# Patient Record
Sex: MALE | Race: WHITE
[De-identification: names, ages, dates, MRNs, and addresses within clinical notes are randomized per-mention and may not be internally consistent; named-entity substitution may affect disease eponyms.]

---

## 2017-09-29 ENCOUNTER — HOSPITAL ENCOUNTER (INPATIENT)
Dept: HOSPITAL 17 - NEPC | Age: 67
LOS: 3 days | Discharge: HOME | DRG: 83 | End: 2017-10-02
Attending: SURGERY | Admitting: SURGERY
Payer: COMMERCIAL

## 2017-09-29 VITALS
DIASTOLIC BLOOD PRESSURE: 91 MMHG | HEART RATE: 71 BPM | OXYGEN SATURATION: 97 % | RESPIRATION RATE: 18 BRPM | SYSTOLIC BLOOD PRESSURE: 135 MMHG

## 2017-09-29 VITALS
TEMPERATURE: 98.1 F | DIASTOLIC BLOOD PRESSURE: 96 MMHG | RESPIRATION RATE: 18 BRPM | HEART RATE: 72 BPM | OXYGEN SATURATION: 98 % | SYSTOLIC BLOOD PRESSURE: 144 MMHG

## 2017-09-29 VITALS
HEART RATE: 87 BPM | DIASTOLIC BLOOD PRESSURE: 69 MMHG | RESPIRATION RATE: 18 BRPM | SYSTOLIC BLOOD PRESSURE: 98 MMHG | OXYGEN SATURATION: 97 %

## 2017-09-29 VITALS — HEIGHT: 71 IN | WEIGHT: 205.03 LBS | BODY MASS INDEX: 28.7 KG/M2

## 2017-09-29 VITALS — OXYGEN SATURATION: 97 %

## 2017-09-29 VITALS — OXYGEN SATURATION: 99 %

## 2017-09-29 DIAGNOSIS — I95.9: ICD-10-CM

## 2017-09-29 DIAGNOSIS — S01.01XA: ICD-10-CM

## 2017-09-29 DIAGNOSIS — S32.029A: ICD-10-CM

## 2017-09-29 DIAGNOSIS — S06.2X9A: ICD-10-CM

## 2017-09-29 DIAGNOSIS — I10: ICD-10-CM
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
## 2017-09-29 DIAGNOSIS — E78.5: ICD-10-CM

## 2017-09-29 DIAGNOSIS — K21.9: ICD-10-CM

## 2017-09-29 DIAGNOSIS — R40.2413: ICD-10-CM

## 2017-09-29 DIAGNOSIS — S06.369A: Primary | ICD-10-CM

## 2017-09-29 DIAGNOSIS — S32.039A: ICD-10-CM

## 2017-09-29 LAB
ANION GAP SERPL CALC-SCNC: 6 MEQ/L (ref 5–15)
APTT BLD: 24.7 SEC (ref 24.3–30.1)
BASOPHILS # BLD AUTO: 0 TH/MM3 (ref 0–0.2)
BASOPHILS NFR BLD: 0.2 % (ref 0–2)
BUN SERPL-MCNC: 14 MG/DL (ref 7–18)
CHLORIDE SERPL-SCNC: 105 MEQ/L (ref 98–107)
EOSINOPHIL # BLD: 0.3 TH/MM3 (ref 0–0.4)
EOSINOPHIL NFR BLD: 2.6 % (ref 0–4)
ERYTHROCYTE [DISTWIDTH] IN BLOOD BY AUTOMATED COUNT: 14.6 % (ref 11.6–17.2)
ETHANOL SERPL-MCNC: (no result) MG/DL (ref 0–5)
GFR SERPLBLD BASED ON 1.73 SQ M-ARVRAT: 59 ML/MIN (ref 89–?)
HCO3 BLD-SCNC: 27.1 MEQ/L (ref 21–32)
HCT VFR BLD CALC: 43.1 % (ref 39–51)
HEMO FLAGS: (no result)
INR PPP: 0.9 RATIO
LYMPHOCYTES # BLD AUTO: 2.4 TH/MM3 (ref 1–4.8)
LYMPHOCYTES NFR BLD AUTO: 21.3 % (ref 9–44)
MAGNESIUM SERPL-MCNC: 2.1 MG/DL (ref 1.5–2.5)
MCH RBC QN AUTO: 29.6 PG (ref 27–34)
MCHC RBC AUTO-ENTMCNC: 33.5 % (ref 32–36)
MCV RBC AUTO: 88.5 FL (ref 80–100)
MONOCYTES NFR BLD: 5.1 % (ref 0–8)
NEUTROPHILS # BLD AUTO: 8 TH/MM3 (ref 1.8–7.7)
NEUTROPHILS NFR BLD AUTO: 70.8 % (ref 16–70)
PLATELET # BLD: 203 TH/MM3 (ref 150–450)
POTASSIUM SERPL-SCNC: 3.5 MEQ/L (ref 3.5–5.1)
PROTHROMBIN TIME: 10.2 SEC (ref 9.8–11.6)
RBC # BLD AUTO: 4.87 MIL/MM3 (ref 4.5–5.9)
SODIUM SERPL-SCNC: 138 MEQ/L (ref 136–145)
WBC # BLD AUTO: 11.3 TH/MM3 (ref 4–11)

## 2017-09-29 PROCEDURE — 86900 BLOOD TYPING SEROLOGIC ABO: CPT

## 2017-09-29 PROCEDURE — 93005 ELECTROCARDIOGRAM TRACING: CPT

## 2017-09-29 PROCEDURE — 85730 THROMBOPLASTIN TIME PARTIAL: CPT

## 2017-09-29 PROCEDURE — 87641 MR-STAPH DNA AMP PROBE: CPT

## 2017-09-29 PROCEDURE — 70551 MRI BRAIN STEM W/O DYE: CPT

## 2017-09-29 PROCEDURE — 70450 CT HEAD/BRAIN W/O DYE: CPT

## 2017-09-29 PROCEDURE — 85610 PROTHROMBIN TIME: CPT

## 2017-09-29 PROCEDURE — 96374 THER/PROPH/DIAG INJ IV PUSH: CPT

## 2017-09-29 PROCEDURE — 72128 CT CHEST SPINE W/O DYE: CPT

## 2017-09-29 PROCEDURE — 85025 COMPLETE CBC W/AUTO DIFF WBC: CPT

## 2017-09-29 PROCEDURE — 94150 VITAL CAPACITY TEST: CPT

## 2017-09-29 PROCEDURE — 83735 ASSAY OF MAGNESIUM: CPT

## 2017-09-29 PROCEDURE — 86901 BLOOD TYPING SEROLOGIC RH(D): CPT

## 2017-09-29 PROCEDURE — 80048 BASIC METABOLIC PNL TOTAL CA: CPT

## 2017-09-29 PROCEDURE — 0HQ0XZZ REPAIR SCALP SKIN, EXTERNAL APPROACH: ICD-10-PCS | Performed by: PHYSICIAN ASSISTANT

## 2017-09-29 PROCEDURE — 80053 COMPREHEN METABOLIC PANEL: CPT

## 2017-09-29 PROCEDURE — 74177 CT ABD & PELVIS W/CONTRAST: CPT

## 2017-09-29 PROCEDURE — 80307 DRUG TEST PRSMV CHEM ANLYZR: CPT

## 2017-09-29 PROCEDURE — 72125 CT NECK SPINE W/O DYE: CPT

## 2017-09-29 PROCEDURE — 86850 RBC ANTIBODY SCREEN: CPT

## 2017-09-29 PROCEDURE — 81001 URINALYSIS AUTO W/SCOPE: CPT

## 2017-09-29 PROCEDURE — 71260 CT THORAX DX C+: CPT

## 2017-09-29 PROCEDURE — 90714 TD VACC NO PRESV 7 YRS+ IM: CPT

## 2017-09-29 PROCEDURE — 72131 CT LUMBAR SPINE W/O DYE: CPT

## 2017-09-29 NOTE — RADRPT
EXAM DATE/TIME:  09/29/2017 21:56 

 

[facility] COMPARISON:     

No previous studies available for comparison.

 

 

INDICATIONS :     

Trauma, fall. Laceration to posterior head.

                      

 

RADIATION DOSE:     

69.15 CTDIvol (mGy) 

 

 

 

MEDICAL HISTORY :     

Non-responsive.  

 

SURGICAL HISTORY :      

Non-responsive. 

 

ENCOUNTER:      

Initial

 

ACUITY:      

1 day

 

PAIN SCALE:      

Non-responsive

 

LOCATION:        

cranial 

 

TECHNIQUE:     

Multiple contiguous axial images were obtained of the head.  Using automated exposure control and adj
ustment of the mA and/or kV according to patient size, radiation dose was kept as low as reasonably a
chievable to obtain optimal diagnostic quality images.   DICOM format image data is available electro
nically for review and comparison.  

 

FINDINGS:     

Parenchymal contusion with adjacent small subarachnoid blood seen of the left parietal and frontal ve
rtices, series 2 image 31. There is a suspected faint parenchymal contusion in the white matter of th
e right frontal lobe measuring about 13 mm in size. Small amount of right frontal extra-axial blood i
s seen on series 2 image 23, probably subarachnoid. There is an 8 mm acute parenchymal hemorrhage of 
the right side of the cerebellum, series 2 image 10. In general, the cerebellum appears somewhat billy
atous.

 

     No mass effect or midline shift. No mass lesion demonstrated. No evidence of an acute ischemic e
vent.

 

CONCLUSION:     

1. Multifocal small cerebral and cerebellar parenchymal hemorrhage.

2. Scattered small supratentorial subarachnoid blood.

3. No mass, mass effect, midline shift or acute infarct..

 

 

 

 Jose Enrique Chiu MD on September 29, 2017 at 22:11           

Board Certified Radiologist.

 This report was verified electronically.

## 2017-09-29 NOTE — RADRPT
EXAM DATE/TIME:  09/29/2017 21:55 

 

[facility] COMPARISON:     

No previous studies available for comparison.

 

 

INDICATIONS :     

Trauma, fall.

                      

 

RADIATION DOSE:     

49.4 CTDIvol (mGy) 

 

 

 

MEDICAL HISTORY :     

Non-responsive.  

 

SURGICAL HISTORY :      

Non-responsive. 

 

ENCOUNTER:      

Initial

 

ACUITY:      

1 day

 

PAIN SCALE:      

Non-responsive

 

LOCATION:        

neck 

 

TECHNIQUE:     

Volumetric scanning of the cervical spine was performed. Multiplanar reconstructions in the sagittal,
 coronal and oblique axial planes were performed.   Using automated exposure control and adjustment o
f the mA and/or kV according to patient size, radiation dose was kept as low as reasonably achievable
 to obtain optimal diagnostic quality images.   DICOM format image data is available electronically f
or review and comparison.  

 

FINDINGS:     

 

VERTEBRAE:     

Normal vertebral body height.

 

ALIGNMENT:     

No evidence of subluxation.

 

C2-C3:  

The bony spinal canal is normal in size.  No evidence of disc bulge or herniation.  The neural forami
na are bilaterally patent.

 

C3-C4:  

The bony spinal canal is normal in size.  No evidence of disc bulge or herniation.  The neural forami
na are bilaterally patent.

 

C4-C5:  

The bony spinal canal is normal in size.  No evidence of disc bulge or herniation.  The neural forami
na are bilaterally patent.

 

C5-C6: 

Moderate disc space narrowing with uncovertebral and facet osteoarthritis.

 

C6-C7: 

Severe disc space narrowing with uncovertebral and facet osteoarthritis and mild right, moderate left
 foraminal stenosis.

 

C7-T1:  

The bony spinal canal is normal in size.  No evidence of disc bulge or herniation.  The neural forami
na are bilaterally patent.

 

CONCLUSION:     

Intact cervical spine. Degenerative changes at C5/C6 and C6/C7.

 

 

 

 Jose Enrique Chiu MD on September 29, 2017 at 22:16           

Board Certified Radiologist.

 This report was verified electronically.

## 2017-09-29 NOTE — PD
HPI


Chief Complaint:  Fall


Time Seen by Provider:  21:45


Travel History


International Travel<30 days:  No


Contact w/Intl Traveler<30days:  No


Traveled to known affect area:  No





History of Present Illness


HPI


67-year-old male that presents to the ED via ambulance for evaluation of head 

injury.  Patient apparently had a head injury about less than half an hour ago.

  Patient apparently was seen at chair with wheels and tried to cath a lizard 

on the chair when he fell and hit the base on the back of his head.  He didn't 

lose consciousness but to the family about 10 minutes to get any information 

from the patient.  Ambulance was called and brought him here.  Per ambulance she

's been repetitive in his questioning and he does not appear to be answering 

questions appropriately.  He does not appear to know what's going on.  He does 

not take blood thinners.  He does have a significant cut to the back of the 

head per ambulance.  Per ambulance he continues to bleed.  No chest pain or 

shortness of breath.  No other complaints but patient again is a poor historian 

secondary to his medical condition at this time.  Patient appears to be moaning 

and complains of pain but cannot really give me a number.  He appears to be 

moving all extremities in no obvious distress.  Patient was brought in a 

cervical collar and backboard noted.





[facility]


Social History


Alcohol Use:  Yes


Tobacco Use:  No


Substance Use:  No





Allergies-Medications


(Allergen,Severity, Reaction):  


Coded Allergies:  


     No Known Allergies (Unverified , 9/29/17)





Review of Systems


Except as stated in HPI:  all other systems reviewed are Neg





Physical Exam


Narrative


GENERAL: 


SKIN: Warm and dry.  Patient appears to have laceration to the back of the head 

by hard to see secondary to cervical collar on initial evaluation.


HEAD: Atraumatic. Normocephalic. 


EYES: Pupils equal and round 4 mm reactive to light and accommodation. No 

scleral icterus. No injection or drainage. 


ENT: No nasal bleeding or discharge.  Mucous membranes pink and moist.  Tongue 

is midline.  No uvula deviation.


NECK: Trachea midline. No JVD. 


CARDIOVASCULAR: Regular rate and rhythm.  No murmurs, S3, S4.


RESPIRATORY: No accessory muscle use. Clear to auscultation. Breath sounds 

equal bilaterally. 


GASTROINTESTINAL: Abdomen soft, non-tender, nondistended. Hepatic and splenic 

margins not palpable. 


MUSCULOSKELETAL: Extremities without clubbing, cyanosis, or edema. No obvious 

deformities.  Full range of motion of the upper and lower extremities 

bilaterally.  Pupils pulses bilaterally.  No obvious lumbar or thoracic spine 

tenderness to palpation.  Patient had cervical collar and backboard noted.


NEUROLOGICAL: Awake and alert. No obvious cranial nerve deficits.  Motor 

grossly within normal limits. Five out of 5 muscle strength in the arms and 

legs.  Normal speech.


PSYCHIATRIC: Appropriate mood and affect; insight and judgment normal.





Data


Data


Last Documented VS





Vital Signs








  Date Time  Temp Pulse Resp B/P (MAP) Pulse Ox O2 Delivery O2 Flow Rate FiO2


 


9/29/17 22:27  71 18 135/91 (106) 97 Room Air  


 


9/29/17 21:48 98.1       








Orders





 Orders


Electrocardiogram (9/29/17 21:45)


Complete Blood Count With Diff (9/29/17 21:45)


Basic Metabolic Panel (Bmp) (9/29/17 21:45)


Prothrombin Time / Inr (Pt) (9/29/17 21:45)


Act Partial Throm Time (Ptt) (9/29/17 21:45)


Urinalysis - C+S If Indicated (9/29/17 21:45)


Magnesium (Mg) (9/29/17 21:45)


Chest, Single Ap (9/29/17 21:45)


Ct Brain W/O Iv Contrast(Rout) (9/29/17 21:45)


Iv Access Insert/Monitor (9/29/17 21:45)


Ecg Monitoring (9/29/17 21:45)


Oximetry (9/29/17 21:45)


Type And Screen (9/29/17 21:45)


Ct Cerv Spine W/O Contrast (9/29/17 )


Spine, Lumbar Comp W/Obliq (9/29/17 )


Pelvis, Ap Only (Routine) (9/29/17 )


Wound Care (9/29/17 21:45)


Tetanus/Diphtheria Tox Adult (Tetanus/Di (9/29/17 21:45)


Lidocai-Epi 1%-1:100,000 Inj (Xylocaine- (9/29/17 21:45)


Ondansetron Inj (Zofran Inj) (9/29/17 22:00)


Ondansetron Inj (Zofran Inj) (9/29/17 21:58)


Alcohol (Ethanol) (9/29/17 22:20)


Levetiracetam 1000 Mg Inj (Keppra 1000 M (9/29/17 23:00)


Admit Order (Ed Use Only) (9/29/17 22:54)


Consult Neurosurgery (9/29/17 )





Labs





Laboratory Tests








Test


  9/29/17


22:20


 


White Blood Count 11.3 TH/MM3 


 


Red Blood Count 4.87 MIL/MM3 


 


Hemoglobin 14.4 GM/DL 


 


Hematocrit 43.1 % 


 


Mean Corpuscular Volume 88.5 FL 


 


Mean Corpuscular Hemoglobin 29.6 PG 


 


Mean Corpuscular Hemoglobin


Concent 33.5 % 


 


 


Red Cell Distribution Width 14.6 % 


 


Platelet Count 203 TH/MM3 


 


Mean Platelet Volume 8.2 FL 


 


Neutrophils (%) (Auto) 70.8 % 


 


Lymphocytes (%) (Auto) 21.3 % 


 


Monocytes (%) (Auto) 5.1 % 


 


Eosinophils (%) (Auto) 2.6 % 


 


Basophils (%) (Auto) 0.2 % 


 


Neutrophils # (Auto) 8.0 TH/MM3 


 


Lymphocytes # (Auto) 2.4 TH/MM3 


 


Monocytes # (Auto) 0.6 TH/MM3 


 


Eosinophils # (Auto) 0.3 TH/MM3 


 


Basophils # (Auto) 0.0 TH/MM3 


 


CBC Comment DIFF FINAL 


 


Differential Comment  











MDM


Medical Decision Making


Medical Screen Exam Complete:  Yes


Emergency Medical Condition:  Yes


Medical Record Reviewed:  Yes


Interpretation(s)


CBC Diagram


9/29/17 22:20








CT shows subarachnoid hemmorrhage


Ct cervical spine shows no acute injury


Differential Diagnosis


Traumatic head injury versus ICH versus concussion versus head bleed versus 

laceration versus coagulopathy versus cervical fracture


Narrative Course


67-year-old male that presents to the ED for evaluation of head injury.  

Patient was evaluated and was a very poor historian.  Patient does appear to 

have suffered a significant head injury.  Patient did vomit on the way to CT 

scan.  CT scan was ordered.  Labs were ordered.  Case was discussed in my 

attending Dr. Burgess who evaluated the patient with me.  Labs and imaging 

showed head bleed.  Case was discussed with my attending for agrees with plan.  

Cervical spine was negative, cervical collar removed. CT head shows head bleed. 

My attending Dr burgess recommends neurosurgery and trauma surgeon eval. Dr Overton agrees to admit to trauma/intensivist, keppra 1000 mg BID, CT 

repeat in 12 hrs. Consult to him. Case consulted with Dr. Lopez for trauma 

surgeon who agrees to admission.  He will come here and see the patient.  

Patient will be admitted to the intensive care.  He will come and evaluate the 

patient.





Procedures


**Procedure Narrative**


LACERATION


LOCATION: left occipital head


LENGTH: 5 cm


NUMBER OF STITCHES/STAPLES: 10 staples





REPAIR: The area of the laceration was prepped with Betadine and sterilely 

draped.  The laceration was infiltrated with 1% Xylocaine.  The wound was 

copiously irrigated and explored without evidence of foreign body, tendon 

injury or neurovascular injury.  The wound was closed using sterile stapler. 

This was a 1 layer repair. A sterile dressing was applied. The patient was 

advised to keep the dressing clean and dry. Patient tolerated the procedure 

well.





Diagnosis





 Primary Impression:  


 Subarachnoid hemorrhage following injury


 Qualified Codes:  S06.6X1A - Traumatic subarachnoid hemorrhage with loss of 

consciousness of 30 minutes or less, initial encounter


 Additional Impression:  


 Head trauma


 Qualified Codes:  S09.90XA - Unspecified injury of head, initial encounter





Admitting Information


Admitting Physician Requests:  Admit











Ryan Morse Sep 29, 2017 22:04

## 2017-09-30 VITALS
RESPIRATION RATE: 11 BRPM | TEMPERATURE: 98.6 F | DIASTOLIC BLOOD PRESSURE: 58 MMHG | HEART RATE: 72 BPM | OXYGEN SATURATION: 98 % | SYSTOLIC BLOOD PRESSURE: 99 MMHG

## 2017-09-30 VITALS
OXYGEN SATURATION: 94 % | DIASTOLIC BLOOD PRESSURE: 73 MMHG | RESPIRATION RATE: 19 BRPM | TEMPERATURE: 98.1 F | HEART RATE: 88 BPM | SYSTOLIC BLOOD PRESSURE: 125 MMHG

## 2017-09-30 VITALS — HEART RATE: 79 BPM

## 2017-09-30 VITALS
SYSTOLIC BLOOD PRESSURE: 95 MMHG | OXYGEN SATURATION: 100 % | RESPIRATION RATE: 18 BRPM | HEART RATE: 78 BPM | DIASTOLIC BLOOD PRESSURE: 64 MMHG

## 2017-09-30 VITALS
DIASTOLIC BLOOD PRESSURE: 59 MMHG | RESPIRATION RATE: 15 BRPM | HEART RATE: 72 BPM | TEMPERATURE: 99.4 F | SYSTOLIC BLOOD PRESSURE: 116 MMHG | OXYGEN SATURATION: 97 %

## 2017-09-30 VITALS
TEMPERATURE: 99.3 F | OXYGEN SATURATION: 95 % | HEART RATE: 82 BPM | SYSTOLIC BLOOD PRESSURE: 136 MMHG | RESPIRATION RATE: 17 BRPM | DIASTOLIC BLOOD PRESSURE: 71 MMHG

## 2017-09-30 VITALS
SYSTOLIC BLOOD PRESSURE: 81 MMHG | RESPIRATION RATE: 13 BRPM | TEMPERATURE: 98.3 F | OXYGEN SATURATION: 97 % | HEART RATE: 76 BPM | DIASTOLIC BLOOD PRESSURE: 52 MMHG

## 2017-09-30 VITALS
RESPIRATION RATE: 12 BRPM | TEMPERATURE: 98.1 F | SYSTOLIC BLOOD PRESSURE: 118 MMHG | OXYGEN SATURATION: 98 % | DIASTOLIC BLOOD PRESSURE: 63 MMHG | HEART RATE: 68 BPM

## 2017-09-30 VITALS
HEART RATE: 79 BPM | RESPIRATION RATE: 18 BRPM | OXYGEN SATURATION: 99 % | SYSTOLIC BLOOD PRESSURE: 124 MMHG | DIASTOLIC BLOOD PRESSURE: 71 MMHG

## 2017-09-30 VITALS — HEART RATE: 68 BPM

## 2017-09-30 VITALS — HEART RATE: 72 BPM

## 2017-09-30 VITALS — HEART RATE: 88 BPM

## 2017-09-30 VITALS — OXYGEN SATURATION: 98 %

## 2017-09-30 VITALS — OXYGEN SATURATION: 99 %

## 2017-09-30 VITALS — HEART RATE: 85 BPM

## 2017-09-30 VITALS — HEART RATE: 90 BPM

## 2017-09-30 LAB
ANION GAP SERPL CALC-SCNC: 7 MEQ/L (ref 5–15)
BASOPHILS # BLD AUTO: 0 TH/MM3 (ref 0–0.2)
BASOPHILS # BLD AUTO: 0 TH/MM3 (ref 0–0.2)
BASOPHILS NFR BLD: 0.2 % (ref 0–2)
BASOPHILS NFR BLD: 0.3 % (ref 0–2)
BUN SERPL-MCNC: 13 MG/DL (ref 7–18)
CHLORIDE SERPL-SCNC: 109 MEQ/L (ref 98–107)
COLOR UR: YELLOW
COMMENT (UR): (no result)
CULTURE IF INDICATED: (no result)
EOSINOPHIL # BLD: 0 TH/MM3 (ref 0–0.4)
EOSINOPHIL # BLD: 0 TH/MM3 (ref 0–0.4)
EOSINOPHIL NFR BLD: 0.1 % (ref 0–4)
EOSINOPHIL NFR BLD: 0.3 % (ref 0–4)
ERYTHROCYTE [DISTWIDTH] IN BLOOD BY AUTOMATED COUNT: 14.1 % (ref 11.6–17.2)
ERYTHROCYTE [DISTWIDTH] IN BLOOD BY AUTOMATED COUNT: 14.8 % (ref 11.6–17.2)
GFR SERPLBLD BASED ON 1.73 SQ M-ARVRAT: 82 ML/MIN (ref 89–?)
GLUCOSE UR STRIP-MCNC: (no result) MG/DL
HCO3 BLD-SCNC: 24.5 MEQ/L (ref 21–32)
HCT VFR BLD CALC: 34.2 % (ref 39–51)
HCT VFR BLD CALC: 37.6 % (ref 39–51)
HEMO FLAGS: (no result)
HEMO FLAGS: (no result)
HGB UR QL STRIP: (no result)
HYALINE CASTS #/AREA URNS LPF: 3 /LPF
INR PPP: 1 RATIO
KETONES UR STRIP-MCNC: 10 MG/DL
LYMPHOCYTES # BLD AUTO: 1.2 TH/MM3 (ref 1–4.8)
LYMPHOCYTES # BLD AUTO: 1.8 TH/MM3 (ref 1–4.8)
LYMPHOCYTES NFR BLD AUTO: 9 % (ref 9–44)
LYMPHOCYTES NFR BLD AUTO: 9.6 % (ref 9–44)
MCH RBC QN AUTO: 29.1 PG (ref 27–34)
MCH RBC QN AUTO: 29.5 PG (ref 27–34)
MCHC RBC AUTO-ENTMCNC: 32.9 % (ref 32–36)
MCHC RBC AUTO-ENTMCNC: 33 % (ref 32–36)
MCV RBC AUTO: 88.3 FL (ref 80–100)
MCV RBC AUTO: 89.6 FL (ref 80–100)
MONOCYTES NFR BLD: 5.3 % (ref 0–8)
MONOCYTES NFR BLD: 6.3 % (ref 0–8)
MUCOUS THREADS #/AREA URNS LPF: (no result) /LPF
NEUTROPHILS # BLD AUTO: 11.7 TH/MM3 (ref 1.8–7.7)
NEUTROPHILS # BLD AUTO: 15.5 TH/MM3 (ref 1.8–7.7)
NEUTROPHILS NFR BLD AUTO: 83.6 % (ref 16–70)
NEUTROPHILS NFR BLD AUTO: 85.3 % (ref 16–70)
NITRITE UR QL STRIP: (no result)
PLATELET # BLD: 169 TH/MM3 (ref 150–450)
PLATELET # BLD: 185 TH/MM3 (ref 150–450)
POTASSIUM SERPL-SCNC: 4.2 MEQ/L (ref 3.5–5.1)
PROTHROMBIN TIME: 11.2 SEC (ref 9.8–11.6)
RBC # BLD AUTO: 3.87 MIL/MM3 (ref 4.5–5.9)
RBC # BLD AUTO: 4.2 MIL/MM3 (ref 4.5–5.9)
SODIUM SERPL-SCNC: 140 MEQ/L (ref 136–145)
SP GR UR STRIP: (no result) (ref 1–1.03)
SQUAMOUS #/AREA URNS HPF: <1 /HPF (ref 0–5)
WBC # BLD AUTO: 13.7 TH/MM3 (ref 4–11)
WBC # BLD AUTO: 18.6 TH/MM3 (ref 4–11)

## 2017-09-30 RX ADMIN — STANDARDIZED SENNA CONCENTRATE AND DOCUSATE SODIUM SCH TAB: 8.6; 5 TABLET, FILM COATED ORAL at 20:17

## 2017-09-30 RX ADMIN — WATER SCH ML: 1 IRRIGANT IRRIGATION at 09:00

## 2017-09-30 RX ADMIN — LEVETIRACETAM SCH MLS/HR: 100 INJECTION, SOLUTION, CONCENTRATE INTRAVENOUS at 08:16

## 2017-09-30 RX ADMIN — FAMOTIDINE SCH MG: 20 TABLET, FILM COATED ORAL at 08:14

## 2017-09-30 RX ADMIN — LEVETIRACETAM SCH MLS/HR: 100 INJECTION, SOLUTION, CONCENTRATE INTRAVENOUS at 01:24

## 2017-09-30 RX ADMIN — MAGNESIUM HYDROXIDE SCH ML: 400 SUSPENSION ORAL at 20:18

## 2017-09-30 RX ADMIN — NICOTINE SCH PATCH: 21 PATCH, EXTENDED RELEASE TOPICAL at 20:13

## 2017-09-30 RX ADMIN — MAGNESIUM HYDROXIDE SCH ML: 400 SUSPENSION ORAL at 09:00

## 2017-09-30 RX ADMIN — Medication SCH ML: at 20:14

## 2017-09-30 RX ADMIN — ACETAMINOPHEN PRN MG: 325 TABLET ORAL at 20:14

## 2017-09-30 RX ADMIN — STANDARDIZED SENNA CONCENTRATE AND DOCUSATE SODIUM SCH TAB: 8.6; 5 TABLET, FILM COATED ORAL at 08:14

## 2017-09-30 RX ADMIN — CHLORHEXIDINE GLUCONATE SCH PACK: 500 CLOTH TOPICAL at 03:15

## 2017-09-30 RX ADMIN — ACETAMINOPHEN PRN MG: 325 TABLET ORAL at 12:47

## 2017-09-30 RX ADMIN — LEVETIRACETAM SCH MLS/HR: 100 INJECTION, SOLUTION, CONCENTRATE INTRAVENOUS at 20:13

## 2017-09-30 RX ADMIN — ACETAMINOPHEN PRN MLS/HR: 10 INJECTION, SOLUTION INTRAVENOUS at 01:15

## 2017-09-30 RX ADMIN — FAMOTIDINE SCH MG: 20 TABLET, FILM COATED ORAL at 20:14

## 2017-09-30 RX ADMIN — Medication SCH ML: at 08:20

## 2017-09-30 NOTE — RADRPT
EXAM DATE/TIME:  09/30/2017 00:37 

 

[facility] COMPARISON:     

No previous studies available for comparison.

 

 

INDICATIONS :     

Trauma, fall.

                      

 

IV CONTRAST:     

100 cc Omnipaque 350 (iohexol) IV ; Cumulative dose for multiple exams.

 

 

RADIATION DOSE:     

5.91 CTDIvol (mGy) ; Combined studies - Thorax/Abdomen/Pelvis

 

 

MEDICAL HISTORY :     

None  

 

SURGICAL HISTORY :      

None. 

 

ENCOUNTER:      

Initial

 

ACUITY:      

1 day

 

PAIN SCALE:      

Non-responsive

 

LOCATION:        

chest 

 

TECHNIQUE:      

Volumetric scanning of the chest was performed.  Using automated exposure control and adjustment of t
he mA and/or kV according to patient size, radiation dose was kept as low as reasonably achievable to
 obtain optimal diagnostic quality images.   DICOM format image data is available electronically for 
review and comparison.  

 

Follow-up recommendations for detected pulmonary nodules are based at a minimum on nodule size and pa
tient risk factors according to Fleischner Society Guidelines.

 

FINDINGS:     

Dependent atelectasis is present in the lungs. No focal lung consolidation. No adenopathy. Calcified 
mediastinal lymph nodes present.

 

Remote bilateral healed rib fractures noted. No acute fractures identified. No pleural or pericardial
 effusion.

 

There is a moderate-sized hiatal hernia. No acute findings in the upper abdomen. Remote granulomatous
 disease in the spleen. Surgical clips in the right upper quadrant.

 

CONCLUSION:     

1. Negative for acute traumatic injury in the thorax. Dependent atelectasis in the lungs. Remote gran
ulomatous disease with calcified mediastinal lymph nodes.

2. Moderate-sized hiatal hernia. Remote healed bilateral rib fractures.

 

 

 

 Jeremie Cummings MD on September 30, 2017 at 1:02           

Board Certified Radiologist.

 This report was verified electronically.

## 2017-09-30 NOTE — HHI.HP
History of Present Illness


Primary Care Physician


Unknown


Admission Diagnosis





acute traumatic subarachnoid hemorrhage, head lac


Diagnoses:  


History of Present Illness


67 y.o male fell from a chair and hit the back of his head,open back of his 

head with bleeding-already seen and worked up by the ED,has SAH,gcs 15,neuro 

intact,scalp wound sutured by ED-episode of hypotension-responded well to 1L 

bolus,stat CT chest,AP ordered-at time of my exam patient c/o headache





Review of Systems


Constitutional:  COMPLAINS OF: Diaphoretic episodes, Fatigue, Fever, Weight gain

, Weight loss, Chills, Dizziness, Change in appetite, Night Sweats


Endocrine:  DENIES: Heat/cold intolerance, Polydipsia, Polyuria, Polyphagia


Eyes:  DENIES: Blurred vision, Diplopia, Eye inflammation, Eye pain, Vision loss

, Photosensitivity, Double Vision


Ears, nose, mouth, throat:  DENIES: Tinnitus, Hearing loss, Vertigo, Nasal 

discharge, Oral lesions, Throat pain, Hoarseness, Ear Pain, Running Nose, 

Epistaxis, Sinus Pain, Toothache, Odynophagia


Respiratory:  DENIES: Apneas, Cough, Snoring, Wheezing, Hemoptysis, Sputum 

production, Shortness of breath


Cardiovascular:  DENIES: Chest pain, Palpitations, Syncope, Dyspnea on Exertion

, PND, Lower Extremity Edema, Orthopnea, Claudication


Gastrointestinal:  DENIES: Abdominal pain, Black stools, Bloody stools, 

Constipation, Diarrhea, Nausea, Vomiting, Difficulty Swallowing, Anorexia


Genitourinary:  DENIES: Sexual dysfunction, Urinary frequency, Urinary 

incontinence, Urgency, Hematuria, Dysuria, Nocturia, Penile Discharge, 

Testicular Pain, Testicular Swelling


Musculoskeletal:  DENIES: Joint pain, Muscle aches, Stiffness, Joint Swelling, 

Back pain, Neck pain


Integumentary:  DENIES: Abnormal pigmentation, Nail changes, Pruritus, Rash


Hematologic/lymphatic:  DENIES: Bruising, Lymphadenopathy


Neurologic:  DENIES: Abnormal gait, Headache, Localized weakness, Paresthesias, 

Seizures, Speech Problems, Tremor, Poor Balance


Psychiatric:  DENIES: Anxiety, Confusion, Mood changes, Depression, 

Hallucinations, Agitation, Suicidal Ideation, Homicidal Ideation, Delusions





Past Family Social History


Allergies:  


Coded Allergies:  


     No Known Allergies (Unverified , 17)


Past Medical History


none


Past Surgical History


multiple fx sx


Reported Medications


none


Active Ordered Medications


none


Family History


none


Social History


no etoh





Physical Exam


Vital Signs





Vital Signs








  Date Time  Temp Pulse Resp B/P (MAP) Pulse Ox O2 Delivery O2 Flow Rate FiO2


 


17 00:31  79 18 124/71 (88) 99 Room Air  


 


17 00:19  78 18 95/64 (74) 100 Room Air  


 


17 23:29  87 18 98/69 (79) 97 Nasal Cannula 2.00 


 


17 23:23     99 Nasal Cannula 2.00 


 


17 22:27  71 18 135/91 (106) 97 Room Air  


 


17 21:52     97 Room Air  


 


17 21:52  74 18  97   


 


17 21:48 98.1 72 18 144/96 (112) 98   








Physical Exam


GENERAL: This is a well-nourished, well-developed patient, in mild apparent 

distress.


SKIN: No rashes, ecchymoses or lesions. Cool and dry.


HEAD: sutured occipital wound


EYES: Pupils equal round and reactive. Extraocular motions intact.


ENT: Nose without bleeding . Airway patent.


NECK: Trachea midline. No JVD or lymphadenopathy. Supple, nontender


CARDIOVASCULAR: Regular rate and rhythm without murmurs, gallops, or rubs. 


RESPIRATORY: Clear to auscultation. Breath sounds equal bilaterally. No wheezes

, rales, or rhonchi.  


GASTROINTESTINAL: Abdomen soft, non-tender, nondistended, . No guarding.


MUSCULOSKELETAL: Extremities without edema. No joint tenderness, effusion, or 

edema noted. 


NEUROLOGICAL: Awake and alert. Cranial nerves II through XII intact.  Motor and 

sensory grossly within normal limits. Five out of 5 muscle strength in all 

muscle groups.  Normal speech.


Laboratory





Laboratory Tests








Test


  17


22:20


 


White Blood Count 11.3 


 


Red Blood Count 4.87 


 


Hemoglobin 14.4 


 


Hematocrit 43.1 


 


Mean Corpuscular Volume 88.5 


 


Mean Corpuscular Hemoglobin 29.6 


 


Mean Corpuscular Hemoglobin


Concent 33.5 


 


 


Red Cell Distribution Width 14.6 


 


Platelet Count 203 


 


Mean Platelet Volume 8.2 


 


Neutrophils (%) (Auto) 70.8 


 


Lymphocytes (%) (Auto) 21.3 


 


Monocytes (%) (Auto) 5.1 


 


Eosinophils (%) (Auto) 2.6 


 


Basophils (%) (Auto) 0.2 


 


Neutrophils # (Auto) 8.0 


 


Lymphocytes # (Auto) 2.4 


 


Monocytes # (Auto) 0.6 


 


Eosinophils # (Auto) 0.3 


 


Basophils # (Auto) 0.0 


 


CBC Comment DIFF FINAL 


 


Differential Comment  


 


Prothrombin Time 10.2 


 


Prothromb Time International


Ratio 0.9 


 


 


Activated Partial


Thromboplast Time 24.7 


 


 


Blood Urea Nitrogen 14 


 


Creatinine 1.23 


 


Random Glucose 138 


 


Calcium Level 8.9 


 


Magnesium Level 2.1 


 


Sodium Level 138 


 


Potassium Level 3.5 


 


Chloride Level 105 


 


Carbon Dioxide Level 27.1 


 


Anion Gap 6 


 


Estimat Glomerular Filtration


Rate 59 


 


 


Ethyl Alcohol Level LESS THAN 3 








Result Diagram:  


17








Caprini VTE Risk Assessment


Caprini VTE Risk Assessment:  Mod/High Risk (score >= 2)


VTE Pharm Contraindication:  Hemorrhage


Caprini Risk Assessment Model











 Point Value = 1          Point Value = 2  Point Value = 3  Point Value = 5


 


Age 41-60


Minor surgery


BMI > 25 kg/m2


Swollen legs


Varicose veins


Pregnancy or postpartum


History of unexplained or recurrent


   spontaneous 


Oral contraceptives or hormone


   replacement


Sepsis (< 1 month)


Serious lung disease, including


   pneumonia (< 1 month)


Abnormal pulmonary function


Acute myocardial infarction


Congestive heart failure (< 1 month)


History of inflammatory bowel disease


Medical patient at bed rest Age 61-74


Arthroscopic surgery


Major open surgery (> 45 min)


Laparoscopic surgery (> 45 min)


Malignancy


Confined to bed (> 72 hours)


Immobilizing plaster cast


Central venous access Age >= 75


History of VTE


Family history of VTE


Factor V Leiden


Prothrombin 54443Y


Lupus anticoagulant


Anticardiolipin antibodies


Elevated serum homocysteine


Heparin-induced thrombocytopenia


Other congenital or acquired


   thrombophilia Stroke (< 1 month)


Elective arthroplasty


Hip, pelvis, or leg fracture


Acute spinal cord injury (< 1 month)








Prophylaxis Regimen











   Total Risk


Factor Score Risk Level Prophylaxis Regimen


 


0-1      Low Early ambulation


 


2 Moderate Order ONE of the following:


*Sequential Compression Device (SCD)


*Heparin 5000 units SQ BID


 


3-4 Higher Order ONE of the following medications:


*Heparin 5000 units SQ TID


*Enoxaparin/Lovenox 40 mg SQ daily (WT < 150 kg, CrCl > 30 mL/min)


*Enoxaparin/Lovenox 30 mg SQ daily (WT < 150 kg, CrCl > 10-29 mL/min)


*Enoxaparin/Lovenox 30 mg SQ BID (WT < 150 kg, CrCl > 30 mL/min)


AND/OR


*Sequential Compression Device (SCD)


 


5 or more Highest Order ONE of the following medications:


*Heparin 5000 units SQ TID (Preferred with Epidurals)


*Enoxaparin/Lovenox 40 mg SQ daily (WT < 150 kg, CrCl > 30 mL/min)


*Enoxaparin/Lovenox 30 mg SQ daily (WT < 150 kg, CrCl > 10-29 mL/min)


*Enoxaparin/Lovenox 30 mg SQ BID (WT < 150 kg, CrCl > 30 mL/min)


AND


*Sequential Compression Device (SCD)











Assessment and Plan


Assessment and Plan


SAH


Open scalp wound


L2-3 TP fx


no traumatic injury abdomen/chest





admit to [facility]


neuro checks


repeat CT in am


[facility]


pain control











Ananya Lopez MD Sep 30, 2017 00:44

## 2017-09-30 NOTE — EKG
Date Performed: 09/29/2017       Time Performed: 22:15:56

 

PTAGE:      67 years

 

EKG:      Sinus rhythm 

 

 NORMAL ECG 

 

NO PREVIOUS TRACING            

 

DOCTOR:   Maik Wang  Interpretating Date/Time  09/30/2017 08:27:45

## 2017-09-30 NOTE — RADRPT
EXAM DATE/TIME:  09/30/2017 00:37 

 

[facility] COMPARISON:     

No previous studies available for comparison.

 

 

INDICATIONS :     

Trauma, fall.

                      

 

IV CONTRAST:     

100 cc Omnipaque 350 (iohexol) IV ; Cumulative dose for multiple exams.

 

 

ORAL CONTRAST:      

No oral contrast ingested.

                      

 

RADIATION DOSE:     

5.91 CTDIvol (mGy) ; Combined studies - Thorax/Abdomen/Pelvis

 

 

MEDICAL HISTORY :     

None  

 

SURGICAL HISTORY :      

None. 

 

ENCOUNTER:      

Initial

 

ACUITY:      

1 day

 

PAIN SCALE:      

Non-responsive

 

LOCATION:         

abdomen

 

TECHNIQUE:     

Volumetric scanning of the abdomen and pelvis was performed.  Using automated exposure control and ad
justment of the mA and/or kV according to patient size, radiation dose was kept as low as reasonably 
achievable to obtain optimal diagnostic quality images.  DICOM format image data is available electro
nically for review and comparison.  

 

FINDINGS:     

Mild fatty liver. Calcified granulomata in the liver and spleen. Previous cholecystectomy. Adrenals, 
kidneys and pancreas demonstrate no acute findings.

 

No free fluid. No bowel obstruction. No adenopathy. No free air.

 

Examination of bone windows reveals a mildly displaced left-sided transverse process fracture at L2, 
L3.

 

CONCLUSION:     

1. Left-sided transverse process fractures at L2 and L3.

2. Negative for acute traumatic injury within the abdomen and pelvis.

 

 

 

 Jeremie Cummings MD on September 30, 2017 at 1:07           

Board Certified Radiologist.

 This report was verified electronically.

## 2017-09-30 NOTE — RADRPT
EXAM DATE/TIME:  09/30/2017 00:37 

 

[facility] COMPARISON:     

No previous studies available for comparison.

 

 

INDICATIONS :     

Trauma, fall.

                      

 

RADIATION DOSE:      

CTDIvol (mGy) ; Reconstructed from previous dataset, no dose

 

 

 

MEDICAL HISTORY :     

None  

 

SURGICAL HISTORY :      

None. 

 

ENCOUNTER:      

Initial

 

ACUITY:      

1 day

 

PAIN SCALE:      

Non-responsive

 

LOCATION:        

Paraspinal 

 

TECHNIQUE:     

Volumetric scanning of the thoracic spine was performed.  Multiplanar reconstructions in the sagittal
, coronal and oblique axial planes were performed.  Using automated exposure control and adjustment o
f the mA and/or kV according to patient size, radiation dose was kept as low as reasonably achievable
 to obtain optimal diagnostic quality images.   DICOM format image data is available electronically f
or review and comparison.  

 

FINDINGS:     

The vertebral bodies of the thoracic spine are in normal alignment without evidence of subluxation.  


Vertebral body height is maintained.  No fractures are seen.

 

T1-T2:   

Normal.

 

T2-T3:   

The thecal sac has a normal diameter.  No evidence of disc bulge or protrusion.

 

T3-T4:   

The thecal sac has a normal diameter.  No evidence of disc bulge or protrusion.

 

T4-T5:   

The thecal sac has a normal diameter.  No evidence of disc bulge or protrusion.

 

T5-T6:   

The thecal sac has a normal diameter.  No evidence of disc bulge or protrusion.

 

T6-T7:   

The thecal sac has a normal diameter.  No evidence of disc bulge or protrusion.

 

T7-T8:   

The thecal sac has a normal diameter.  No evidence of disc bulge or protrusion.

 

T8-T9:   

The thecal sac has a normal diameter.  No evidence of disc bulge or protrusion.

 

T9-T10:  

The thecal sac has a normal diameter.  No evidence of disc bulge or protrusion.

 

T10-T11:  

The thecal sac has a normal diameter.  No evidence of disc bulge or protrusion.

 

T11-T12:  

The thecal sac has a normal diameter.  No evidence of disc bulge or protrusion.

 

T12-L1:  

The thecal sac has a normal diameter.  No evidence of disc bulge or protrusion.

 

CONCLUSION:     

1. Negative for acute traumatic injury in the thoracic spine.

 

 

 

 Jeremie Cummings MD on September 30, 2017 at 1:27           

Board Certified Radiologist.

 This report was verified electronically.

## 2017-09-30 NOTE — PD
HPI


Chief Complaint:  Fall


Time Seen by Provider:  21:45


Travel History


International Travel<30 days:  No


Contact w/Intl Traveler<30days:  No


Traveled to known affect area:  No





History of Present Illness


HPI


This is a 67-year-old male who presents to the emergency department having been 

sitting on a rolling chair when he was trying to get a lizard out of his room 

and he fell backwards hitting his head on a moderate glass vase.  Patient did 

have loss of consciousness for about 10 minutes and was very confused following 

the incident.  He had a lot of blood coming from the back of his head.  Patient 

doesn't provide much history but is able to say his name.





PFSH


Past Medical History


Asthma:  Yes


Cancer:  No


Cardiovascular Problems:  Yes


Endocrine:  No


Genitourinary:  No


Hiatal Hernia:  Yes


Immune Disorder:  No


Musculoskeletal:  No


Neurologic:  No


Psychiatric:  No


Reproductive:  No





Past Surgical History


Abdominal Surgery:  No


Cardiac Surgery:  No


Ear Surgery:  No


Endocrine Surgery:  No


Eye Surgery:  No


Genitourinary Surgery:  No


Gynecologic Surgery:  No


Oral Surgery:  No


Thoracic Surgery:  No





Social History


Alcohol Use:  Yes


Tobacco Use:  No


Substance Use:  No





Allergies-Medications


(Allergen,Severity, Reaction):  


Coded Allergies:  


     No Known Allergies (Unverified , 9/29/17)


Reported Meds & Prescriptions





Reported Meds & Active Scripts


Active








Review of Systems


ROS Limitations:  Altered Mental Status





Physical Exam


Narrative


GENERAL: Pale, ill-appearing


SKIN: 5 cm laceration to the posterior occiput with a fair amount of bleeding


HEAD: Atraumatic. Normocephalic. 


EYES: Pupils equal and round.  No injection or drainage. 


ENT:  Moist mucous membranes


NECK: Trachea midline.  Cervical collar in place.


CARDIOVASCULAR: Regular rate and rhythm.  No murmur appreciated.


RESPIRATORY: Clear to auscultation. Breath sounds equal bilaterally. 


GASTROINTESTINAL: Abdomen soft, non-tender, nondistended. 


MUSCULOSKELETAL: No obvious deformities. 


NEUROLOGICAL: Says his name and mumbles some sentences.  Has difficulty 

answering simple questions.  No obvious cranial nerve deficits.  Moving all 

extremities.





Data


Data


Last Documented VS





Vital Signs








  Date Time  Temp Pulse Resp B/P (MAP) Pulse Ox O2 Delivery O2 Flow Rate FiO2


 


9/29/17 22:27  71 18 135/91 (106) 97 Room Air  


 


9/29/17 21:48 98.1       








Orders





 Orders


Electrocardiogram (9/29/17 21:45)


Complete Blood Count With Diff (9/29/17 21:45)


Basic Metabolic Panel (Bmp) (9/29/17 21:45)


Prothrombin Time / Inr (Pt) (9/29/17 21:45)


Act Partial Throm Time (Ptt) (9/29/17 21:45)


Urinalysis - C+S If Indicated (9/29/17 21:45)


Magnesium (Mg) (9/29/17 21:45)


Chest, Single Ap (9/29/17 21:45)


Ct Brain W/O Iv Contrast(Rout) (9/29/17 21:45)


Iv Access Insert/Monitor (9/29/17 21:45)


Ecg Monitoring (9/29/17 21:45)


Oximetry (9/29/17 21:45)


Type And Screen (9/29/17 21:45)


Ct Cerv Spine W/O Contrast (9/29/17 )


Spine, Lumbar Comp W/Obliq (9/29/17 )


Pelvis, Ap Only (Routine) (9/29/17 )


Wound Care (9/29/17 21:45)


Tetanus/Diphtheria Tox Adult (Tetanus/Di (9/29/17 21:45)


Lidocai-Epi 1%-1:100,000 Inj (Xylocaine- (9/29/17 21:45)


Ondansetron Inj (Zofran Inj) (9/29/17 22:00)


Ondansetron Inj (Zofran Inj) (9/29/17 21:58)


Alcohol (Ethanol) (9/29/17 22:20)


Levetiracetam 1000 Mg Inj (Keppra 1000 M (9/29/17 23:00)


Admit Order (Ed Use Only) (9/29/17 22:54)


Consult Neurosurgery (9/29/17 )





Labs





Laboratory Tests








Test


  9/29/17


22:20


 


White Blood Count 11.3 TH/MM3 


 


Red Blood Count 4.87 MIL/MM3 


 


Hemoglobin 14.4 GM/DL 


 


Hematocrit 43.1 % 


 


Mean Corpuscular Volume 88.5 FL 


 


Mean Corpuscular Hemoglobin 29.6 PG 


 


Mean Corpuscular Hemoglobin


Concent 33.5 % 


 


 


Red Cell Distribution Width 14.6 % 


 


Platelet Count 203 TH/MM3 


 


Mean Platelet Volume 8.2 FL 


 


Neutrophils (%) (Auto) 70.8 % 


 


Lymphocytes (%) (Auto) 21.3 % 


 


Monocytes (%) (Auto) 5.1 % 


 


Eosinophils (%) (Auto) 2.6 % 


 


Basophils (%) (Auto) 0.2 % 


 


Neutrophils # (Auto) 8.0 TH/MM3 


 


Lymphocytes # (Auto) 2.4 TH/MM3 


 


Monocytes # (Auto) 0.6 TH/MM3 


 


Eosinophils # (Auto) 0.3 TH/MM3 


 


Basophils # (Auto) 0.0 TH/MM3 


 


CBC Comment DIFF FINAL 


 


Differential Comment  


 


Prothrombin Time 10.2 SEC 


 


Prothromb Time International


Ratio 0.9 RATIO 


 


 


Activated Partial


Thromboplast Time 24.7 SEC 


 


 


Blood Urea Nitrogen 14 MG/DL 


 


Creatinine 1.23 MG/DL 


 


Random Glucose 138 MG/DL 


 


Calcium Level 8.9 MG/DL 


 


Magnesium Level 2.1 MG/DL 


 


Sodium Level 138 MEQ/L 


 


Potassium Level 3.5 MEQ/L 


 


Chloride Level 105 MEQ/L 


 


Carbon Dioxide Level 27.1 MEQ/L 


 


Anion Gap 6 MEQ/L 


 


Estimat Glomerular Filtration


Rate 59 ML/MIN 


 


 


Ethyl Alcohol Level


  LESS THAN 3


MG/DL











MDM


Medical Decision Making


Medical Screen Exam Complete:  Yes


Emergency Medical Condition:  Yes


Interpretation(s)


No leukocytosis


Electrolytes are reassuring





Last 24 hours Impressions








Head CT 9/29/17 2145 Signed





Impressions: 





 Service Date/Time:  Friday, September 29, 2017 21:56 - CONCLUSION:  1. 





 Multifocal small cerebral and cerebellar parenchymal hemorrhage. 2. Scattered 





 small supratentorial subarachnoid blood. 3. No mass, mass effect, midline 

shift 





 or acute infarct..     Jose Enrique Chiu MD 


 


Cervical Spine CT 9/29/17 0000 Signed





Impressions: 





 Service Date/Time:  Friday, September 29, 2017 21:55 - CONCLUSION:  Intact 





 cervical spine. Degenerative changes at C5/C6 and C6/C7.     Jose Enrique Chiu MD 








Differential Diagnosis


Intracranial hemorrhage, cervical spine fracture, lumbar fracture, concussion


Narrative Course


This is a 67-year-old male who presents to the emergency department with a 

closed head injury and back pain.  He is altered on exam.  Stat CT was obtained 

which demonstrated multifocal cerebral and cerebellar parenchymal hemorrhage.  

Neurosurgery was consulted and requested the patient be started on Keppra and 

admitted to the intensive care unit.  The trauma surgeon was consulted.  Given 

the patient's back pain CT of the lumbar spine was ordered which demonstrates 

transverse process fractures of L2 and L3.  Scalp laceration was repaired by 

the physician assistant.  Patient did lose a fair amount of blood in the field 

prior to arrival.  Patient will be admitted to the intensive care unit for 

close monitoring.


Critical Care Narrative


Aggregate critical care time was 35 minutes. Time to perform other separately 

billable procedures was not included in the critical care time. My time did not 

include minutes spent treating any other patients simultaneously or on 

activities that did not directly contribute to the patient's treatment.  





The services I provided to this patient were to treat and/or prevent clinically 

significant deterioration that could result in: Disability, death





I provided critical care services requiring my management, as noted below:


Chart data review, documentation time, medication orders and management, vital 

sign assessments/reviewing monitor data, ordering and reviewing lab tests, 

ordering and interpreting/reviewing x-rays and diagnostic studies, care of the 

patient and discussion of the patient with the admitting physicians.





Diagnosis





 Primary Impression:  


 Subarachnoid hemorrhage following injury


 Qualified Codes:  S06.6X1A - Traumatic subarachnoid hemorrhage with loss of 

consciousness of 30 minutes or less, initial encounter


 Additional Impression:  


 Head trauma


 Qualified Codes:  S09.90XA - Unspecified injury of head, initial encounter





Admitting Information


Admitting Physician Requests:  it











Leatha Marte MD Sep 30, 2017 04:40

## 2017-09-30 NOTE — HHI.CCPN
Subjective


Remarks/Hospital Course


67-year-old gentleman fell from a chair and hit the back of his head.  He 

presents with the open laceration on the back of his head with bleeding.  CT of 

the head revealed small traumatic SAH, and few small intraparenchymal 

hemorrhages.  He is neurologically intact,scalp wound sutured by ED. in the 

emergency department he had one episode of hypotension-responded well to 1L 

bolus.  His only complaints is moderate headache.


Subjective:


9/30: Patient up out of bed in chair very somnolent early this afternoon.  

Bedside swallow was successful .  Patient complaints of 6/10 headache .Repeat 

CT suggestive of edema right frontal and parietal areas .  Discussed with 

neurosurgeon Dr. Overton, plan for MRI and initiation of concussion 

protocol.  Formal swallow evaluation planned secondary to somnolence.





Objective





Vital Signs








  Date Time  Temp Pulse Resp B/P (MAP) Pulse Ox O2 Delivery O2 Flow Rate FiO2


 


9/30/17 09:26     98 Nasal Cannula 2.50 


 


9/30/17 06:00  79      


 


9/30/17 04:00 98.3  13 81/52 (62)    














Intake and Output   


 


 9/30/17 9/30/17 10/1/17





 08:00 16:00 00:00


 


Intake Total 2779 ml  


 


Output Total 425 ml  


 


Balance 2354 ml  








Result Diagram:  


9/30/17 0344                                                                   

             9/30/17 0344





Imaging





Last Impressions








Head CT 9/30/17 0800 Signed





Impressions: 





 Service Date/Time:  Saturday, September 30, 2017 09:32 - CONCLUSION:  The 

areas 





 of subarachnoid hemorrhage are no longer visualized however there is 

persistent 





 visualization of the interparenchymal hemorrhage seen within the right frontal 





 lobe and right cerebellum. There is loss of definition of the sulci along the 





 right frontal lobe and right parietal lobe as compared to the left side 





 concerning for increasing edema. Recommend short interval followup consider 





 further evaluation with MRI..     Chrissy Villasenor MD 


 


Thoracic Spine CT 9/30/17 0000 Signed





Impressions: 





 Service Date/Time:  Saturday, September 30, 2017 00:37 - CONCLUSION:  1. 





 Negative for acute traumatic injury in the thoracic spine.     Jeremie Cummings MD 


 


Lumbar Spine CT 9/30/17 0000 Signed





Impressions: 





 Service Date/Time:  Saturday, September 30, 2017 00:37 - CONCLUSION:  1. 





 Left-sided transverse process fractures at L2 and L3.     Jeremie Cummings MD 


 


Chest CT 9/30/17 0000 Signed





Impressions: 





 Service Date/Time:  Saturday, September 30, 2017 00:37 - CONCLUSION:  1. 





 Negative for acute traumatic injury in the thorax. Dependent atelectasis in 

the 





 lungs. Remote granulomatous disease with calcified mediastinal lymph nodes. 2. 





 Moderate-sized hiatal hernia. Remote healed bilateral rib fractures.     

Jeremie Cummings MD 


 


Abdomen/Pelvis CT 9/30/17 0000 Signed





Impressions: 





 Service Date/Time:  Saturday, September 30, 2017 00:37 - CONCLUSION:  1. 





 Left-sided transverse process fractures at L2 and L3. 2. Negative for acute 





 traumatic injury within the abdomen and pelvis.     Jeremie Cummings MD 


 


Cervical Spine CT 9/29/17 0000 Signed





Impressions: 





 Service Date/Time:  Friday, September 29, 2017 21:55 - CONCLUSION:  Intact 





 cervical spine. Degenerative changes at C5/C6 and C6/C7.     Jose Enrique Chiu MD 








Objective Remarks


GENERAL: Well-nourished, well-developed patient, with eyes closed, somnolent, 

arousable following commands


SKIN: Warm and dry.


HEAD: Laceration on the back of the head, sutured, nonbleeding


EYES: No scleral icterus. No injection or drainage. 


NECK: Supple, trachea midline. No JVD or lymphadenopathy.


CARDIOVASCULAR: Regular rate and rhythm without murmurs, gallops, or rubs. 


RESPIRATORY: Breath sounds equal bilaterally. No accessory muscle use.


GASTROINTESTINAL: Abdomen soft, non-tender, nondistended. 


MUSCULOSKELETAL: No cyanosis, or edema. 


BACK: Nontender without obvious deformity. 


NEURO EXAM:


GCS: M 6 V 5 E 4


Mental Status: The patient is alert and oriented to person, place, and time 

with normal speech. 


Cranial Nerves: Visual acuity intact bilaterally. Visual fields normal in all 

quadrants. Pupils are round, reactive to light. Extraocular movements are 

intact without ptosis. Hearing is normal bilaterally. Voice is normal. Tongue 

protrudes midline and moves symmetrically. 


Reflexes: Biceps, patellar, and Achilles are 2/4 bilaterally. No clonus. 


Sensation: Sensation is intact bilaterally to pain and light touch. Two-point 

discrimination is intact. 


Motor: Good muscle tone. Strength is 5/5 bilaterally.


Urinary Catheter:  No


Vascular Central Line Catheter:  No





A/P


Assessment and Plan


Concussion


- Status post fall


- Neuro checks per unit protocol





Traumatic subarachnoid and intraparenchymal hemorrhage


TBI with diffuse axonal injury


- No surgical intervention indicated


- Neuro checks every hour


- Supportive care


- Keppra seizure prophylaxis


- 9/30 repeat CT concerning for edema right frontal and parietal lobes


-Obtain MRI


-Formal swallow evaluation


-Keppra 7 days


-Concussion protocol-minimal stimulation, no bright lights





Left-sided transverse process fractures at L2 and L3


- Per trauma surgeon





Hypertension


- Hold lisinopril due to hypotension in the ED





Dyslipidemia


- Resume Zocor





GERD


- Pantoprazole





DVT GI prophylaxis


- Teds SCDs


- Early aggressive mobilization


- No pharmacological DVT prophylaxis due to ICH


- Pantoprazole








This patient remains critically ill with one or more organ systems which are or 

may become a threat to life. I have spent in excess of 30 minutes 

discontinuously in the care and management of this patient. This time is 

exclusive of procedures, and includes, but is not limited to, evaluation of the 

patient, review of the medical record, discussions with family, consultants, 

nursing staff, or respiratory therapy, and documentation in the medical record.


Physician


Sabrina Sheth MD Sep 30, 2017 13:50

## 2017-09-30 NOTE — OTSOAPIP
TIME SESSION COMPLETED:  AM



TREATMENT TIME:  0    MINS.

CHART REVIEWED.  



PATIENT WAS NOT AVAILABLE SEONCDARY TO BEING OFF THE UNIT FOR A PROCEDURE 



PLAN: WILL SEE PATIENT NEXT TREATMENT DAY 

Therapist: JAZZ RICHARD/L

                          Signature on file

## 2017-09-30 NOTE — RADRPT
EXAM DATE/TIME:  09/30/2017 15:06 

 

[facility] COMPARISON:     

CT BRAIN W/O CONTRAST, September 29, 2017, 21:56.  CT BRAIN W/O CONTRAST, September 30, 2017, 9:32.

       

 

 

INDICATIONS :     

Trauma, abnormal CT.

                     

 

MEDICAL HISTORY :     

Hepatitis C.     

 

SURGICAL HISTORY :     

Inguinal hernia repair.     Orthopaedic.

 

ENCOUNTER:     

Initial

 

ACUITY:     

1 day

 

PAIN SCORE:     

0/10

 

LOCATION:       

cranial 

 

TECHNIQUE:     

Multiplanar, multisequence MRI of the brain was performed without contrast.  Rapid sequence (motion s
uppression) acquisitions were performed.

 

FINDINGS:     

 

SUPRATENTORIAL:     

Examination was performed to characterize questionable edema in the right frontal lobe on today's CT 
scan.  There is no evidence of T2 prolongation in the white matter to suggest generalized cerebral ed
ema in the supratentorial brain.  There is, however, a solitary area of focal T2 prolongation in the 
white matter adjacent to the frontal horn on the right side which correlates with the areas of hyper 
density seen on CT.  There is no susceptibility defect and no signal fall off on the diffusion weight
ed images suggesting this represents a focal area of contusion without blood products.  No signal abn
ormalities with in the ventricles; ventricles are normal in size and no evidence of intraventricular 
blood.  There is some minimal T2 prolongation in the sulci of the posterior right mid convexity parie
dalia-occipital and the high convexity left frontal region characteristic of subarachnoid blood.  

 

POSTERIOR FOSSA:     

There is a focal rounded area of susceptibility artifact in the lateral right tentorium measuring 11 
mm which correlates to the hyperdensity seen on CT and is characteristic of a focal hemorrhage.  Ther
e is only minimal T2 prolongation seen in this area.  The location on the multiplanar images suggest 
that this either represents hemorrhage along the tentorium or in the inferior cortex of the right occ
ipital lobe.

 

EXTRACRANIAL:     

The visualized portions of the orbits and paranasal sinuses are unremarkable.

 

CONCLUSION:     

1. Small area of contusion without blood products in the right frontal white matter adjacent to the f
rontal horn.

2. Small area of subarachnoid hemorrhage posterior right parietal-occipital and high convexity left f
rontal region

3. The hyperdensity seen near the posterior fossa on the right side on prior CT scans is actually loc
alized either to the tentorium or the inferior right occipital cortex.

4. There is no evidence of mass effect or generalized cerebral edema.

 

 

 

 Fredy Marquez MD on September 30, 2017 at 16:02           

Board Certified Radiologist.

 This report was verified electronically.

## 2017-09-30 NOTE — HHI.CCPN
Subjective


Brief History


[place]:  This is a 67-year-old  male who sustained a fall.  Apparently 

he was sitting on a chair with wheels and fell back and hit his head.  + LOC 

for approximately 10 minutes.  When he awoke he was confused.  Head laceration 

was repaired in the ED with staples.





INJURIES:


Scalp wound (staples)


Small cerebral and cerebellar parenchymal hemorrhage


Scattered SAH


OLD rib fx


L2 and L3 transverse process fx (left)


24 Hour Review/Hospital Course


9/30/2017   PTD:  1


Patient observed on rounds awake and alert, but confused.


He is climbing out of bed and removing hospital gown.


Awaiting neurosurgery to see and evaluate.





Objective





Vital Signs








  Date Time  Temp Pulse Resp B/P (MAP) Pulse Ox O2 Delivery O2 Flow Rate FiO2


 


9/30/17 09:26     98 Nasal Cannula 2.50 


 


9/30/17 06:00  79      


 


9/30/17 04:00 98.3  13 81/52 (62)    














Intake and Output   


 


 9/30/17 9/30/17 10/1/17





 08:00 16:00 00:00


 


Intake Total 2779 ml  


 


Output Total 425 ml  


 


Balance 2354 ml  








Result Diagram:  


9/30/17 0344                                                                   

             9/30/17 0344





Imaging





Last 24 hours Impressions








Head CT 9/30/17 0800 Signed





Impressions: 





 Service Date/Time:  Saturday, September 30, 2017 09:32 - CONCLUSION:  The 

areas 





 of subarachnoid hemorrhage are no longer visualized however there is 

persistent 





 visualization of the interparenchymal hemorrhage seen within the right frontal 





 lobe and right cerebellum. There is loss of definition of the sulci along the 





 right frontal lobe and right parietal lobe as compared to the left side 





 concerning for increasing edema. Recommend short interval followup consider 





 further evaluation with MRI..     Chrissy Villasenor MD 


 


Thoracic Spine CT 9/30/17 0000 Signed





Impressions: 





 Service Date/Time:  Saturday, September 30, 2017 00:37 - CONCLUSION:  1. 





 Negative for acute traumatic injury in the thoracic spine.     Jeremie Cummings MD 


 


Lumbar Spine CT 9/30/17 0000 Signed





Impressions: 





 Service Date/Time:  Saturday, September 30, 2017 00:37 - CONCLUSION:  1. 





 Left-sided transverse process fractures at L2 and L3.     Jeremie Cummings MD 


 


Chest CT 9/30/17 0000 Signed





Impressions: 





 Service Date/Time:  Saturday, September 30, 2017 00:37 - CONCLUSION:  1. 





 Negative for acute traumatic injury in the thorax. Dependent atelectasis in 

the 





 lungs. Remote granulomatous disease with calcified mediastinal lymph nodes. 2. 





 Moderate-sized hiatal hernia. Remote healed bilateral rib fractures.     

Jeremie Cummings MD 


 


Abdomen/Pelvis CT 9/30/17 0000 Signed





Impressions: 





 Service Date/Time:  Saturday, September 30, 2017 00:37 - CONCLUSION:  1. 





 Left-sided transverse process fractures at L2 and L3. 2. Negative for acute 





 traumatic injury within the abdomen and pelvis.     Jeremie Cummings MD 


 


Head CT 9/29/17 2145 Signed





Impressions: 





 Service Date/Time:  Friday, September 29, 2017 21:56 - CONCLUSION:  1. 





 Multifocal small cerebral and cerebellar parenchymal hemorrhage. 2. Scattered 





 small supratentorial subarachnoid blood. 3. No mass, mass effect, midline 

shift 





 or acute infarct..     Jose Enrique Chiu MD 








Objective Remarks


GENERAL:  This is a 67-year-old  male standing on the side of the bed.

  Confused.


SKIN: Warm and dry.


HEAD:  Normocephalic.  Staples noted to back of head.


EYES: PERRLA


ENT: No nasal bleeding or discharge.  Mucous membranes pink and moist.


NECK: Trachea midline. No JVD. 


CARDIOVASCULAR: Regular rate and rhythm.  


RESPIRATORY: No accessory muscle use. Lungs are clear to auscultation. Breath 

sounds equal bilaterally. No distress or dyspnea.  


GASTROINTESTINAL:  BS + x 4 quads.  Abdomen soft, non-tender, nondistended. 


MUSCULOSKELETAL: Extremities without cyanosis, or edema. + peripheral pulses x 

4 extremities.  Warm with good capillary refill and sensation.  MAEW.  


NEUROLOGICAL: Awake and alert.  Confused.  Easy to redirect.  Normal speech and 

pattern.


Urinary Catheter Assessment


Urinary Catheter:  No





Vascular Central Line Catheter


Vascular Central Line Catheter:  No





Assessment and Plan


Assessment:  


(1) Subarachnoid hemorrhage following injury


ICD Code:  S06.6X9A - Traumatic subarachnoid hemorrhage with loss of 

consciousness of unspecified duration, initial encounter


Status:  Acute


(2) Head trauma


ICD Code:  S09.90XA - Unspecified injury of head, initial encounter


Status:  Acute


Plan


This is a 67-year-old  male who sustained a fall.  He was sitting on a 

chair with wheels and fell back and hit his head.+ LOC.  Confused.  





INJURIES:  


Scalp wound (staples)


Small cerebral and cerebellar parenchymal hemorrhage


Scattered SAH


OLD rib fx


L2 and L3 transverse process fx (left)





Consult.  [facility].  Neurosurgery.  Case management.


____________________________________________________





Assessment and plan by system:





NEUROLOGICAL: 


Neurosurgery consulted and assisting in management and care 


Awake and alert.


Confused 


Provide analgesia for comfort and pain - Tylenol IV


Serial neuro checks.


9/29:  Small cerebral and cerebellar parenchymal hemorrhage with scattered SAH


9/30:  SAH no longer visualized, however there is persistent visualization of 

inter parenchymal hemorrhage seen within the right frontal lobe and right 

cerebellum.  Loss of definition of the silk along the right frontal lobe and 

right parietal lobe concerning for increased edema.


Obtain MRI brain.


Seizure precautions.


Seizure prophylaxis - Keppra


HOB elevated 30 degrees - 


+ peripheral pulses x 4 extremities.








CARDIOVASCULAR: 


HR - 70's  sinus rhythm.  


BP - hypotensive early a.m. - received normal saline bolus, and now resolved.


Continually monitor for hemodynamic instability (shock and hypotension). 


Follow CMP - 


Electrolyte status - WNL


Electrolyte protocol - 








RESPIRATORY: 


2L NC


O2 Sats - Monitor for hypoxemia 


Lung sounds - CTA


Pulmonary toilet - IS, CDB. 


Bronchodilators - Breathing treatments - duonebs if needed. 


Labs tomorrow 








GASTROINTESTINAL: 


Diet - regular diet


Bowel sounds - + x 4 quads


Bowel regimen - .  Colace.  MOM.  Lactulose.  SennaPRN.  Bisacodyl PRN. 


LBM - 0








RENAL / URINARY: 


Strict I&O - +2354


BUN / creat  13 / 0.92








ENDOCRINE: 


BGM - 136 via am labs








HEMATOLOGY: 


H&H = 11.3 / 34.2


Continue to monitor for signs and symptoms of bleeding. 


Transfuse for < 7.0 


Monitor patient for any bleeding complications. 








INFECTIOUS DISEASE: 


Follow CBC


Monitor for signs and symptoms of infection:   


WBC - 13.7


Afebrile


Administer antipyretics for temp as needed. 





IV LINES: 0





PROPHYLAXIS: 


GI - Pepcid po


DVT - Mechanical VTE with SCDs. Chemical management contraindicated at this 

time due to SAH.








SKIN: 


Warm and dry


Staples to back of scalp.


 





ACTIVITY: 


Status - OOB 


PT and OT ordered. 








CASE MANAGEMENT: 


Consulted for assist with DC planning. 


Placement - disposition - TBD.





EMOTIONAL SUPPORT: 


Provided to patient and family. 


Plan of care discussed. 


Questions answered to the best of my knowledge. 








This patient is currently critically ill and injured and being managed in the 

ICU.  The trauma team will round each day, and evaluate plan of care on a daily 

basis.





Problem Qualifiers





(1) Subarachnoid hemorrhage following injury:  


Qualified Codes:  S06.6X1A - Traumatic subarachnoid hemorrhage with loss of 

consciousness of 30 minutes or less, initial encounter


(2) Head trauma:  


Qualified Codes:  S09.90XA - Unspecified injury of head, initial encounter








Kamilla Ray Sep 30, 2017 12:50

## 2017-09-30 NOTE — RADRPT
EXAM DATE/TIME:  09/30/2017 09:32 

 

[facility] COMPARISON:     

CT BRAIN W/O CONTRAST, September 29, 2017, 21:56.

 

 

INDICATIONS :     

Follow up for subarachnoid hemorrhage.

                      

 

RADIATION DOSE:     

56.35 CTDIvol (mGy) 

 

 

 

MEDICAL HISTORY :     

Cardiovascular disease. Hypertension. 

 

SURGICAL HISTORY :      

None. 

 

ENCOUNTER:      

Initial

 

ACUITY:      

2 days

 

PAIN SCALE:      

Non-responsive

 

LOCATION:        

cranial 

 

TECHNIQUE:     

Multiple contiguous axial images were obtained of the head.  Using automated exposure control and adj
ustment of the mA and/or kV according to patient size, radiation dose was kept as low as reasonably a
chievable to obtain optimal diagnostic quality images.   DICOM format image data is available electro
nically for review and comparison.  

 

FINDINGS:     

     There are small areas of parenchymal increased density involving the right frontal lobe on serie
s 2 image 20 measuring 1.2 cm in greatest dimension and a second small focus of parenchymal increased
 density identified in the right cerebellum which is slightly decreased in size as compared to prior 
exam. The previously noted areas of subarachnoid hemorrhage are no longer visualized. However, there 
is slight effacement of the sulci identified adjacent to the right frontal lobe and right parietal lo
be as compared to the sulci and a similar level on the left side. This is concerning for increasing e
angeles. The ventricles remain normal in shape and size without evidence of midline shift.

 

     The orbits and sinuses are unremarkable. The calvarium is intact..

 

CONCLUSION:     

The areas of subarachnoid hemorrhage are no longer visualized however there is persistent visualizati
on of the interparenchymal hemorrhage seen within the right frontal lobe and right cerebellum. There 
is loss of definition of the sulci along the right frontal lobe and right parietal lobe as compared t
o the left side concerning for increasing edema. Recommend short interval followup consider further e
valuation with MRI..

 

 

 

 Chrissy Villasenor MD on September 30, 2017 at 10:10           

Board Certified Radiologist.

 This report was verified electronically.

## 2017-09-30 NOTE — RADRPT
EXAM DATE/TIME:  09/30/2017 00:37 

 

[facility] COMPARISON:     

No previous studies available for comparison.

 

 

INDICATIONS :     

Trauma, fall.

                      

 

RADIATION DOSE:      

CTDIvol (mGy) ; Reconstructed from previous dataset, no dose

 

 

 

MEDICAL HISTORY :     

None  

 

SURGICAL HISTORY :      

None. 

 

ENCOUNTER:      

Initial

 

ACUITY:      

1 day

 

PAIN SCALE:      

Non-responsive

 

LOCATION:        

Paraspinal 

 

TECHNIQUE:     

Volumetric scanning of the lumbar spine was performed.  Multiplanar reconstructions in the sagittal, 
coronal and oblique axial planes were performed.  Using automated exposure control and adjustment of 
the mA and/or kV according to patient size, radiation dose was kept as low as reasonably achievable t
o obtain optimal diagnostic quality images.   DICOM format image data is available electronically for
 review and comparison.  

 

FINDINGS:     

There are 4 lumbarized vertebra. Mildly displaced left-sided transverse process fractures present at 
L2 and L3. No vertebral body fracture or spondylolisthesis. No significant bony canal stenosis.

 

CONCLUSION:     

1. Left-sided transverse process fractures at L2 and L3.

 

 

 

 Jeremie Cummings MD on September 30, 2017 at 1:22           

Board Certified Radiologist.

 This report was verified electronically.

## 2017-10-01 VITALS
RESPIRATION RATE: 12 BRPM | OXYGEN SATURATION: 100 % | HEART RATE: 78 BPM | TEMPERATURE: 98.5 F | DIASTOLIC BLOOD PRESSURE: 77 MMHG | SYSTOLIC BLOOD PRESSURE: 118 MMHG

## 2017-10-01 VITALS
DIASTOLIC BLOOD PRESSURE: 80 MMHG | RESPIRATION RATE: 22 BRPM | SYSTOLIC BLOOD PRESSURE: 142 MMHG | TEMPERATURE: 98.4 F | OXYGEN SATURATION: 96 % | HEART RATE: 86 BPM

## 2017-10-01 VITALS — HEART RATE: 80 BPM

## 2017-10-01 VITALS
HEART RATE: 76 BPM | OXYGEN SATURATION: 97 % | DIASTOLIC BLOOD PRESSURE: 82 MMHG | RESPIRATION RATE: 20 BRPM | SYSTOLIC BLOOD PRESSURE: 152 MMHG | TEMPERATURE: 98.2 F

## 2017-10-01 VITALS
OXYGEN SATURATION: 94 % | SYSTOLIC BLOOD PRESSURE: 117 MMHG | TEMPERATURE: 98.6 F | DIASTOLIC BLOOD PRESSURE: 58 MMHG | HEART RATE: 82 BPM | RESPIRATION RATE: 17 BRPM

## 2017-10-01 VITALS
HEART RATE: 78 BPM | RESPIRATION RATE: 24 BRPM | TEMPERATURE: 98.8 F | OXYGEN SATURATION: 95 % | SYSTOLIC BLOOD PRESSURE: 125 MMHG | DIASTOLIC BLOOD PRESSURE: 68 MMHG

## 2017-10-01 VITALS — HEART RATE: 66 BPM

## 2017-10-01 VITALS — HEART RATE: 78 BPM

## 2017-10-01 VITALS — OXYGEN SATURATION: 98 %

## 2017-10-01 LAB
ALP SERPL-CCNC: 54 U/L (ref 45–117)
ALT SERPL-CCNC: 17 U/L (ref 12–78)
ANION GAP SERPL CALC-SCNC: 8 MEQ/L (ref 5–15)
AST SERPL-CCNC: 14 U/L (ref 15–37)
BASOPHILS # BLD AUTO: 0.1 TH/MM3 (ref 0–0.2)
BASOPHILS NFR BLD: 0.8 % (ref 0–2)
BILIRUB SERPL-MCNC: 0.4 MG/DL (ref 0.2–1)
BUN SERPL-MCNC: 13 MG/DL (ref 7–18)
CHLORIDE SERPL-SCNC: 107 MEQ/L (ref 98–107)
EOSINOPHIL # BLD: 0.1 TH/MM3 (ref 0–0.4)
EOSINOPHIL NFR BLD: 1.1 % (ref 0–4)
ERYTHROCYTE [DISTWIDTH] IN BLOOD BY AUTOMATED COUNT: 14.4 % (ref 11.6–17.2)
GFR SERPLBLD BASED ON 1.73 SQ M-ARVRAT: 85 ML/MIN (ref 89–?)
HCO3 BLD-SCNC: 25.5 MEQ/L (ref 21–32)
HCT VFR BLD CALC: 31.5 % (ref 39–51)
HEMO FLAGS: (no result)
LYMPHOCYTES # BLD AUTO: 2.7 TH/MM3 (ref 1–4.8)
LYMPHOCYTES NFR BLD AUTO: 26.4 % (ref 9–44)
MCH RBC QN AUTO: 29.6 PG (ref 27–34)
MCHC RBC AUTO-ENTMCNC: 33.9 % (ref 32–36)
MCV RBC AUTO: 87.4 FL (ref 80–100)
MONOCYTES NFR BLD: 7.7 % (ref 0–8)
NEUTROPHILS # BLD AUTO: 6.5 TH/MM3 (ref 1.8–7.7)
NEUTROPHILS NFR BLD AUTO: 64 % (ref 16–70)
PLATELET # BLD: 157 TH/MM3 (ref 150–450)
POTASSIUM SERPL-SCNC: 3.8 MEQ/L (ref 3.5–5.1)
RBC # BLD AUTO: 3.6 MIL/MM3 (ref 4.5–5.9)
SODIUM SERPL-SCNC: 140 MEQ/L (ref 136–145)
WBC # BLD AUTO: 10.2 TH/MM3 (ref 4–11)

## 2017-10-01 RX ADMIN — STANDARDIZED SENNA CONCENTRATE AND DOCUSATE SODIUM SCH TAB: 8.6; 5 TABLET, FILM COATED ORAL at 08:08

## 2017-10-01 RX ADMIN — WATER SCH ML: 1 IRRIGANT IRRIGATION at 08:08

## 2017-10-01 RX ADMIN — NICOTINE SCH PATCH: 21 PATCH, EXTENDED RELEASE TOPICAL at 08:08

## 2017-10-01 RX ADMIN — OXYCODONE HYDROCHLORIDE AND ACETAMINOPHEN PRN TAB: 5; 325 TABLET ORAL at 15:46

## 2017-10-01 RX ADMIN — Medication SCH ML: at 21:24

## 2017-10-01 RX ADMIN — FAMOTIDINE SCH MG: 20 TABLET, FILM COATED ORAL at 21:24

## 2017-10-01 RX ADMIN — Medication SCH ML: at 08:08

## 2017-10-01 RX ADMIN — FAMOTIDINE SCH MG: 20 TABLET, FILM COATED ORAL at 08:05

## 2017-10-01 RX ADMIN — LEVETIRACETAM SCH MLS/HR: 100 INJECTION, SOLUTION, CONCENTRATE INTRAVENOUS at 21:24

## 2017-10-01 RX ADMIN — LEVETIRACETAM SCH MLS/HR: 100 INJECTION, SOLUTION, CONCENTRATE INTRAVENOUS at 08:05

## 2017-10-01 RX ADMIN — OXYCODONE HYDROCHLORIDE AND ACETAMINOPHEN PRN TAB: 5; 325 TABLET ORAL at 21:23

## 2017-10-01 RX ADMIN — MAGNESIUM HYDROXIDE SCH ML: 400 SUSPENSION ORAL at 21:24

## 2017-10-01 RX ADMIN — MAGNESIUM HYDROXIDE SCH ML: 400 SUSPENSION ORAL at 08:08

## 2017-10-01 RX ADMIN — STANDARDIZED SENNA CONCENTRATE AND DOCUSATE SODIUM SCH TAB: 8.6; 5 TABLET, FILM COATED ORAL at 21:24

## 2017-10-01 RX ADMIN — OXYCODONE HYDROCHLORIDE AND ACETAMINOPHEN PRN TAB: 5; 325 TABLET ORAL at 12:00

## 2017-10-01 RX ADMIN — ACETAMINOPHEN PRN MLS/HR: 10 INJECTION, SOLUTION INTRAVENOUS at 02:05

## 2017-10-01 RX ADMIN — CHLORHEXIDINE GLUCONATE SCH PACK: 500 CLOTH TOPICAL at 04:00

## 2017-10-01 NOTE — HHI.CCPN
Subjective


Remarks/Hospital Course


67-year-old gentleman fell from a chair and hit the back of his head.  He 

presents with the open laceration on the back of his head with bleeding.  CT of 

the head revealed small traumatic SAH, and few small intraparenchymal 

hemorrhages.  He is neurologically intact,scalp wound sutured by ED. in the 

emergency department he had one episode of hypotension-responded well to 1L 

bolus.  His only complaints is moderate headache.


Subjective:


9/30: Patient up out of bed in chair very somnolent early this afternoon.  

Bedside swallow was successful .  Patient complaints of 6/10 headache .Repeat 

CT suggestive of edema right frontal and parietal areas .  Discussed with 

neurosurgeon Dr. Overton, plan for MRI and initiation of concussion 

protocol.  Formal swallow evaluation planned secondary to somnolence.





10/1: No acute events overnight.  Neurological status improved.  MRI revealed 

no generalized cerebral edema.  Patient alert oriented ambulating around room 

and to bathroom.  Patient tolerating diet.  Patient complains of mild left-

sided abdominal discomfort discussed with and management per trauma service.





Objective





Vital Signs








  Date Time  Temp Pulse Resp B/P (MAP) Pulse Ox O2 Delivery O2 Flow Rate FiO2


 


10/1/17 10:00  80      


 


10/1/17 08:00 98.4  22 142/80 (100) 96   


 


10/1/17 07:00      Nasal Cannula 2.00 














Intake and Output   


 


 10/1/17 10/1/17 10/2/17





 08:00 16:00 00:00


 


Intake Total 1650 ml 103 ml 


 


Balance 1650 ml 103 ml 








Result Diagram:  


10/1/17 0527                                                                   

             10/1/17 0527





Imaging





Last Impressions








Head CT 9/30/17 0800 Signed





Impressions: 





 Service Date/Time:  Saturday, September 30, 2017 09:32 - CONCLUSION:  The 

areas 





 of subarachnoid hemorrhage are no longer visualized however there is 

persistent 





 visualization of the interparenchymal hemorrhage seen within the right frontal 





 lobe and right cerebellum. There is loss of definition of the sulci along the 





 right frontal lobe and right parietal lobe as compared to the left side 





 concerning for increasing edema. Recommend short interval followup consider 





 further evaluation with MRI..     Chrissy Villasenor MD 


 


Thoracic Spine CT 9/30/17 0000 Signed





Impressions: 





 Service Date/Time:  Saturday, September 30, 2017 00:37 - CONCLUSION:  1. 





 Negative for acute traumatic injury in the thoracic spine.     Jeremie Cummings MD 


 


Lumbar Spine CT 9/30/17 0000 Signed





Impressions: 





 Service Date/Time:  Saturday, September 30, 2017 00:37 - CONCLUSION:  1. 





 Left-sided transverse process fractures at L2 and L3.     Jeremie Cummings MD 


 


Chest CT 9/30/17 0000 Signed





Impressions: 





 Service Date/Time:  Saturday, September 30, 2017 00:37 - CONCLUSION:  1. 





 Negative for acute traumatic injury in the thorax. Dependent atelectasis in 

the 





 lungs. Remote granulomatous disease with calcified mediastinal lymph nodes. 2. 





 Moderate-sized hiatal hernia. Remote healed bilateral rib fractures.     

Jeremie Cummings MD 


 


Abdomen/Pelvis CT 9/30/17 0000 Signed





Impressions: 





 Service Date/Time:  Saturday, September 30, 2017 00:37 - CONCLUSION:  1. 





 Left-sided transverse process fractures at L2 and L3. 2. Negative for acute 





 traumatic injury within the abdomen and pelvis.     Jeremie Cummings MD 


 


Cervical Spine CT 9/29/17 0000 Signed





Impressions: 





 Service Date/Time:  Friday, September 29, 2017 21:55 - CONCLUSION:  Intact 





 cervical spine. Degenerative changes at C5/C6 and C6/C7.     Jose Enrique Chiu MD 








Objective Remarks


GENERAL: Well-nourished, well-developed patient, awake and alert ambulating in 

room


SKIN: Warm and dry.


HEAD: Laceration on the back of the head, sutured, nonbleeding


EYES: No scleral icterus. No injection or drainage. 


NECK: Supple, trachea midline. No JVD or lymphadenopathy.


CARDIOVASCULAR: Regular rate and rhythm without murmurs, gallops, or rubs. 


RESPIRATORY: Breath sounds equal bilaterally. No accessory muscle use.


GASTROINTESTINAL: Abdomen soft, non-tender, nondistended.  No tenderness to 

palpation


MUSCULOSKELETAL: No cyanosis, or edema. 


BACK: Nontender without obvious deformity. 


NEURO EXAM:


GCS: M 6 V 5 E 4


Mental Status: The patient is alert and oriented to person, place, and time 

with normal speech. 


Cranial Nerves: Visual acuity intact bilaterally. Visual fields normal in all 

quadrants. Pupils are round, reactive to light. Extraocular movements are 

intact without ptosis. Hearing is normal bilaterally. Voice is normal. Tongue 

protrudes midline and moves symmetrically. 


Reflexes: Biceps, patellar, and Achilles are 2/4 bilaterally. No clonus. 


Sensation: Sensation is intact bilaterally to pain and light touch. Two-point 

discrimination is intact. 


Motor: Good muscle tone. Strength is 5/5 bilaterally.





A/P


Assessment and Plan


Concussion


- Status post fall


- Neuro checks per unit protocol





Traumatic subarachnoid and intraparenchymal hemorrhage


TBI with diffuse axonal injury


- No surgical intervention indicated


- Neuro checks every hour


- Supportive care


- Keppra seizure prophylaxis


- 9/30 repeat CT concerning for edema right frontal and parietal lobes


- MRI-no generalized cerebral edema


-Keppra 7 days


-Concussion protocol-minimal stimulation, no bright lights





Left-sided transverse process fractures at L2 and L3


- Per trauma surgeon





Hypertension


- Hold lisinopril due to hypotension in the ED


Abdominal pain


Left side mild visual VAS scale 6/10, management per trauma service ordered 

Percocet





Dyslipidemia


- Resume Zocor





GERD


- Pantoprazole





DVT GI prophylaxis


- Teds SCDs


- Early aggressive mobilization


- No pharmacological DVT prophylaxis due to ICH


- Pantoprazole


Dispo:


Level 3


Thank you for allowing participation in the care of this patient.  Critical 

care medicine will sign off.


Physician


Sabrina Sheth MD Oct 1, 2017 14:01

## 2017-10-01 NOTE — HHI.NSPN
__________________________________________________





History


Interval History


9/29: 67 y.o male fell from a chair and hit the back of his head,open back of 

his head with bleeding-already seen and worked up by the ED,has SAH,gcs 15,

neuro intact,scalp wound sutured by ED-episode of hypotension-responded well to 

1L bolus,stat CT chest,AP ordered-at time of my exam patient c/o headache.


10/1: Patient doing well and wants to go home. AAOx2(not date), GSC15, mild 

headache. No acute events overnight.





Exam


Results





Vital Signs








  Date Time  Temp Pulse Resp B/P (MAP) Pulse Ox O2 Delivery O2 Flow Rate FiO2


 


10/1/17 10:00  80      


 


10/1/17 08:00 98.4  22 142/80 (100) 96   


 


10/1/17 07:00      Nasal Cannula 2.00 














Intake and Output   


 


 10/1/17 10/1/17 10/2/17





 08:00 16:00 00:00


 


Intake Total 1650 ml 103 ml 


 


Balance 1650 ml 103 ml 








Physical Examination


AAOx2


EOMI


FS


MAESx4


FCCx4


Lab, Micro, Other Results





Last Impressions








Head CT 9/30/17 0800 Signed





Impressions: 





 Service Date/Time:  Saturday, September 30, 2017 09:32 - CONCLUSION:  The 

areas 





 of subarachnoid hemorrhage are no longer visualized however there is 

persistent 





 visualization of the interparenchymal hemorrhage seen within the right frontal 





 lobe and right cerebellum. There is loss of definition of the sulci along the 





 right frontal lobe and right parietal lobe as compared to the left side 





 concerning for increasing edema. Recommend short interval followup consider 





 further evaluation with MRI..     Chrissy Villasenor MD 


 


Thoracic Spine CT 9/30/17 0000 Signed





Impressions: 





 Service Date/Time:  Saturday, September 30, 2017 00:37 - CONCLUSION:  1. 





 Negative for acute traumatic injury in the thoracic spine.     Jeremie Cummings MD 


 


Lumbar Spine CT 9/30/17 0000 Signed





Impressions: 





 Service Date/Time:  Saturday, September 30, 2017 00:37 - CONCLUSION:  1. 





 Left-sided transverse process fractures at L2 and L3.     Jeremie Cummings MD 


 


Chest CT 9/30/17 0000 Signed





Impressions: 





 Service Date/Time:  Saturday, September 30, 2017 00:37 - CONCLUSION:  1. 





 Negative for acute traumatic injury in the thorax. Dependent atelectasis in 

the 





 lungs. Remote granulomatous disease with calcified mediastinal lymph nodes. 2. 





 Moderate-sized hiatal hernia. Remote healed bilateral rib fractures.     

Jeremie Cummings MD 


 


Brain MRI 9/30/17 0000 Signed





Impressions: 





 Service Date/Time:  Saturday, September 30, 2017 15:06 - CONCLUSION:  1. Small 





 area of contusion without blood products in the right frontal white matter 





 adjacent to the frontal horn. 2. Small area of subarachnoid hemorrhage 

posterior 





 right parietal-occipital and high convexity left frontal region 3. The 





 hyperdensity seen near the posterior fossa on the right side on prior CT scans 





 is actually localized either to the tentorium or the inferior right occipital 





 cortex. 4. There is no evidence of mass effect or generalized cerebral edema. 

   





  Fredy Marquez MD 


 


Abdomen/Pelvis CT 9/30/17 0000 Signed





Impressions: 





 Service Date/Time:  Saturday, September 30, 2017 00:37 - CONCLUSION:  1. 





 Left-sided transverse process fractures at L2 and L3. 2. Negative for acute 





 traumatic injury within the abdomen and pelvis.     Jeremie Cummings MD 


 


Cervical Spine CT 9/29/17 0000 Signed





Impressions: 





 Service Date/Time:  Friday, September 29, 2017 21:55 - CONCLUSION:  Intact 





 cervical spine. Degenerative changes at C5/C6 and C6/C7.     Jose Enrique Chiu MD 








Current Medications








 Medications


  (Trade)  Dose


 Ordered  Sig/Kirby


 Route


 PRN Reason  Start Time


 Stop Time Status Last Admin


Dose Admin


 


 Sodium Chloride


  (NS Flush)  2 ml  UNSCH  PRN


 IV FLUSH


 FLUSH AFTER USING IV ACCESS  9/29/17 23:00


     


 


 


 Sodium Chloride


  (NS Flush)  2 ml  BID


 IV FLUSH


   9/30/17 09:00


    10/1/17 08:08


 


 


 Acetaminophen


  (Tylenol)  650 mg  Q6H  PRN


 PO


 FEVER >101F  9/29/17 23:00


    9/30/17 20:14


 


 


 Ondansetron HCl


  (Zofran Inj)  4 mg  Q6H  PRN


 IV PUSH


 NAUSEA OR VOMITING  9/29/17 23:00


     


 


 


 Senna/Docusate


 Sodium


  (Kathleen-Colace)  1 tab  BID


 PO


   9/30/17 09:00


    9/30/17 20:17


 


 


 Sennosides


  (Senokot)  17.2 mg  Q12H  PRN


 PO


 MODERATE - SEVERE CONSTIPATION  9/29/17 23:00


     


 


 


 Bisacodyl


  (Dulcolax Supp)  10 mg  DAILY  PRN


 RECTAL


 SEVERE CONSITIPATION  9/29/17 23:00


     


 


 


 Famotidine


  (Pepcid)  20 mg  BID


 PO


   9/30/17 09:00


    10/1/17 08:05


 


 


 Levetriacetam 500


 mg/Sodium Chloride  105 ml @ 


 420 mls/hr  Q12HR


 IV


   9/30/17 00:45


    10/1/17 08:05


 


 


 Lactulose


  (Lactulose Liq)  30 ml  DAILY


 PO


   9/30/17 09:00


     


 


 


 Magnesium


 Hydroxide


  (Milk Of


 Magnesia Liq)  30 ml  Q12H


 PO


   9/30/17 09:00


    9/30/17 20:18


 


 


 Nicotine


  (Habitrol 21 Mg


 Patch.24 Hr)  1 patch  DAILY


 T-DERMAL


   9/30/17 19:39


    10/1/17 08:08


 


 


 Miscellaneous


 Information  1  DAILY


 T-DERMAL


   9/30/17 19:39


    10/1/17 08:08


 


 


 Acetaminophen  100 ml @ 


 400 mls/hr  Q8H  PRN


 IV


 HEADACHE  10/1/17 10:30


     


 


 


 Oxycodone/


 Acetaminophen


  (Percocet  5-325


 Mg)  1 tab  Q4H  PRN


 PO


 pain 3-10  10/1/17 10:30


    10/1/17 12:00


 








Laboratory Tests








Test


  9/29/17


22:20 9/30/17


01:00 9/30/17


02:15 9/30/17


03:44


 


Activated Partial


Thromboplast Time 24.7 SEC 


  


  


  


 


 


Blood Urea Nitrogen 14 MG/DL    


 


Creatinine 1.23 MG/DL    


 


Random Glucose 138 MG/DL    


 


Calcium Level 8.9 MG/DL    


 


Magnesium Level 2.1 MG/DL    


 


Sodium Level 138 MEQ/L    


 


Potassium Level 3.5 MEQ/L    


 


Chloride Level 105 MEQ/L    


 


Carbon Dioxide Level 27.1 MEQ/L    


 


Ethyl Alcohol Level


  LESS THAN 3


MG/DL 


  


  


 


 


Nasal Screen MRSA (PCR)


  


  MRSA NOT


DETECTED 


  


 


 


Urine Color   YELLOW  


 


Urine Turbidity   CLEAR  


 


Urine pH   5.5  


 


Urine Specific Gravity


  


  


  GREATER THAN


1.050 


 


 


Urine Protein   TRACE mg/dL  


 


Urine Glucose (UA)   NEG mg/dL  


 


Urine Ketones   10 mg/dL  


 


Urine Occult Blood   NEG  


 


Urine Nitrite   NEG  


 


Urine Bilirubin   NEG  


 


Urine Urobilinogen


  


  


  LESS THAN 2.0


MG/DL 


 


 


Urine Leukocyte Esterase   NEG  


 


Urine RBC   1 /hpf  


 


Urine WBC   2 /hpf  


 


Urine Squamous Epithelial


Cells 


  


  <1 /hpf 


  


 


 


Urine Hyaline Casts   3 /lpf  


 


Urine Mucus   FEW /lpf  


 


Microscopic Urinalysis Comment


  


  


  CULT NOT


INDICATED 


 


 


Prothrombin Time    11.2 SEC 


 


Prothromb Time International


Ratio 


  


  


  1.0 RATIO 


 


 


Test


  10/1/17


05:27 


  


  


 


 


White Blood Count 10.2 TH/MM3    


 


Red Blood Count 3.60 MIL/MM3    


 


Hemoglobin 10.7 GM/DL    


 


Hematocrit 31.5 %    


 


Mean Corpuscular Volume 87.4 FL    


 


Mean Corpuscular Hemoglobin 29.6 PG    


 


Mean Corpuscular Hemoglobin


Concent 33.9 % 


  


  


  


 


 


Red Cell Distribution Width 14.4 %    


 


Platelet Count 157 TH/MM3    


 


Mean Platelet Volume 8.5 FL    


 


Neutrophils (%) (Auto) 64.0 %    


 


Lymphocytes (%) (Auto) 26.4 %    


 


Monocytes (%) (Auto) 7.7 %    


 


Eosinophils (%) (Auto) 1.1 %    


 


Basophils (%) (Auto) 0.8 %    


 


Neutrophils # (Auto) 6.5 TH/MM3    


 


Lymphocytes # (Auto) 2.7 TH/MM3    


 


Monocytes # (Auto) 0.8 TH/MM3    


 


Eosinophils # (Auto) 0.1 TH/MM3    


 


Basophils # (Auto) 0.1 TH/MM3    


 


CBC Comment DIFF FINAL    


 


Differential Comment     


 


Blood Urea Nitrogen 13 MG/DL    


 


Creatinine 0.89 MG/DL    


 


Random Glucose 102 MG/DL    


 


Total Protein 6.6 GM/DL    


 


Albumin 3.5 GM/DL    


 


Calcium Level 8.3 MG/DL    


 


Alkaline Phosphatase 54 U/L    


 


Aspartate Amino Transf


(AST/SGOT) 14 U/L 


  


  


  


 


 


Alanine Aminotransferase


(ALT/SGPT) 17 U/L 


  


  


  


 


 


Total Bilirubin 0.4 MG/DL    


 


Sodium Level 140 MEQ/L    


 


Potassium Level 3.8 MEQ/L    


 


Chloride Level 107 MEQ/L    


 


Carbon Dioxide Level 25.5 MEQ/L    


 


Anion Gap 8 MEQ/L    


 


Estimat Glomerular Filtration


Rate 85 ML/MIN 


  


  


  


 











Medical Decision Making


Impression and Plan


\Assessment and Plan


Diagnosis:  


(1) Subarachnoid hemorrhage following injury


ICD Codes:  S06.6X9A - Traumatic subarachnoid hemorrhage with loss of 

consciousness of unspecified duration, initial encounter


Status:  Acute


(2) Head trauma


ICD Codes:  S09.90XA - Unspecified injury of head, initial encounter


Status:  Acute


Assessment and Plan


Patient has Severe TBI with JUANITA 


Recommend Concussion Protocol = no bright lights, TV's, cell phones for 2 weeks 

/ brain rest.


Recommend Keppra 500mg IV or PO for 7 days only 


MRI of the Brain: Confirms Diffuse Axial Injury


PT/OT


ok with diet


step down in am 10/2 


No Further Neurosurgery procedures or recommendations at this time.


Will Sign-off


Please call with any questions or concerns.











Brett Overton MD Oct 1, 2017 12:11

## 2017-10-01 NOTE — OTSOAPIP
TIME SESSION COMPLETED:  1300



TREATMENT TIME:  0    MINS.

CHART REVIEWED.  



INTERDISCIPLINARY COMMUNICATION: NURSE "ERIN" REQUEST TO HOLD ASSESSMENT FOR TODAY. 
PATIENT WAS AGITATE TODAY AND IS NOW RESTING. 



PLAN: WILL SEE PATIENT NEXT TREATMENT DAY





Therapist: JAZZ RICHARD/LINDSAY

                          Signature on file

## 2017-10-01 NOTE — HHI.CCPN
Subjective


Brief History


[place]:  This is a 67-year-old  male who sustained a fall.  Apparently 

he was sitting on a chair with wheels and fell back and hit his head.  + LOC 

for approximately 10 minutes.  When he awoke he was confused.  Head laceration 

was repaired in the ED with staples.





INJURIES:


Scalp wound (staples)


Small cerebral and cerebellar parenchymal hemorrhage


Scattered SAH


OLD rib fx


L2 and L3 transverse process fx (left)


24 Hour Review/Hospital Course


9/30/2017   PTD:  1


Patient observed on rounds awake and alert, but confused.


He is climbing out of bed and removing hospital gown.


Awaiting neurosurgery to see and evaluate.


10/2/2017   PTD:  2


Pt sitting up in bed.  Wife at bedside.  Asking to go home.


More lucid and appropriate today compared to yesterday.


Still needs close neuro monitoring.  However pt is hemodynamically stable and 

may transfer the med surg floor when a bed becomes available.


 (Kamilla Ray)


24 Hour Review/Hospital Course


It should be noted that patient has requesting to be discharged from the 

hospital however in the face of brain injury patient cannot make his own 

decisions and I cannot allow him to leave AMA


Patient can transfer however to floor and be managed there still he improves


Will discuss with Dr. Hill neuropsychologist as far as the neuro stimulation 

of modulation of the mood is concerned


 (Jp Yi MD)





Objective





Vital Signs








  Date Time  Temp Pulse Resp B/P (MAP) Pulse Ox O2 Delivery O2 Flow Rate FiO2


 


10/1/17 06:00  66      


 


10/1/17 04:00 98.5  12 118/77 (91) 100   


 


9/30/17 21:18      Nasal Cannula 2.00 














Intake and Output   


 


 10/1/17 10/1/17 10/2/17





 08:00 16:00 00:00


 


Intake Total 1650 ml 103 ml 


 


Balance 1650 ml 103 ml 








 (Kamilla Ray)


Result Diagram:  


10/1/17 0527                                                                   

             10/1/17 0527





Objective Remarks


GENERAL:  This is a 67-year-old  male sitting up in bed.


SKIN: Warm and dry.


HEAD:  Normocephalic.  Staples noted to back of head.


EYES: PERRLA


ENT: No nasal bleeding or discharge.  Mucous membranes pink and moist.


NECK: Trachea midline. No JVD. 


CARDIOVASCULAR: Regular rate and rhythm.  


RESPIRATORY: No accessory muscle use. Lungs are clear to auscultation. Breath 

sounds equal bilaterally. No distress or dyspnea.  


GASTROINTESTINAL:  BS + x 4 quads.  Abdomen soft, non-tender, nondistended. 


MUSCULOSKELETAL: Extremities without cyanosis, or edema. + peripheral pulses x 

4 extremities.  Warm with good capillary refill and sensation.  MAEW.  


NEUROLOGICAL: Awake and alert.  More appropriate today compared to yesterday.   

Normal speech and pattern.


 (Kamilla Ray ARNP)





Urinary Catheter Assessment


Urinary Catheter:  No


 (Kamilla RayP)





Vascular Central Line Catheter


Vascular Central Line Catheter:  No


 (Kamilla Ray)





Assessment and Plan


Assessment:  


(1) Subarachnoid hemorrhage following injury


ICD Code:  S06.6X9A - Traumatic subarachnoid hemorrhage with loss of 

consciousness of unspecified duration, initial encounter


Status:  Acute


(2) Head trauma


ICD Code:  S09.90XA - Unspecified injury of head, initial encounter


Status:  Acute


Plan


This is a 67-year-old  male who sustained a fall.  He was sitting on a 

chair with wheels and fell back and hit his head.+ LOC.  Confused.  





INJURIES:  


Scalp wound (staples)


Small cerebral and cerebellar parenchymal hemorrhage


Scattered SAH


OLD rib fx


L2 and L3 transverse process fx (left)





Consult.  [facility].  Neurosurgery.  Case management.


____________________________________________________





Assessment and plan by system:





NEUROLOGICAL: 


Neurosurgery consulted and assisting in management and care 


Awake and alert.


More appropriate today


Continually asking to go home.


Provide analgesia for comfort and pain - Tylenol IV added Percocet po.


Serial neuro checks.


9/29:  Small cerebral and cerebellar parenchymal hemorrhage with scattered SAH


9/30:  SAH no longer visualized, however there is persistent visualization of 

inter parenchymal hemorrhage seen within the right frontal lobe and right 

cerebellum.  Loss of definition of the silk along the right frontal lobe and 

right parietal lobe concerning for increased edema.


9/30:  MRI brain - .


Seizure precautions.


Seizure prophylaxis - Keppra


HOB elevated 30 degrees - 


+ peripheral pulses x 4 extremities.








CARDIOVASCULAR: 


HR -  80-86 sinus rhythm.  


BP - 142 / 80


Continually monitor for hemodynamic instability (shock and hypotension). 


Follow CMP - 


Electrolyte status - WNL


Electrolyte protocol - 








RESPIRATORY: 


2L NC


O2 Sats - Monitor for hypoxemia 


Lung sounds - CTA


Pulmonary toilet - IS, CDB. 


Bronchodilators - Breathing treatments - duonebs if needed. 


Labs tomorrow 








GASTROINTESTINAL: 


Diet - regular diet


Bowel sounds - + x 4 quads


Bowel regimen - .  Colace.  MOM.  Lactulose.  SennaPRN.  Bisacodyl PRN. 


LBM - 0








RENAL / URINARY: 


Strict I&O - +1450


BUN / creat  13 / 0.89








ENDOCRINE: 


BGM - 102 via am labs








HEMATOLOGY: 


H&H = 10.7 / 31.5


Continue to monitor for signs and symptoms of bleeding. 


Transfuse for < 7.0 


Monitor patient for any bleeding complications. 








INFECTIOUS DISEASE: 


Follow CBC


Monitor for signs and symptoms of infection:   


WBC - 10.2


Afebrile


Administer antipyretics for temp as needed. 





IV LINES: 0





PROPHYLAXIS: 


GI - Pepcid po


DVT - Mechanical VTE with SCDs. Chemical management contraindicated at this 

time due to SAH.








SKIN: 


Warm and dry


Staples to back of scalp.


 





ACTIVITY: 


Status - OOB 


PT and OT ordered. 








CASE MANAGEMENT: 


Consulted for assist with DC planning. 


Placement - disposition - TBD.





EMOTIONAL SUPPORT: 


Provided to patient and family.  Discussed with patient and wife at length the 

importance of staying in the hospital for continued neuro monitoring due to 

TBI.  


Plan of care discussed. 


Questions answered to the best of my knowledge. 








This patient is currently critically ill and injured and being managed in the 

ICU.  The trauma team will round each day, and evaluate plan of care on a daily 

basis.


 (Kamilla Ray)





Problem Qualifiers





(1) Subarachnoid hemorrhage following injury:  


Qualified Codes:  S06.6X1A - Traumatic subarachnoid hemorrhage with loss of 

consciousness of 30 minutes or less, initial encounter


(2) Head trauma:  


Qualified Codes:  S09.90XA - Unspecified injury of head, initial encounter








Kamilla Ray Oct 1, 2017 10:33


Jp Yi MD Oct 1, 2017 12:34

## 2017-10-02 VITALS
RESPIRATION RATE: 17 BRPM | HEART RATE: 70 BPM | DIASTOLIC BLOOD PRESSURE: 78 MMHG | OXYGEN SATURATION: 96 % | SYSTOLIC BLOOD PRESSURE: 126 MMHG | TEMPERATURE: 97.6 F

## 2017-10-02 VITALS
HEART RATE: 79 BPM | OXYGEN SATURATION: 95 % | TEMPERATURE: 97.8 F | SYSTOLIC BLOOD PRESSURE: 156 MMHG | DIASTOLIC BLOOD PRESSURE: 106 MMHG | RESPIRATION RATE: 17 BRPM

## 2017-10-02 VITALS
OXYGEN SATURATION: 94 % | SYSTOLIC BLOOD PRESSURE: 148 MMHG | HEART RATE: 74 BPM | TEMPERATURE: 98.3 F | RESPIRATION RATE: 20 BRPM | DIASTOLIC BLOOD PRESSURE: 81 MMHG

## 2017-10-02 VITALS
TEMPERATURE: 98.2 F | RESPIRATION RATE: 20 BRPM | OXYGEN SATURATION: 97 % | SYSTOLIC BLOOD PRESSURE: 153 MMHG | HEART RATE: 71 BPM | DIASTOLIC BLOOD PRESSURE: 85 MMHG

## 2017-10-02 RX ADMIN — WATER SCH ML: 1 IRRIGANT IRRIGATION at 07:30

## 2017-10-02 RX ADMIN — MAGNESIUM HYDROXIDE SCH ML: 400 SUSPENSION ORAL at 07:30

## 2017-10-02 RX ADMIN — FAMOTIDINE SCH MG: 20 TABLET, FILM COATED ORAL at 07:31

## 2017-10-02 RX ADMIN — OXYCODONE HYDROCHLORIDE AND ACETAMINOPHEN PRN TAB: 5; 325 TABLET ORAL at 07:33

## 2017-10-02 RX ADMIN — STANDARDIZED SENNA CONCENTRATE AND DOCUSATE SODIUM SCH TAB: 8.6; 5 TABLET, FILM COATED ORAL at 07:31

## 2017-10-02 RX ADMIN — NICOTINE SCH PATCH: 21 PATCH, EXTENDED RELEASE TOPICAL at 07:32

## 2017-10-02 RX ADMIN — Medication SCH ML: at 07:34

## 2017-10-02 RX ADMIN — LEVETIRACETAM SCH MLS/HR: 100 INJECTION, SOLUTION, CONCENTRATE INTRAVENOUS at 07:37

## 2017-10-02 RX ADMIN — OXYCODONE HYDROCHLORIDE AND ACETAMINOPHEN PRN TAB: 5; 325 TABLET ORAL at 12:31
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
## 2018-03-08 ENCOUNTER — HOSPITAL ENCOUNTER (INPATIENT)
Dept: HOSPITAL 17 - NEPI | Age: 68
LOS: 15 days | DRG: 955 | End: 2018-03-23
Attending: SURGERY | Admitting: SURGERY
Payer: COMMERCIAL

## 2018-03-08 VITALS — HEART RATE: 115 BPM

## 2018-03-08 VITALS — BODY MASS INDEX: 30.07 KG/M2 | WEIGHT: 222.01 LBS | HEIGHT: 72 IN

## 2018-03-08 VITALS — OXYGEN SATURATION: 100 %

## 2018-03-08 DIAGNOSIS — T79.4XXA: ICD-10-CM

## 2018-03-08 DIAGNOSIS — Z86.74: ICD-10-CM

## 2018-03-08 DIAGNOSIS — S01.511A: ICD-10-CM

## 2018-03-08 DIAGNOSIS — Z51.5: ICD-10-CM

## 2018-03-08 DIAGNOSIS — S72.401B: ICD-10-CM

## 2018-03-08 DIAGNOSIS — S32.039A: ICD-10-CM

## 2018-03-08 DIAGNOSIS — S02.601A: ICD-10-CM

## 2018-03-08 DIAGNOSIS — E87.70: ICD-10-CM

## 2018-03-08 DIAGNOSIS — J90: ICD-10-CM

## 2018-03-08 DIAGNOSIS — S27.322A: ICD-10-CM

## 2018-03-08 DIAGNOSIS — S42.112A: ICD-10-CM

## 2018-03-08 DIAGNOSIS — J45.909: ICD-10-CM

## 2018-03-08 DIAGNOSIS — S52.351A: ICD-10-CM

## 2018-03-08 DIAGNOSIS — S02.2XXA: ICD-10-CM

## 2018-03-08 DIAGNOSIS — I10: ICD-10-CM

## 2018-03-08 DIAGNOSIS — S02.19XA: ICD-10-CM

## 2018-03-08 DIAGNOSIS — S59.201A: ICD-10-CM

## 2018-03-08 DIAGNOSIS — D64.9: ICD-10-CM

## 2018-03-08 DIAGNOSIS — S52.251A: ICD-10-CM

## 2018-03-08 DIAGNOSIS — S22.43XA: ICD-10-CM

## 2018-03-08 DIAGNOSIS — G93.40: ICD-10-CM

## 2018-03-08 DIAGNOSIS — S02.40FA: ICD-10-CM

## 2018-03-08 DIAGNOSIS — R40.2432: ICD-10-CM

## 2018-03-08 DIAGNOSIS — S22.069A: ICD-10-CM

## 2018-03-08 DIAGNOSIS — J96.00: ICD-10-CM

## 2018-03-08 DIAGNOSIS — R00.0: ICD-10-CM

## 2018-03-08 DIAGNOSIS — D18.1: ICD-10-CM

## 2018-03-08 DIAGNOSIS — S42.352A: ICD-10-CM

## 2018-03-08 DIAGNOSIS — S02.40EA: ICD-10-CM
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
## 2018-03-08 DIAGNOSIS — E87.2: ICD-10-CM

## 2018-03-08 DIAGNOSIS — S02.413A: ICD-10-CM

## 2018-03-08 DIAGNOSIS — S89.001A: ICD-10-CM

## 2018-03-08 LAB
ALBUMIN SERPL-MCNC: 2.1 GM/DL (ref 3.4–5)
ALP SERPL-CCNC: 62 U/L (ref 45–117)
ALT SERPL-CCNC: 22 U/L (ref 12–78)
AST SERPL-CCNC: 40 U/L (ref 15–37)
BASOPHILS # BLD AUTO: 0 TH/MM3 (ref 0–0.2)
BASOPHILS # BLD AUTO: 0.2 TH/MM3 (ref 0–0.2)
BASOPHILS NFR BLD: 0.2 % (ref 0–2)
BASOPHILS NFR BLD: 1 % (ref 0–2)
BILIRUB SERPL-MCNC: 0.3 MG/DL (ref 0.2–1)
BUN SERPL-MCNC: 12 MG/DL (ref 7–18)
CALCIUM SERPL-MCNC: 7.8 MG/DL (ref 8.5–10.1)
CHLORIDE SERPL-SCNC: 114 MEQ/L (ref 98–107)
CREAT SERPL-MCNC: 1.02 MG/DL (ref 0.6–1.3)
EOSINOPHIL # BLD: 0.1 TH/MM3 (ref 0–0.4)
EOSINOPHIL # BLD: 0.2 TH/MM3 (ref 0–0.4)
EOSINOPHIL NFR BLD: 0.5 % (ref 0–4)
EOSINOPHIL NFR BLD: 1.3 % (ref 0–4)
ERYTHROCYTE [DISTWIDTH] IN BLOOD BY AUTOMATED COUNT: 17.7 % (ref 11.6–17.2)
ERYTHROCYTE [DISTWIDTH] IN BLOOD BY AUTOMATED COUNT: 18.1 % (ref 11.6–17.2)
GFR SERPLBLD BASED ON 1.73 SQ M-ARVRAT: 63 ML/MIN (ref 89–?)
GLUCOSE SERPL-MCNC: 167 MG/DL (ref 74–106)
HCO3 BLD-SCNC: 23.7 MEQ/L (ref 21–32)
HCT VFR BLD CALC: 34.2 % (ref 39–51)
HCT VFR BLD CALC: 34.5 % (ref 39–51)
HGB BLD-MCNC: 11.7 GM/DL (ref 13–17)
HGB BLD-MCNC: 11.9 GM/DL (ref 13–17)
INR PPP: 1.1 RATIO
LYMPHOCYTES # BLD AUTO: 1.8 TH/MM3 (ref 1–4.8)
LYMPHOCYTES # BLD AUTO: 6.4 TH/MM3 (ref 1–4.8)
LYMPHOCYTES NFR BLD AUTO: 16.4 % (ref 9–44)
LYMPHOCYTES NFR BLD AUTO: 40 % (ref 9–44)
LYMPHOCYTES: 37 % (ref 9–44)
MCH RBC QN AUTO: 27.3 PG (ref 27–34)
MCH RBC QN AUTO: 27.9 PG (ref 27–34)
MCHC RBC AUTO-ENTMCNC: 34.1 % (ref 32–36)
MCHC RBC AUTO-ENTMCNC: 34.4 % (ref 32–36)
MCV RBC AUTO: 79.3 FL (ref 80–100)
MCV RBC AUTO: 81.8 FL (ref 80–100)
METAMYELOCYTES NFR BLD: 1 % (ref 0–1)
MONOCYTE #: 0.6 TH/MM3 (ref 0–0.9)
MONOCYTE #: 0.9 TH/MM3 (ref 0–0.9)
MONOCYTES NFR BLD: 5.1 % (ref 0–8)
MONOCYTES NFR BLD: 5.7 % (ref 0–8)
MONOCYTES: 3 % (ref 0–8)
NEUTROPHILS # BLD AUTO: 8.3 TH/MM3 (ref 1.8–7.7)
NEUTROPHILS # BLD AUTO: 8.6 TH/MM3 (ref 1.8–7.7)
NEUTROPHILS NFR BLD AUTO: 52 % (ref 16–70)
NEUTROPHILS NFR BLD AUTO: 77.8 % (ref 16–70)
NEUTS BAND # BLD MANUAL: 9.4 TH/MM3 (ref 1.8–7.7)
NEUTS SEG NFR BLD MANUAL: 58 % (ref 16–70)
PLATELET # BLD: 151 TH/MM3 (ref 150–450)
PLATELET # BLD: 236 TH/MM3 (ref 150–450)
PMV BLD AUTO: 7.5 FL (ref 7–11)
PMV BLD AUTO: 7.9 FL (ref 7–11)
PROT SERPL-MCNC: 3.9 GM/DL (ref 6.4–8.2)
PROTHROMBIN TIME: 11.2 SEC (ref 9.8–11.6)
RBC # BLD AUTO: 4.18 MIL/MM3 (ref 4.5–5.9)
RBC # BLD AUTO: 4.35 MIL/MM3 (ref 4.5–5.9)
SODIUM SERPL-SCNC: 148 MEQ/L (ref 136–145)
TROPONIN I SERPL-MCNC: 0.3 NG/ML (ref 0.02–0.05)
WBC # BLD AUTO: 11.1 TH/MM3 (ref 4–11)
WBC # BLD AUTO: 16 TH/MM3 (ref 4–11)

## 2018-03-08 PROCEDURE — 84132 ASSAY OF SERUM POTASSIUM: CPT

## 2018-03-08 PROCEDURE — 94770: CPT

## 2018-03-08 PROCEDURE — 73560 X-RAY EXAM OF KNEE 1 OR 2: CPT

## 2018-03-08 PROCEDURE — 70498 CT ANGIOGRAPHY NECK: CPT

## 2018-03-08 PROCEDURE — 84484 ASSAY OF TROPONIN QUANT: CPT

## 2018-03-08 PROCEDURE — 85610 PROTHROMBIN TIME: CPT

## 2018-03-08 PROCEDURE — 30233N1 TRANSFUSION OF NONAUTOLOGOUS RED BLOOD CELLS INTO PERIPHERAL VEIN, PERCUTANEOUS APPROACH: ICD-10-PCS

## 2018-03-08 PROCEDURE — 85730 THROMBOPLASTIN TIME PARTIAL: CPT

## 2018-03-08 PROCEDURE — 93005 ELECTROCARDIOGRAM TRACING: CPT

## 2018-03-08 PROCEDURE — A7521 TRACH/LARYN TUBE CUFFED: HCPCS

## 2018-03-08 PROCEDURE — 71045 X-RAY EXAM CHEST 1 VIEW: CPT

## 2018-03-08 PROCEDURE — P9016 RBC LEUKOCYTES REDUCED: HCPCS

## 2018-03-08 PROCEDURE — C9113 INJ PANTOPRAZOLE SODIUM, VIA: HCPCS

## 2018-03-08 PROCEDURE — 86927 PLASMA FRESH FROZEN: CPT

## 2018-03-08 PROCEDURE — 73551 X-RAY EXAM OF FEMUR 1: CPT

## 2018-03-08 PROCEDURE — L0150 CERV SEMI-RIG ADJ MOLDED CHN: HCPCS

## 2018-03-08 PROCEDURE — 85384 FIBRINOGEN ACTIVITY: CPT

## 2018-03-08 PROCEDURE — 84100 ASSAY OF PHOSPHORUS: CPT

## 2018-03-08 PROCEDURE — P9017 PLASMA 1 DONOR FRZ W/IN 8 HR: HCPCS

## 2018-03-08 PROCEDURE — 93306 TTE W/DOPPLER COMPLETE: CPT

## 2018-03-08 PROCEDURE — 05H633Z INSERTION OF INFUSION DEVICE INTO LEFT SUBCLAVIAN VEIN, PERCUTANEOUS APPROACH: ICD-10-PCS | Performed by: SURGERY

## 2018-03-08 PROCEDURE — 86920 COMPATIBILITY TEST SPIN: CPT

## 2018-03-08 PROCEDURE — 85025 COMPLETE CBC W/AUTO DIFF WBC: CPT

## 2018-03-08 PROCEDURE — 85018 HEMOGLOBIN: CPT

## 2018-03-08 PROCEDURE — 70450 CT HEAD/BRAIN W/O DYE: CPT

## 2018-03-08 PROCEDURE — 70486 CT MAXILLOFACIAL W/O DYE: CPT

## 2018-03-08 PROCEDURE — 74018 RADEX ABDOMEN 1 VIEW: CPT

## 2018-03-08 PROCEDURE — 83735 ASSAY OF MAGNESIUM: CPT

## 2018-03-08 PROCEDURE — 80053 COMPREHEN METABOLIC PANEL: CPT

## 2018-03-08 PROCEDURE — 73100 X-RAY EXAM OF WRIST: CPT

## 2018-03-08 PROCEDURE — 76000 FLUOROSCOPY <1 HR PHYS/QHP: CPT

## 2018-03-08 PROCEDURE — 71260 CT THORAX DX C+: CPT

## 2018-03-08 PROCEDURE — 84295 ASSAY OF SERUM SODIUM: CPT

## 2018-03-08 PROCEDURE — C1713 ANCHOR/SCREW BN/BN,TIS/BN: HCPCS

## 2018-03-08 PROCEDURE — 94664 DEMO&/EVAL PT USE INHALER: CPT

## 2018-03-08 PROCEDURE — 36430 TRANSFUSION BLD/BLD COMPNT: CPT

## 2018-03-08 PROCEDURE — 95819 EEG AWAKE AND ASLEEP: CPT

## 2018-03-08 PROCEDURE — 5A1955Z RESPIRATORY VENTILATION, GREATER THAN 96 CONSECUTIVE HOURS: ICD-10-PCS

## 2018-03-08 PROCEDURE — 83880 ASSAY OF NATRIURETIC PEPTIDE: CPT

## 2018-03-08 PROCEDURE — 73090 X-RAY EXAM OF FOREARM: CPT

## 2018-03-08 PROCEDURE — P9045 ALBUMIN (HUMAN), 5%, 250 ML: HCPCS

## 2018-03-08 PROCEDURE — 94002 VENT MGMT INPAT INIT DAY: CPT

## 2018-03-08 PROCEDURE — 90471 IMMUNIZATION ADMIN: CPT

## 2018-03-08 PROCEDURE — 72170 X-RAY EXAM OF PELVIS: CPT

## 2018-03-08 PROCEDURE — 72125 CT NECK SPINE W/O DYE: CPT

## 2018-03-08 PROCEDURE — 82805 BLOOD GASES W/O2 SATURATION: CPT

## 2018-03-08 PROCEDURE — L0120 CERV FLEX N/ADJ FOAM PRE OTS: HCPCS

## 2018-03-08 PROCEDURE — 70551 MRI BRAIN STEM W/O DYE: CPT

## 2018-03-08 PROCEDURE — 74177 CT ABD & PELVIS W/CONTRAST: CPT

## 2018-03-08 PROCEDURE — 85007 BL SMEAR W/DIFF WBC COUNT: CPT

## 2018-03-08 PROCEDURE — 86850 RBC ANTIBODY SCREEN: CPT

## 2018-03-08 PROCEDURE — 80048 BASIC METABOLIC PNL TOTAL CA: CPT

## 2018-03-08 PROCEDURE — 36556 INSERT NON-TUNNEL CV CATH: CPT

## 2018-03-08 PROCEDURE — 86900 BLOOD TYPING SEROLOGIC ABO: CPT

## 2018-03-08 PROCEDURE — 73110 X-RAY EXAM OF WRIST: CPT

## 2018-03-08 PROCEDURE — 0BH17EZ INSERTION OF ENDOTRACHEAL AIRWAY INTO TRACHEA, VIA NATURAL OR ARTIFICIAL OPENING: ICD-10-PCS

## 2018-03-08 PROCEDURE — 83605 ASSAY OF LACTIC ACID: CPT

## 2018-03-08 PROCEDURE — 85027 COMPLETE CBC AUTOMATED: CPT

## 2018-03-08 PROCEDURE — 76377 3D RENDER W/INTRP POSTPROCES: CPT

## 2018-03-08 PROCEDURE — 84155 ASSAY OF PROTEIN SERUM: CPT

## 2018-03-08 PROCEDURE — 76937 US GUIDE VASCULAR ACCESS: CPT

## 2018-03-08 PROCEDURE — 94640 AIRWAY INHALATION TREATMENT: CPT

## 2018-03-08 PROCEDURE — 86965 POOLING BLOOD PLATELETS: CPT

## 2018-03-08 PROCEDURE — 90715 TDAP VACCINE 7 YRS/> IM: CPT

## 2018-03-08 PROCEDURE — 83930 ASSAY OF BLOOD OSMOLALITY: CPT

## 2018-03-08 PROCEDURE — 61210 BURR HOLE IMPLT VENTR CATH: CPT

## 2018-03-08 PROCEDURE — 86901 BLOOD TYPING SEROLOGIC RH(D): CPT

## 2018-03-08 PROCEDURE — 94003 VENT MGMT INPAT SUBQ DAY: CPT

## 2018-03-08 PROCEDURE — 73060 X-RAY EXAM OF HUMERUS: CPT

## 2018-03-08 PROCEDURE — 04HY32Z INSERTION OF MONITORING DEVICE INTO LOWER ARTERY, PERCUTANEOUS APPROACH: ICD-10-PCS | Performed by: INTERNAL MEDICINE

## 2018-03-08 RX ADMIN — PHENYTOIN SODIUM SCH MLS/HR: 50 INJECTION INTRAMUSCULAR; INTRAVENOUS at 21:56

## 2018-03-08 RX ADMIN — PANTOPRAZOLE SODIUM SCH MG: 40 INJECTION, POWDER, FOR SOLUTION INTRAVENOUS at 22:24

## 2018-03-08 RX ADMIN — SODIUM CHLORIDE SCH MLS/HR: 3 INJECTION, SOLUTION INTRAVENOUS at 21:49

## 2018-03-08 RX ADMIN — DOCUSATE SODIUM SCH MG: 100 CAPSULE, LIQUID FILLED ORAL at 21:00

## 2018-03-08 RX ADMIN — TAZOBACTAM SODIUM AND PIPERACILLIN SODIUM SCH MLS/HR: 500; 4 INJECTION, SOLUTION INTRAVENOUS at 22:24

## 2018-03-08 RX ADMIN — PHENYLEPHRINE HYDROCHLORIDE PRN MLS/HR: 10 INJECTION INTRAVENOUS at 22:15

## 2018-03-08 RX ADMIN — SODIUM CHLORIDE PRN MLS/HR: 900 IRRIGANT IRRIGATION at 21:00

## 2018-03-08 RX ADMIN — PROPOFOL PRN MLS/HR: 10 INJECTION, EMULSION INTRAVENOUS at 21:00

## 2018-03-08 RX ADMIN — Medication PRN MLS/HR: at 22:27
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
## 2018-03-08 NOTE — RADRPT
EXAM DATE/TIME:  03/08/2018 19:39 

 

[facility] COMPARISON:     

No previous studies available for comparison.

 

                     

INDICATIONS :     

Right tibia/fibula.

                     

 

MEDICAL HISTORY :            

Unobtainable.   

 

SURGICAL HISTORY :        

Unobtainable.

 

ENCOUNTER:     

Initial                                        

 

ACUITY:     

1 day      

 

PAIN SCORE:     

Non-responsive.

 

LOCATION:     

Right  tibia/fibula

 

FINDINGS:     

There is a curve lucency through the central metaphysis of the proximal tibia with discontinuity of t
he cortex medially suggesting a central metaphyseal fracture.  There are also multiple comminuted fra
ctures of the distal femur with significant separation of the metaphyseal fragment.

 

CONCLUSION:     

Proximal metaphyseal tibial fracture with probable comminution.  Significantly displaced comminuted f
racture of the distal femur.

 

 

 

 Fredy Marquez MD on March 08, 2018 at 20:31           

Board Certified Radiologist.

 This report was verified electronically.
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
## 2018-03-08 NOTE — PD.PROCEDR
Procedure Note


Procedure


Left femoral arterial line placement, US guided





DESCRIPTION OF THE PROCEDURE


The patient was placed in supine, position.  The area was exposed and cleansed 

with ChloraPrep, times two.  Sterile drape was used to cover the patient, with 

the site exposed, under sterile conditions including cap, face mask, sterile 

gown, and sterile gloves. On single attempt, the introducer needle was inserted 

with negative pressure in syringe and arterial flash was obtained.  The guide 

wire was then advanced without any restriction and the needle was removed.  The 

dilator was used without any complications.  Using Seldinger technique the 

arterial catheter was advanced over the guide wire to a depth of 15 

centimeters.  The guide wire was removed. Good arterial wave form obtained.  

Antibiotic disc was placed around arterial line at puncture site, arterial line 

was secured to the skin with one interrupted 2.0 silk sutures. Blood loss was 

less than 2 ml











Vicki Quispe MD Mar 8, 2018 22:19
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
## 2018-03-09 VITALS — HEART RATE: 100 BPM

## 2018-03-09 VITALS — HEART RATE: 79 BPM

## 2018-03-09 VITALS
TEMPERATURE: 99.7 F | HEART RATE: 83 BPM | RESPIRATION RATE: 11 BRPM | OXYGEN SATURATION: 100 % | DIASTOLIC BLOOD PRESSURE: 69 MMHG | SYSTOLIC BLOOD PRESSURE: 138 MMHG

## 2018-03-09 VITALS
DIASTOLIC BLOOD PRESSURE: 68 MMHG | TEMPERATURE: 99.7 F | SYSTOLIC BLOOD PRESSURE: 130 MMHG | OXYGEN SATURATION: 100 % | RESPIRATION RATE: 18 BRPM | HEART RATE: 88 BPM

## 2018-03-09 VITALS — OXYGEN SATURATION: 95 %

## 2018-03-09 VITALS — OXYGEN SATURATION: 100 %

## 2018-03-09 VITALS
SYSTOLIC BLOOD PRESSURE: 104 MMHG | OXYGEN SATURATION: 100 % | RESPIRATION RATE: 18 BRPM | DIASTOLIC BLOOD PRESSURE: 52 MMHG | HEART RATE: 74 BPM | TEMPERATURE: 97.9 F

## 2018-03-09 VITALS — OXYGEN SATURATION: 100 % | HEART RATE: 73 BPM

## 2018-03-09 VITALS
OXYGEN SATURATION: 95 % | SYSTOLIC BLOOD PRESSURE: 111 MMHG | RESPIRATION RATE: 18 BRPM | DIASTOLIC BLOOD PRESSURE: 58 MMHG | TEMPERATURE: 100 F | HEART RATE: 85 BPM

## 2018-03-09 VITALS — HEART RATE: 78 BPM

## 2018-03-09 VITALS
SYSTOLIC BLOOD PRESSURE: 138 MMHG | OXYGEN SATURATION: 100 % | RESPIRATION RATE: 18 BRPM | TEMPERATURE: 99.7 F | DIASTOLIC BLOOD PRESSURE: 70 MMHG | HEART RATE: 80 BPM

## 2018-03-09 VITALS — HEART RATE: 87 BPM

## 2018-03-09 VITALS
DIASTOLIC BLOOD PRESSURE: 64 MMHG | OXYGEN SATURATION: 94 % | TEMPERATURE: 99.7 F | HEART RATE: 88 BPM | SYSTOLIC BLOOD PRESSURE: 123 MMHG | RESPIRATION RATE: 18 BRPM

## 2018-03-09 VITALS — HEART RATE: 105 BPM

## 2018-03-09 VITALS — HEART RATE: 94 BPM

## 2018-03-09 VITALS — OXYGEN SATURATION: 99 %

## 2018-03-09 VITALS — HEART RATE: 68 BPM

## 2018-03-09 LAB
BASOPHILS # BLD AUTO: 0 TH/MM3 (ref 0–0.2)
BASOPHILS NFR BLD: 0.1 % (ref 0–2)
BUN SERPL-MCNC: 14 MG/DL (ref 7–18)
CALCIUM SERPL-MCNC: 7.2 MG/DL (ref 8.5–10.1)
CALCIUM TP COR SERPL-MCNC: 8.7 MG/DL (ref 8.5–10.1)
CHLORIDE SERPL-SCNC: 118 MEQ/L (ref 98–107)
CREAT SERPL-MCNC: 1.26 MG/DL (ref 0.6–1.3)
EOSINOPHIL # BLD: 0 TH/MM3 (ref 0–0.4)
EOSINOPHIL NFR BLD: 0.5 % (ref 0–4)
ERYTHROCYTE [DISTWIDTH] IN BLOOD BY AUTOMATED COUNT: 17.7 % (ref 11.6–17.2)
FIBRINOGEN PPP-MCNC: 367 MG/DL (ref 227–377)
FIBRINOGEN PPP-MCNC: 89 MG/DL (ref 227–377)
GFR SERPLBLD BASED ON 1.73 SQ M-ARVRAT: 49 ML/MIN (ref 89–?)
GLUCOSE SERPL-MCNC: 145 MG/DL (ref 74–106)
HCO3 BLD-SCNC: 22.9 MEQ/L (ref 21–32)
HCT VFR BLD CALC: 24.9 % (ref 39–51)
HGB BLD-MCNC: 8.7 GM/DL (ref 13–17)
INR PPP: 1.1 RATIO
INR PPP: 1.4 RATIO
LYMPHOCYTES # BLD AUTO: 1 TH/MM3 (ref 1–4.8)
LYMPHOCYTES NFR BLD AUTO: 12.4 % (ref 9–44)
MAGNESIUM SERPL-MCNC: 1.5 MG/DL (ref 1.5–2.5)
MCH RBC QN AUTO: 28.3 PG (ref 27–34)
MCHC RBC AUTO-ENTMCNC: 34.8 % (ref 32–36)
MCV RBC AUTO: 81.4 FL (ref 80–100)
MONOCYTE #: 0.5 TH/MM3 (ref 0–0.9)
MONOCYTES NFR BLD: 6.5 % (ref 0–8)
NEUTROPHILS # BLD AUTO: 6.7 TH/MM3 (ref 1.8–7.7)
NEUTROPHILS NFR BLD AUTO: 80.5 % (ref 16–70)
PHOSPHATE SERPL-MCNC: 1.1 MG/DL (ref 2.5–4.9)
PLATELET # BLD: 115 TH/MM3 (ref 150–450)
PMV BLD AUTO: 7.3 FL (ref 7–11)
PROT SERPL-MCNC: 4.5 GM/DL (ref 6.4–8.2)
PROTHROMBIN TIME: 11.4 SEC (ref 9.8–11.6)
PROTHROMBIN TIME: 14 SEC (ref 9.8–11.6)
RBC # BLD AUTO: 3.06 MIL/MM3 (ref 4.5–5.9)
SODIUM SERPL-SCNC: 152 MEQ/L (ref 136–145)
WBC # BLD AUTO: 8.4 TH/MM3 (ref 4–11)

## 2018-03-09 PROCEDURE — 0KBQ0ZZ EXCISION OF RIGHT UPPER LEG MUSCLE, OPEN APPROACH: ICD-10-PCS | Performed by: ORTHOPAEDIC SURGERY

## 2018-03-09 PROCEDURE — 30233M1 TRANSFUSION OF NONAUTOLOGOUS PLASMA CRYOPRECIPITATE INTO PERIPHERAL VEIN, PERCUTANEOUS APPROACH: ICD-10-PCS | Performed by: INTERNAL MEDICINE

## 2018-03-09 PROCEDURE — 4A103BD MONITORING OF INTRACRANIAL PRESSURE, PERCUTANEOUS APPROACH: ICD-10-PCS | Performed by: NEUROLOGICAL SURGERY

## 2018-03-09 PROCEDURE — 0QSG35Z REPOSITION RIGHT TIBIA WITH EXTERNAL FIXATION DEVICE, PERCUTANEOUS APPROACH: ICD-10-PCS | Performed by: ORTHOPAEDIC SURGERY

## 2018-03-09 PROCEDURE — 00H032Z INSERTION OF MONITORING DEVICE INTO BRAIN, PERCUTANEOUS APPROACH: ICD-10-PCS | Performed by: ORTHOPAEDIC SURGERY

## 2018-03-09 PROCEDURE — 0QS835Z REPOSITION RIGHT FEMORAL SHAFT WITH EXTERNAL FIXATION DEVICE, PERCUTANEOUS APPROACH: ICD-10-PCS | Performed by: ORTHOPAEDIC SURGERY

## 2018-03-09 PROCEDURE — 30233K1 TRANSFUSION OF NONAUTOLOGOUS FROZEN PLASMA INTO PERIPHERAL VEIN, PERCUTANEOUS APPROACH: ICD-10-PCS | Performed by: INTERNAL MEDICINE

## 2018-03-09 RX ADMIN — CHLORHEXIDINE GLUCONATE 0.12% ORAL RINSE SCH ML: 1.2 LIQUID ORAL at 20:00

## 2018-03-09 RX ADMIN — PHENYTOIN SODIUM SCH MLS/HR: 50 INJECTION INTRAMUSCULAR; INTRAVENOUS at 17:08

## 2018-03-09 RX ADMIN — POTASSIUM CHLORIDE PRN MLS/HR: 400 INJECTION, SOLUTION INTRAVENOUS at 11:49

## 2018-03-09 RX ADMIN — LEVETIRACETAM SCH MLS/HR: 100 INJECTION, SOLUTION, CONCENTRATE INTRAVENOUS at 21:00

## 2018-03-09 RX ADMIN — SODIUM CHLORIDE SCH MLS/HR: 3 INJECTION, SOLUTION INTRAVENOUS at 11:00

## 2018-03-09 RX ADMIN — LEVETIRACETAM SCH MLS/HR: 100 INJECTION, SOLUTION, CONCENTRATE INTRAVENOUS at 08:53

## 2018-03-09 RX ADMIN — PYRIDOXINE HYDROCHLORIDE SCH MLS/HR: 100 INJECTION, SOLUTION INTRAMUSCULAR; INTRAVENOUS at 23:00

## 2018-03-09 RX ADMIN — CHLORHEXIDINE GLUCONATE 0.12% ORAL RINSE SCH ML: 1.2 LIQUID ORAL at 08:00

## 2018-03-09 RX ADMIN — POTASSIUM CHLORIDE PRN MLS/HR: 400 INJECTION, SOLUTION INTRAVENOUS at 16:00

## 2018-03-09 RX ADMIN — SODIUM CHLORIDE SCH MLS/HR: 3 INJECTION, SOLUTION INTRAVENOUS at 21:19

## 2018-03-09 RX ADMIN — PHENYTOIN SODIUM SCH MLS/HR: 50 INJECTION INTRAMUSCULAR; INTRAVENOUS at 23:48

## 2018-03-09 RX ADMIN — SODIUM CHLORIDE SCH MLS/HR: 900 INJECTION INTRAVENOUS at 21:27

## 2018-03-09 RX ADMIN — SODIUM CHLORIDE SCH MLS/HR: 900 INJECTION INTRAVENOUS at 09:05

## 2018-03-09 RX ADMIN — CEFAZOLIN SODIUM SCH MLS/HR: 2 SOLUTION INTRAVENOUS at 21:01

## 2018-03-09 RX ADMIN — Medication PRN MLS/HR: at 11:00

## 2018-03-09 RX ADMIN — Medication PRN MLS/HR: at 12:58

## 2018-03-09 RX ADMIN — MAGNESIUM HYDROXIDE SCH ML: 400 SUSPENSION ORAL at 21:00

## 2018-03-09 RX ADMIN — MAGNESIUM HYDROXIDE SCH ML: 400 SUSPENSION ORAL at 09:00

## 2018-03-09 RX ADMIN — IPRATROPIUM BROMIDE AND ALBUTEROL SULFATE SCH AMPULE: .5; 3 SOLUTION RESPIRATORY (INHALATION) at 12:41

## 2018-03-09 RX ADMIN — BACITRACIN SCH APPLIC: 500 OINTMENT TOPICAL at 21:00

## 2018-03-09 RX ADMIN — PHENYLEPHRINE HYDROCHLORIDE PRN MLS/HR: 10 INJECTION INTRAVENOUS at 10:24

## 2018-03-09 RX ADMIN — SODIUM CHLORIDE PRN MLS/HR: 900 IRRIGANT IRRIGATION at 07:36

## 2018-03-09 RX ADMIN — IPRATROPIUM BROMIDE AND ALBUTEROL SULFATE SCH AMPULE: .5; 3 SOLUTION RESPIRATORY (INHALATION) at 08:46

## 2018-03-09 RX ADMIN — IPRATROPIUM BROMIDE AND ALBUTEROL SULFATE SCH AMPULE: .5; 3 SOLUTION RESPIRATORY (INHALATION) at 21:01

## 2018-03-09 RX ADMIN — PHENYTOIN SODIUM SCH MLS/HR: 50 INJECTION INTRAMUSCULAR; INTRAVENOUS at 09:57

## 2018-03-09 RX ADMIN — DOCUSATE SODIUM SCH MG: 100 CAPSULE, LIQUID FILLED ORAL at 09:00

## 2018-03-09 RX ADMIN — PHENYLEPHRINE HYDROCHLORIDE PRN MLS/HR: 10 INJECTION INTRAVENOUS at 04:46

## 2018-03-09 RX ADMIN — PANTOPRAZOLE SODIUM SCH MG: 40 INJECTION, POWDER, FOR SOLUTION INTRAVENOUS at 20:59

## 2018-03-09 RX ADMIN — PROPOFOL PRN MLS/HR: 10 INJECTION, EMULSION INTRAVENOUS at 07:37

## 2018-03-09 RX ADMIN — PHENYLEPHRINE HYDROCHLORIDE PRN MLS/HR: 10 INJECTION INTRAVENOUS at 07:39

## 2018-03-09 RX ADMIN — IPRATROPIUM BROMIDE AND ALBUTEROL SULFATE SCH AMPULE: .5; 3 SOLUTION RESPIRATORY (INHALATION) at 23:44

## 2018-03-09 RX ADMIN — PHENYLEPHRINE HYDROCHLORIDE PRN MLS/HR: 10 INJECTION INTRAVENOUS at 02:32

## 2018-03-09 RX ADMIN — PROPOFOL PRN MLS/HR: 10 INJECTION, EMULSION INTRAVENOUS at 19:39

## 2018-03-09 RX ADMIN — TAZOBACTAM SODIUM AND PIPERACILLIN SODIUM SCH MLS/HR: 500; 4 INJECTION, SOLUTION INTRAVENOUS at 04:26

## 2018-03-09 RX ADMIN — CHLORHEXIDINE GLUCONATE SCH PACK: 500 CLOTH TOPICAL at 04:00

## 2018-03-09 RX ADMIN — PROPOFOL PRN MLS/HR: 10 INJECTION, EMULSION INTRAVENOUS at 09:26

## 2018-03-09 RX ADMIN — DOCUSATE SODIUM SCH MG: 100 CAPSULE, LIQUID FILLED ORAL at 21:00

## 2018-03-09 RX ADMIN — IPRATROPIUM BROMIDE AND ALBUTEROL SULFATE SCH AMPULE: .5; 3 SOLUTION RESPIRATORY (INHALATION) at 00:00

## 2018-03-09 RX ADMIN — IPRATROPIUM BROMIDE AND ALBUTEROL SULFATE SCH AMPULE: .5; 3 SOLUTION RESPIRATORY (INHALATION) at 03:42

## 2018-03-09 RX ADMIN — PHENYTOIN SODIUM SCH MLS/HR: 50 INJECTION INTRAMUSCULAR; INTRAVENOUS at 03:48

## 2018-03-09 RX ADMIN — BACITRACIN SCH APPLIC: 500 OINTMENT TOPICAL at 09:00
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
## 2018-03-09 NOTE — RADRPT
EXAM DATE/TIME:  03/09/2018 18:18 

 

[facility] COMPARISON:     

CT BRAIN W/O CONTRAST, March 08, 2018, 19:45.

 

 

INDICATIONS :     

Trauma, facial fractures, intracranial hemorrhage.

                      

 

RADIATION DOSE:     

62.19 CTDIvol (mGy) ; Tabletop CT Head

 

 

 

MEDICAL HISTORY :     

Non-responsive.  

 

SURGICAL HISTORY :      

Non-responsive. 

 

ENCOUNTER:      

Subsequent

 

ACUITY:      

1 day

 

PAIN SCALE:      

Non-responsive

 

LOCATION:        

cranial 

 

TECHNIQUE:     

Multiple contiguous axial images were obtained of the head.  Using automated exposure control and adj
ustment of the mA and/or kV according to patient size, radiation dose was kept as low as reasonably a
chievable to obtain optimal diagnostic quality images.   DICOM format image data is available electro
nically for review and comparison.  

 

FINDINGS:     

Comparison 2 CT 3/8/18.  There's been a significant increase the amount of right scalp swelling, exte
nding all way to the high convexity and measuring up to 1.9 cm in thickness.  No skull fracture seen.
  Intracranial monitoring bolt is present the high convexity right parietal region.

 

Evidence of subarachnoid hemorrhage in the low convexity and high convexity region and in the parasag
ittal parietal region.  Extra-axial blood collection in the posterior right occipital region is small
er than on prior, now measuring 6 mm.  Intraparenchymal hemorrhage in the highest convexity parietal-
occipital region.  There is some effacement of the sulci throughout the right hemisphere, similar to 
prior.  No asymmetry of the lateral ventricles and no evidence of midline shift.  Mild effacement of 
the occipital horn on the right side similar to prior.

 

In the left hemisphere, the only finding seen is some subarachnoid hemorrhage left parasagittal highe
st convexity occipital region, which is a new finding.  The the posterior fossa structures remain int
act.  The 4th ventricles are midline.

 

Extensive bilateral facial bone fractures are partially included in the field-of-view, similar to taj
or.

 

CONCLUSION:     

The extra-axial fluid collection at the posterior highest convexity occipital region is less prominen
t than on prior examination and has features on today's examination suggesting subpleural blood.  Per
sistent subarachnoid hemorrhage in the right hemisphere and new small area of subarachnoid hemorrhage
 posterior left high parietal region.  Degree of swelling in the right hemisphere is similar to prior
.  There has been a significant increase the amount of right scalp swelling, now extending only to th
e high convexities.

 

 

 

 Fredy Marquez MD on March 09, 2018 at 18:58           

Board Certified Radiologist.

 This report was verified electronically.
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
## 2018-03-09 NOTE — RADRPT
EXAM DATE/TIME:  03/09/2018 16:13 

 

[facility] COMPARISON:     

No previous studies available for comparison.

 

                     

INDICATIONS :     

Ex fix rt knee/distal femur.

                     

 

MEDICAL HISTORY :     

None.          

 

SURGICAL HISTORY :     

None.   

 

ENCOUNTER:     

Subsequent                                        

 

ACUITY:     

1 day      

 

PAIN SCORE:     

Non-responsive.

 

LOCATION:     

Right  Distal femur

 

FINDINGS:     

4 magnified C-arm spot views are centered over the knee joint and labeled right. These images reveal 
a highly comminuted complex fracture involving the distal femur with involvement of the articular aleksandr
face of the lateral femoral condyle. There is a fracture seen involving the proximal tibial metaphysi
s with extension to the articular surface is suspected.

 

CONCLUSION:     

Limited images as detailed above.

 

 

 

 Fredy Sadler Jr., MD on March 09, 2018 at 17:14           

Board Certified Radiologist.

 This report was verified electronically.
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
## 2018-03-09 NOTE — PD.ORT.PN
Subjective


Subjective Remarks


s/p MCA


right open distal femur


right tibial plateau


right BBFA


left humerus





Objective


Vitals





Vital Signs








  Date Time  Temp Pulse Resp B/P (MAP) Pulse Ox O2 Delivery O2 Flow Rate FiO2


 


3/9/18 08:56     100   


 


3/9/18 08:47     100   60


 


3/9/18 07:39  86  114/62    


 


3/9/18 07:36  87  116/62    


 


3/9/18 06:00  87      


 


3/9/18 04:46  89  106/54    


 


3/9/18 04:00  105      


 


3/9/18 03:37     99   60


 


3/9/18 02:32  115  140/73    


 


3/9/18 02:00  100      


 


3/9/18 00:23     95   60


 


3/9/18 00:14     100   100


 


3/9/18 00:00  94      


 


3/8/18 22:15  112  131/88    


 


3/8/18 22:00  115      


 


3/8/18 21:48     100   100


 


3/8/18 21:00  110  114/74    


 


3/8/18 21:00     100   100


 


3/8/18 20:20     100   100


 


3/8/18 19:39     100  15.00 100














I/O      


 


 3/8/18 3/8/18 3/8/18 3/9/18 3/9/18 3/9/18





 07:00 15:00 23:00 07:00 15:00 23:00


 


Intake Total   1050 ml 6546 ml  


 


Balance   1050 ml 6546 ml  


 


      


 


Intake IV Total    5100 ml  


 


Packed Cells   800 ml   


 


Cryoprecipitate    946 ml  


 


Blood Product IV Normal Saline Flush   250 ml 500 ml  








Result Diagram:  


3/9/18 0435                                                                    

            3/9/18 0435





Other Results





Laboratory Tests








Test


  3/8/18


19:45 3/8/18


22:45 3/9/18


04:35


 


Prothromb Time International


Ratio 1.1 RATIO 


  1.4 RATIO 


  1.1 RATIO 


 


 


Prothrombin Time


  11.2 SEC


(9.8-11.6) 14.0 SEC


(9.8-11.6) 11.4 SEC


(9.8-11.6)








Imaging





Last 24 hours Impressions








Chest X-Ray 3/9/18 0000 Signed





Impressions: 





 Service Date/Time:  Friday, March 9, 2018 02:33 - CONCLUSION:  Modest 

worsening 





 left base consolidation and with a probable tiny left pleural effusion 





 developing.     Jose Enrique Chiu MD 


 


Pelvis X-Ray 3/8/18 1941 Signed





Impressions: 





 Service Date/Time:  Thursday, March 8, 2018 19:39 - CONCLUSION:  No gross 





 abnormality seen on this limited exam.     Fredy Marquez MD 


 


Maxillofacial CT 3/8/18 1941 Signed





Impressions: 





 Service Date/Time:  Thursday, March 8, 2018 19:45 - CONCLUSION:  1. Bilateral 





 LeFort III facial fractures with significant impaction.. 2. Intracranial 





 displacement of the right superior orbital fracture and comminuted fracture of 





 the anterior paolo carlyn.     Fredy Marquez MD 


 


Head CT 3/8/18 1941 Signed





Impressions: 





 Service Date/Time:  Thursday, March 8, 2018 19:45 - CONCLUSION:  1. 

Intracranial 





 hemorrhage predominately on the right including subarachnoid (high convexity), 





 intraparenchymal (right occipital), subdural (right temporal) and epidural (

high 





 convexity right parietal). 2.    LeFort III facial bone fractures with crush 





 injury of the maxillary and ethmoid regions.     Fredy Marquez MD 


 


Chest X-Ray 3/8/18 1941 Signed





Impressions: 





 Service Date/Time:  Thursday, March 8, 2018 19:39 - CONCLUSION:  1. ET tube in 





 good position. 2. Bilateral rib fractures, nondisplaced and multilevel on the 





 right and with a displaced posterior 3rd rib fragment.     Fredy Marquez MD 


 


Chest CT 3/8/18 1941 Signed





Impressions: 





 Service Date/Time:  Thursday, March 8, 2018 20:17 - CONCLUSION:  1. Hairline 





 fracture the superior endplate of T7 with concentric paraspinal hematoma 





 extending 7 cm superior/inferior. 2. Multiple nondisplaced fractures of the 





 posterolateral 7th and 10th ribs. 3. Comminuted fracture of the body of the 

left 





 scapula. 4. Hiatus hernia containing the gastric fundus. 5. Probable contusion 





 in the posterior segment right upper lobe and posterolateral left lower chest.

   





   Fredy Marquez MD 


 


Cervical Spine CT 3/8/18 1941 Signed





Impressions: 





 Service Date/Time:  Thursday, March 8, 2018 19:45 - CONCLUSION:  No evidence 

of 





 acute fracture or spondylolisthesis.     Fredy Marquez MD 


 


Abdomen/Pelvis CT 3/8/18 1941 Signed





Impressions: 





 Service Date/Time:  Thursday, March 8, 2018 20:17 - CONCLUSION:  1. Acute left 





 rib fractures and left transverse process fractures. 2. Hiatus hernia 

containing 





 the gastric fundus. 3. The solid and hollow organs of the abdomen/pelvis 

appear 





 grossly intact.     Fredy Marquez MD 








Objective Remarks


RLE: blood soaked dressing on anterior thigh. long leg splint intact. dressing 

removed and wound visualized.  approx 8x4 CM wound on anterior thigh. debris 

present. muscle visible. 


RUE: dressings clean and dry. intact


LUE: dressings clean and dry. intact.





Assessment & Plan


Assessment and Plan


1) Right Open Distal Femur Fx


2) Right Tibial Plateau Fx


3) Right Radius/Ulna Shaft Fxs


4) Left Distal 1/3 Humerus Fx





-patient needs I&D and application of exfix to right leg today. He is currently 

unstable for surgery. Will re-evaluate later today for hopeful stabilization of 

the leg


-will wait til patient is more stable to proceed with fixation of bilateral 

arms.











Galindo Segura/First Assist PA Mar 9, 2018 09:14

## 2018-03-09 NOTE — PD.ORT.PN
Subjective


Subjective Remarks


Transfer back to intensive care unit in stable condition





Objective


Vitals





Vital Signs








  Date Time  Temp Pulse Resp B/P (MAP) Pulse Ox O2 Delivery O2 Flow Rate FiO2


 


3/9/18 14:00  79      


 


3/9/18 12:44     100   60


 


3/9/18 12:44     100   60


 


3/9/18 12:00 100.0 86 18 111/58 (75) 95   


 


3/9/18 12:00  85      


 


3/9/18 11:00 99.7 88 18 130/68 (88) 100   


 


3/9/18 10:35 99.7 88 18 123/64 94   


 


3/9/18 10:24  81  125/65    


 


3/9/18 10:00  78      


 


3/9/18 09:45 99.7 80 18 138/70 100   


 


3/9/18 09:45 99.7 78 18 139/70 100   


 


3/9/18 09:20 99.7 79 18 138/70 100   


 


3/9/18 09:20 99.7 83 11 138/70 100   


 


3/9/18 09:20 99.7 78 18 138/69 100   


 


3/9/18 09:05  96  117/57    


 


3/9/18 08:56     100   


 


3/9/18 08:47     100   60


 


3/9/18 08:00  78      


 


3/9/18 07:39  86  114/62    


 


3/9/18 07:36  87  116/62    


 


3/9/18 07:00     100 Mechanical Ventilator  60


 


3/9/18 06:00  87      


 


3/9/18 04:46  89  106/54    


 


3/9/18 04:00  105      


 


3/9/18 03:37     99   60


 


3/9/18 02:32  115  140/73    


 


3/9/18 02:00  100      


 


3/9/18 00:23     95   60


 


3/9/18 00:14     100   100


 


3/9/18 00:00  94      


 


3/8/18 22:15  112  131/88    


 


3/8/18 22:00  115      


 


3/8/18 21:48     100   100


 


3/8/18 21:00  110  114/74    


 


3/8/18 21:00     100   100


 


3/8/18 20:20     100   100


 


3/8/18 19:39     100  15.00 100














I/O      


 


 3/8/18 3/8/18 3/8/18 3/9/18 3/9/18 3/9/18





 07:00 15:00 23:00 07:00 15:00 23:00


 


Intake Total   1050 ml 6546 ml 1341 ml 750 ml


 


Output Total      950 ml


 


Balance   1050 ml 6546 ml 1341 ml -200 ml


 


      


 


Intake IV Total    5100 ml  


 


Packed Cells   800 ml  400 ml 


 


FFP     941 ml 


 


Cryoprecipitate    946 ml  


 


Blood Product IV Normal Saline Flush   250 ml 500 ml  


 


Other      750 ml


 


Output Estimated Blood Loss      200 ml


 


Other      750 ml








Result Diagram:  


3/9/18 0435                                                                    

            3/9/18 1225





Other Results





Laboratory Tests








Test


  3/8/18


19:45 3/8/18


22:45 3/9/18


04:35


 


Prothromb Time International


Ratio 1.1 RATIO 


  1.4 RATIO 


  1.1 RATIO 


 


 


Prothrombin Time


  11.2 SEC


(9.8-11.6) 14.0 SEC


(9.8-11.6) 11.4 SEC


(9.8-11.6)








Imaging





Last 24 hours Impressions








Chest X-Ray 3/9/18 0000 Signed





Impressions: 





 Service Date/Time:  Friday, March 9, 2018 02:33 - CONCLUSION:  Modest 

worsening 





 left base consolidation and with a probable tiny left pleural effusion 





 developing.     Jose Enrique Chiu MD 


 


Pelvis X-Ray 3/8/18 1941 Signed





Impressions: 





 Service Date/Time:  Thursday, March 8, 2018 19:39 - CONCLUSION:  No gross 





 abnormality seen on this limited exam.     Fredy Marquez MD 


 


Maxillofacial CT 3/8/18 1941 Signed





Impressions: 





 Service Date/Time:  Thursday, March 8, 2018 19:45 - CONCLUSION:  1. Bilateral 





 LeFort III facial fractures with significant impaction.. 2. Intracranial 





 displacement of the right superior orbital fracture and comminuted fracture of 





 the anterior paolo carlyn.     Fredy Marquez MD 


 


Head CT 3/8/18 1941 Signed





Impressions: 





 Service Date/Time:  Thursday, March 8, 2018 19:45 - CONCLUSION:  1. 

Intracranial 





 hemorrhage predominately on the right including subarachnoid (high convexity), 





 intraparenchymal (right occipital), subdural (right temporal) and epidural (

high 





 convexity right parietal). 2.    LeFort III facial bone fractures with crush 





 injury of the maxillary and ethmoid regions.     Fredy Marquez MD 


 


Chest X-Ray 3/8/18 1941 Signed





Impressions: 





 Service Date/Time:  Thursday, March 8, 2018 19:39 - CONCLUSION:  1. ET tube in 





 good position. 2. Bilateral rib fractures, nondisplaced and multilevel on the 





 right and with a displaced posterior 3rd rib fragment.     Fredy Marquez MD 


 


Chest CT 3/8/18 1941 Signed





Impressions: 





 Service Date/Time:  Thursday, March 8, 2018 20:17 - CONCLUSION:  1. Hairline 





 fracture the superior endplate of T7 with concentric paraspinal hematoma 





 extending 7 cm superior/inferior. 2. Multiple nondisplaced fractures of the 





 posterolateral 7th and 10th ribs. 3. Comminuted fracture of the body of the 

left 





 scapula. 4. Hiatus hernia containing the gastric fundus. 5. Probable contusion 





 in the posterior segment right upper lobe and posterolateral left lower chest.

   





   Fredy Marquez MD 


 


Cervical Spine CT 3/8/18 1941 Signed





Impressions: 





 Service Date/Time:  Thursday, March 8, 2018 19:45 - CONCLUSION:  No evidence 

of 





 acute fracture or spondylolisthesis.     Fredy Marquez MD 


 


Abdomen/Pelvis CT 3/8/18 1941 Signed





Impressions: 





 Service Date/Time:  Thursday, March 8, 2018 20:17 - CONCLUSION:  1. Acute left 





 rib fractures and left transverse process fractures. 2. Hiatus hernia 

containing 





 the gastric fundus. 3. The solid and hollow organs of the abdomen/pelvis 

appear 





 grossly intact.     Fredy Marquez MD 








Objective Remarks


RLE: External fixator in place with clean dry dressings. Intact distal pulses 

and good capillary refills


RUE: dressings clean and dry. intact


LUE: dressings clean and dry. intact.





Assessment & Plan


Assessment and Plan


1) Right Open Distal Femur Fx and Right Tibial Plateau Fx status post external 

fixation, irrigation debridement and traumatic wound closure POD 0


3) Right Radius/Ulna Shaft Fxs


4) Left Distal 1/3 Humerus Fx





Continue critical-care. Splints taken down and reapplied bilateral upper 

extremities. No open wounds.


Maintain external fixation with pin care twice a day. Definitive fixation of 

the right distal femur, plateau and bilateral upper extremities will occur once 

patient is more stable.


Maintain splints on bilateral upper extremities











Jamel Elizabeth Jr. Mar 9, 2018 17:16
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
## 2018-03-10 VITALS
RESPIRATION RATE: 16 BRPM | TEMPERATURE: 98.8 F | OXYGEN SATURATION: 100 % | DIASTOLIC BLOOD PRESSURE: 70 MMHG | SYSTOLIC BLOOD PRESSURE: 135 MMHG | HEART RATE: 77 BPM

## 2018-03-10 VITALS
OXYGEN SATURATION: 100 % | SYSTOLIC BLOOD PRESSURE: 124 MMHG | RESPIRATION RATE: 18 BRPM | HEART RATE: 62 BPM | TEMPERATURE: 98.6 F | DIASTOLIC BLOOD PRESSURE: 68 MMHG

## 2018-03-10 VITALS
HEART RATE: 80 BPM | OXYGEN SATURATION: 100 % | DIASTOLIC BLOOD PRESSURE: 67 MMHG | SYSTOLIC BLOOD PRESSURE: 127 MMHG | RESPIRATION RATE: 16 BRPM | TEMPERATURE: 98.8 F

## 2018-03-10 VITALS
SYSTOLIC BLOOD PRESSURE: 127 MMHG | DIASTOLIC BLOOD PRESSURE: 68 MMHG | TEMPERATURE: 98.8 F | RESPIRATION RATE: 16 BRPM | HEART RATE: 83 BPM | OXYGEN SATURATION: 100 %

## 2018-03-10 VITALS
DIASTOLIC BLOOD PRESSURE: 64 MMHG | OXYGEN SATURATION: 100 % | HEART RATE: 74 BPM | SYSTOLIC BLOOD PRESSURE: 124 MMHG | TEMPERATURE: 98.6 F | RESPIRATION RATE: 16 BRPM

## 2018-03-10 VITALS
RESPIRATION RATE: 18 BRPM | SYSTOLIC BLOOD PRESSURE: 127 MMHG | OXYGEN SATURATION: 100 % | DIASTOLIC BLOOD PRESSURE: 63 MMHG | TEMPERATURE: 98.6 F | HEART RATE: 64 BPM

## 2018-03-10 VITALS
SYSTOLIC BLOOD PRESSURE: 132 MMHG | DIASTOLIC BLOOD PRESSURE: 74 MMHG | HEART RATE: 75 BPM | TEMPERATURE: 98.6 F | RESPIRATION RATE: 16 BRPM | OXYGEN SATURATION: 100 %

## 2018-03-10 VITALS
DIASTOLIC BLOOD PRESSURE: 68 MMHG | TEMPERATURE: 98.6 F | SYSTOLIC BLOOD PRESSURE: 130 MMHG | RESPIRATION RATE: 16 BRPM | HEART RATE: 71 BPM | OXYGEN SATURATION: 100 %

## 2018-03-10 VITALS — HEART RATE: 74 BPM | SYSTOLIC BLOOD PRESSURE: 122 MMHG | DIASTOLIC BLOOD PRESSURE: 65 MMHG

## 2018-03-10 VITALS
RESPIRATION RATE: 18 BRPM | DIASTOLIC BLOOD PRESSURE: 62 MMHG | TEMPERATURE: 98.6 F | OXYGEN SATURATION: 100 % | HEART RATE: 84 BPM | SYSTOLIC BLOOD PRESSURE: 116 MMHG

## 2018-03-10 VITALS
SYSTOLIC BLOOD PRESSURE: 118 MMHG | RESPIRATION RATE: 16 BRPM | HEART RATE: 80 BPM | DIASTOLIC BLOOD PRESSURE: 62 MMHG | TEMPERATURE: 98.6 F | OXYGEN SATURATION: 98 %

## 2018-03-10 VITALS — OXYGEN SATURATION: 100 %

## 2018-03-10 VITALS — HEART RATE: 80 BPM

## 2018-03-10 VITALS — HEART RATE: 66 BPM

## 2018-03-10 VITALS
HEART RATE: 77 BPM | DIASTOLIC BLOOD PRESSURE: 62 MMHG | TEMPERATURE: 98.6 F | RESPIRATION RATE: 16 BRPM | SYSTOLIC BLOOD PRESSURE: 118 MMHG | OXYGEN SATURATION: 98 %

## 2018-03-10 VITALS
DIASTOLIC BLOOD PRESSURE: 62 MMHG | HEART RATE: 65 BPM | RESPIRATION RATE: 18 BRPM | SYSTOLIC BLOOD PRESSURE: 128 MMHG | OXYGEN SATURATION: 100 % | TEMPERATURE: 98.6 F

## 2018-03-10 VITALS — HEART RATE: 71 BPM

## 2018-03-10 VITALS — HEART RATE: 77 BPM

## 2018-03-10 VITALS — HEART RATE: 72 BPM

## 2018-03-10 LAB
ACANTHOCYTES BLD QL SMEAR: (no result)
ALBUMIN SERPL-MCNC: 2.3 GM/DL (ref 3.4–5)
ALP SERPL-CCNC: 49 U/L (ref 45–117)
ALT SERPL-CCNC: 24 U/L (ref 12–78)
AST SERPL-CCNC: 45 U/L (ref 15–37)
BASO STIPL BLD QL SMEAR: (no result)
BASOPHILS # BLD AUTO: 0 TH/MM3 (ref 0–0.2)
BASOPHILS NFR BLD: 0.1 % (ref 0–2)
BILIRUB SERPL-MCNC: 0.4 MG/DL (ref 0.2–1)
BUN SERPL-MCNC: 16 MG/DL (ref 7–18)
CALCIUM SERPL-MCNC: 6.7 MG/DL (ref 8.5–10.1)
CALCIUM TP COR SERPL-MCNC: 7.7 MG/DL (ref 8.5–10.1)
CHLORIDE SERPL-SCNC: 124 MEQ/L (ref 98–107)
CREAT SERPL-MCNC: 1.04 MG/DL (ref 0.6–1.3)
EOSINOPHIL # BLD: 0 TH/MM3 (ref 0–0.4)
EOSINOPHIL NFR BLD: 0.1 % (ref 0–4)
ERYTHROCYTE [DISTWIDTH] IN BLOOD BY AUTOMATED COUNT: 18.6 % (ref 11.6–17.2)
GFR SERPLBLD BASED ON 1.73 SQ M-ARVRAT: 71 ML/MIN (ref 89–?)
GLUCOSE SERPL-MCNC: 167 MG/DL (ref 74–106)
HCO3 BLD-SCNC: 22.5 MEQ/L (ref 21–32)
HCT VFR BLD CALC: 22.2 % (ref 39–51)
HGB BLD-MCNC: 7.8 GM/DL (ref 13–17)
LYMPHOCYTES # BLD AUTO: 1.3 TH/MM3 (ref 1–4.8)
LYMPHOCYTES NFR BLD AUTO: 13.7 % (ref 9–44)
LYMPHOCYTES: 21 % (ref 9–44)
MCH RBC QN AUTO: 28 PG (ref 27–34)
MCHC RBC AUTO-ENTMCNC: 35.2 % (ref 32–36)
MCV RBC AUTO: 79.6 FL (ref 80–100)
MONOCYTE #: 1 TH/MM3 (ref 0–0.9)
MONOCYTES NFR BLD: 10.6 % (ref 0–8)
MONOCYTES: 2 % (ref 0–8)
MYELOCYTES NFR BLD: 1 % (ref 0–0)
NEUTROPHILS # BLD AUTO: 7 TH/MM3 (ref 1.8–7.7)
NEUTROPHILS NFR BLD AUTO: 75.5 % (ref 16–70)
NEUTS BAND # BLD MANUAL: 7.2 TH/MM3 (ref 1.8–7.7)
NEUTS BAND NFR BLD: 21 % (ref 0–6)
NEUTS SEG NFR BLD MANUAL: 55 % (ref 16–70)
PHOSPHATE SERPL-MCNC: 2.9 MG/DL (ref 2.5–4.9)
PLATELET # BLD: 71 TH/MM3 (ref 150–450)
PMV BLD AUTO: 8.2 FL (ref 7–11)
PROT SERPL-MCNC: 5.1 GM/DL (ref 6.4–8.2)
RBC # BLD AUTO: 2.78 MIL/MM3 (ref 4.5–5.9)
SODIUM SERPL-SCNC: 154 MEQ/L (ref 136–145)
WBC # BLD AUTO: 9.3 TH/MM3 (ref 4–11)

## 2018-03-10 RX ADMIN — CHLORHEXIDINE GLUCONATE 0.12% ORAL RINSE SCH ML: 1.2 LIQUID ORAL at 20:00

## 2018-03-10 RX ADMIN — MAGNESIUM HYDROXIDE SCH ML: 400 SUSPENSION ORAL at 21:00

## 2018-03-10 RX ADMIN — PROPOFOL PRN MLS/HR: 10 INJECTION, EMULSION INTRAVENOUS at 07:28

## 2018-03-10 RX ADMIN — IPRATROPIUM BROMIDE AND ALBUTEROL SULFATE SCH AMPULE: .5; 3 SOLUTION RESPIRATORY (INHALATION) at 03:09

## 2018-03-10 RX ADMIN — DOCUSATE SODIUM SCH MG: 100 CAPSULE, LIQUID FILLED ORAL at 07:43

## 2018-03-10 RX ADMIN — SODIUM CHLORIDE PRN MLS/HR: 900 IRRIGANT IRRIGATION at 05:35

## 2018-03-10 RX ADMIN — PHENYTOIN SODIUM SCH MLS/HR: 50 INJECTION INTRAMUSCULAR; INTRAVENOUS at 19:48

## 2018-03-10 RX ADMIN — IPRATROPIUM BROMIDE AND ALBUTEROL SULFATE SCH AMPULE: .5; 3 SOLUTION RESPIRATORY (INHALATION) at 15:15

## 2018-03-10 RX ADMIN — IPRATROPIUM BROMIDE AND ALBUTEROL SULFATE SCH AMPULE: .5; 3 SOLUTION RESPIRATORY (INHALATION) at 21:06

## 2018-03-10 RX ADMIN — MAGNESIUM HYDROXIDE SCH ML: 400 SUSPENSION ORAL at 07:43

## 2018-03-10 RX ADMIN — CEFAZOLIN SODIUM SCH MLS/HR: 2 SOLUTION INTRAVENOUS at 18:02

## 2018-03-10 RX ADMIN — IPRATROPIUM BROMIDE AND ALBUTEROL SULFATE SCH AMPULE: .5; 3 SOLUTION RESPIRATORY (INHALATION) at 23:03

## 2018-03-10 RX ADMIN — IPRATROPIUM BROMIDE AND ALBUTEROL SULFATE SCH AMPULE: .5; 3 SOLUTION RESPIRATORY (INHALATION) at 11:20

## 2018-03-10 RX ADMIN — SODIUM CHLORIDE SCH MLS/HR: 900 INJECTION INTRAVENOUS at 14:39

## 2018-03-10 RX ADMIN — PROPOFOL PRN MLS/HR: 10 INJECTION, EMULSION INTRAVENOUS at 13:17

## 2018-03-10 RX ADMIN — PROPOFOL PRN MLS/HR: 10 INJECTION, EMULSION INTRAVENOUS at 03:12

## 2018-03-10 RX ADMIN — CHLORHEXIDINE GLUCONATE 0.12% ORAL RINSE SCH ML: 1.2 LIQUID ORAL at 07:43

## 2018-03-10 RX ADMIN — SODIUM CHLORIDE PRN MLS/HR: 900 IRRIGANT IRRIGATION at 14:39

## 2018-03-10 RX ADMIN — Medication PRN MLS/HR: at 14:38

## 2018-03-10 RX ADMIN — IPRATROPIUM BROMIDE AND ALBUTEROL SULFATE SCH AMPULE: .5; 3 SOLUTION RESPIRATORY (INHALATION) at 07:33

## 2018-03-10 RX ADMIN — CEFAZOLIN SODIUM SCH MLS/HR: 2 SOLUTION INTRAVENOUS at 04:19

## 2018-03-10 RX ADMIN — Medication PRN MLS/HR: at 00:28

## 2018-03-10 RX ADMIN — PYRIDOXINE HYDROCHLORIDE SCH MLS/HR: 100 INJECTION, SOLUTION INTRAMUSCULAR; INTRAVENOUS at 23:06

## 2018-03-10 RX ADMIN — SODIUM CHLORIDE SCH MLS/HR: 3 INJECTION, SOLUTION INTRAVENOUS at 14:38

## 2018-03-10 RX ADMIN — PROPOFOL PRN MLS/HR: 10 INJECTION, EMULSION INTRAVENOUS at 23:08

## 2018-03-10 RX ADMIN — PHENYTOIN SODIUM SCH MLS/HR: 50 INJECTION INTRAMUSCULAR; INTRAVENOUS at 06:28

## 2018-03-10 RX ADMIN — LEVETIRACETAM SCH MLS/HR: 100 INJECTION, SOLUTION, CONCENTRATE INTRAVENOUS at 20:37

## 2018-03-10 RX ADMIN — CEFAZOLIN SODIUM SCH MLS/HR: 2 SOLUTION INTRAVENOUS at 10:36

## 2018-03-10 RX ADMIN — BACITRACIN SCH APPLIC: 500 OINTMENT TOPICAL at 21:00

## 2018-03-10 RX ADMIN — PHENYTOIN SODIUM SCH MLS/HR: 50 INJECTION INTRAMUSCULAR; INTRAVENOUS at 14:30

## 2018-03-10 RX ADMIN — CLINDAMYCIN PHOSPHATE SCH MLS/HR: 150 INJECTION, SOLUTION INTRAMUSCULAR; INTRAVENOUS at 23:05

## 2018-03-10 RX ADMIN — SODIUM CHLORIDE SCH MLS/HR: 900 INJECTION INTRAVENOUS at 09:08

## 2018-03-10 RX ADMIN — PROPOFOL PRN MLS/HR: 10 INJECTION, EMULSION INTRAVENOUS at 18:02

## 2018-03-10 RX ADMIN — DOCUSATE SODIUM SCH MG: 100 CAPSULE, LIQUID FILLED ORAL at 21:00

## 2018-03-10 RX ADMIN — LEVETIRACETAM SCH MLS/HR: 100 INJECTION, SOLUTION, CONCENTRATE INTRAVENOUS at 09:08

## 2018-03-10 RX ADMIN — BACITRACIN SCH APPLIC: 500 OINTMENT TOPICAL at 09:00

## 2018-03-10 RX ADMIN — CHLORHEXIDINE GLUCONATE SCH PACK: 500 CLOTH TOPICAL at 04:00

## 2018-03-10 RX ADMIN — SODIUM CHLORIDE PRN MLS/HR: 900 IRRIGANT IRRIGATION at 00:13

## 2018-03-10 RX ADMIN — PANTOPRAZOLE SODIUM SCH MG: 40 INJECTION, POWDER, FOR SOLUTION INTRAVENOUS at 20:37
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
## 2018-03-10 NOTE — RADRPT
EXAM DATE/TIME:  03/10/2018 08:24 

 

[facility] COMPARISON:     

FOREARM RIGHT (2VWS), March 08, 2018, 19:39.

 

                     

INDICATIONS :     

Right wrist fracture.

                     

 

MEDICAL HISTORY :            

rib,scapula and leg fractures, face fractures, brain bleed   

 

SURGICAL HISTORY :        

left clavicle

 

ENCOUNTER:     

Subsequent                                        

 

ACUITY:     

1 day      

 

PAIN SCORE:     

Non-responsive.

 

LOCATION:     

Right upper extremity 

 

FINDINGS:     

Transverse nondisplaced fracture through the distal right radius is again noted and appear stable. Ra
diocarpal joint remain satisfactory alignment. External splint has been applied.

 

CONCLUSION:     

No change in appearance of distal right radial fracture.

 

 

 

 Elan Sommer MD on March 10, 2018 at 9:03           

Board Certified Radiologist.

 This report was verified electronically.
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
## 2018-03-10 NOTE — PD.ORT.PN
Subjective


Subjective Remarks


Pt on [facility] ventilation.  RN at bedside.  No orthopedic interval changes.





Objective


Vitals





Vital Signs








  Date Time  Temp Pulse Resp B/P (MAP) Pulse Ox O2 Delivery O2 Flow Rate FiO2


 


3/10/18 07:33     100   50


 


3/10/18 07:33     100   50


 


3/10/18 07:00     100 Mechanical Ventilator  50


 


3/10/18 06:00  71      


 


3/10/18 05:35  67  137/73    


 


3/10/18 04:00  84      


 


3/10/18 04:00 98.6 84 18 116/62 (80) 100   


 


3/10/18 03:10     100   60


 


3/10/18 02:00  66      


 


3/10/18 01:17 98.6 64 18 127/63 100   


 


3/10/18 01:01 98.6 65 18 128/62 100   


 


3/10/18 00:13  63  124/66    


 


3/10/18 00:00  62      


 


3/10/18 00:00 98.6 62 18 124/68 (86) 100   


 


3/9/18 23:44     100   60


 


3/9/18 23:44     100   60


 


3/9/18 22:00  68      


 


3/9/18 21:27  84  126/65    


 


3/9/18 21:02     100   60


 


3/9/18 20:00  74      


 


3/9/18 20:00 97.9 74 18 104/52 (69) 100   


 


3/9/18 19:00     100 Mechanical Ventilator  60


 


3/9/18 18:00  73      


 


3/9/18 18:00     100   100


 


3/9/18 16:59  78      


 


3/9/18 14:00  79      


 


3/9/18 12:44     100   60


 


3/9/18 12:44     100   60


 


3/9/18 12:00 100.0 86 18 111/58 (75) 95   


 


3/9/18 12:00  85      


 


3/9/18 11:00 99.7 88 18 130/68 (88) 100   


 


3/9/18 10:35 99.7 88 18 123/64 94   


 


3/9/18 10:24  81  125/65    


 


3/9/18 10:00  78      


 


3/9/18 09:45 99.7 80 18 138/70 100   


 


3/9/18 09:45 99.7 78 18 139/70 100   


 


3/9/18 09:20 99.7 79 18 138/70 100   


 


3/9/18 09:20 99.7 83 11 138/70 100   


 


3/9/18 09:20 99.7 78 18 138/69 100   


 


3/9/18 09:05  96  117/57    


 


3/9/18 08:56     100   


 


3/9/18 08:47     100   60














I/O      


 


 3/9/18 3/9/18 3/9/18 3/10/18 3/10/18 3/10/18





 07:00 15:00 23:00 07:00 15:00 23:00


 


Intake Total 6546 ml 1341 ml 1605 ml 4088.2 ml 35 ml 


 


Output Total   2400 ml 950 ml  


 


Balance 6546 ml 1341 ml -795 ml 3138.2 ml 35 ml 


 


      


 


Intake IV Total 5100 ml  855 ml 3438.2 ml 35 ml 


 


Packed Cells  400 ml  400 ml  


 


FFP  941 ml    


 


Cryoprecipitate 946 ml     


 


Blood Product IV Normal Saline Flush 500 ml   250 ml  


 


Other   750 ml   


 


Output Urine Total   1450 ml 950 ml  


 


Estimated Blood Loss   200 ml   


 


Other   750 ml   








Result Diagram:  


3/10/18 0425                                                                   

             3/10/18 0425





Imaging





Last 24 hours Impressions








Chest X-Ray 3/9/18 0000 Signed





Impressions: 





 Service Date/Time:  Friday, March 9, 2018 02:33 - CONCLUSION:  Modest 

worsening 





 left base consolidation and with a probable tiny left pleural effusion 





 developing.     Jose Enrique Chiu MD 


 


Pelvis X-Ray 3/8/18 1941 Signed





Impressions: 





 Service Date/Time:  Thursday, March 8, 2018 19:39 - CONCLUSION:  No gross 





 abnormality seen on this limited exam.     Fredy Marquez MD 


 


Maxillofacial CT 3/8/18 1941 Signed





Impressions: 





 Service Date/Time:  Thursday, March 8, 2018 19:45 - CONCLUSION:  1. Bilateral 





 LeFort III facial fractures with significant impaction.. 2. Intracranial 





 displacement of the right superior orbital fracture and comminuted fracture of 





 the anterior paolo carlyn.     Fredy Marquez MD 


 


Head CT 3/8/18 1941 Signed





Impressions: 





 Service Date/Time:  Thursday, March 8, 2018 19:45 - CONCLUSION:  1. 

Intracranial 





 hemorrhage predominately on the right including subarachnoid (high convexity), 





 intraparenchymal (right occipital), subdural (right temporal) and epidural (

high 





 convexity right parietal). 2.    LeFort III facial bone fractures with crush 





 injury of the maxillary and ethmoid regions.     Fredy Marquez MD 


 


Chest X-Ray 3/8/18 1941 Signed





Impressions: 





 Service Date/Time:  Thursday, March 8, 2018 19:39 - CONCLUSION:  1. ET tube in 





 good position. 2. Bilateral rib fractures, nondisplaced and multilevel on the 





 right and with a displaced posterior 3rd rib fragment.     Fredy Marquez MD 


 


Chest CT 3/8/18 1941 Signed





Impressions: 





 Service Date/Time:  Thursday, March 8, 2018 20:17 - CONCLUSION:  1. Hairline 





 fracture the superior endplate of T7 with concentric paraspinal hematoma 





 extending 7 cm superior/inferior. 2. Multiple nondisplaced fractures of the 





 posterolateral 7th and 10th ribs. 3. Comminuted fracture of the body of the 

left 





 scapula. 4. Hiatus hernia containing the gastric fundus. 5. Probable contusion 





 in the posterior segment right upper lobe and posterolateral left lower chest.

   





   Fredy Marquez MD 


 


Cervical Spine CT 3/8/18 1941 Signed





Impressions: 





 Service Date/Time:  Thursday, March 8, 2018 19:45 - CONCLUSION:  No evidence 

of 





 acute fracture or spondylolisthesis.     Fredy Marquez MD 


 


Abdomen/Pelvis CT 3/8/18 1941 Signed





Impressions: 





 Service Date/Time:  Thursday, March 8, 2018 20:17 - CONCLUSION:  1. Acute left 





 rib fractures and left transverse process fractures. 2. Hiatus hernia 

containing 





 the gastric fundus. 3. The solid and hollow organs of the abdomen/pelvis 

appear 





 grossly intact.     Fredy Marquez MD 








Objective Remarks


Pt laying in bed


[facility] ventilation


VSS





RLE: External fixator in place with clean dry dressings. Intact distal pulses 

and good capillary refills


RUE: Splint, dressings clean and dry. intact, +nvi


LUE: dressings clean and dry. intact. +nvi





Assessment & Plan


Ortho Post Op Day #:  1


Problem List:  


Assessment and Plan


1) Right Open Distal Femur Fx and Right Tibial Plateau Fx status post external 

fixation, irrigation debridement and traumatic wound closure POD #1


3) Right Radius/Ulna Shaft Fxs


4) Left Distal 1/3 Humerus Fx





Pt on mechanical ventilation. 


Continue cardiopulmonary support. 


Maintain splints upper extremity


Maintain external fixation with pin care twice a day. 


Definitive fixation of the right distal femur, plateau and bilateral upper 

extremities will occur once patient is more stable.











Jaclyn Borjas Mar 10, 2018 08:16

## 2018-03-11 VITALS
TEMPERATURE: 99.3 F | HEART RATE: 108 BPM | RESPIRATION RATE: 18 BRPM | DIASTOLIC BLOOD PRESSURE: 68 MMHG | SYSTOLIC BLOOD PRESSURE: 122 MMHG | OXYGEN SATURATION: 93 %

## 2018-03-11 VITALS — DIASTOLIC BLOOD PRESSURE: 77 MMHG | HEART RATE: 74 BPM | SYSTOLIC BLOOD PRESSURE: 142 MMHG

## 2018-03-11 VITALS
RESPIRATION RATE: 16 BRPM | OXYGEN SATURATION: 98 % | TEMPERATURE: 99 F | DIASTOLIC BLOOD PRESSURE: 67 MMHG | SYSTOLIC BLOOD PRESSURE: 125 MMHG | HEART RATE: 83 BPM

## 2018-03-11 VITALS
SYSTOLIC BLOOD PRESSURE: 134 MMHG | HEART RATE: 82 BPM | RESPIRATION RATE: 16 BRPM | DIASTOLIC BLOOD PRESSURE: 74 MMHG | OXYGEN SATURATION: 100 % | TEMPERATURE: 100 F

## 2018-03-11 VITALS — HEART RATE: 104 BPM

## 2018-03-11 VITALS
TEMPERATURE: 98.2 F | DIASTOLIC BLOOD PRESSURE: 68 MMHG | HEART RATE: 99 BPM | SYSTOLIC BLOOD PRESSURE: 125 MMHG | OXYGEN SATURATION: 96 % | RESPIRATION RATE: 17 BRPM

## 2018-03-11 VITALS — OXYGEN SATURATION: 94 %

## 2018-03-11 VITALS
SYSTOLIC BLOOD PRESSURE: 115 MMHG | HEART RATE: 93 BPM | TEMPERATURE: 99.5 F | DIASTOLIC BLOOD PRESSURE: 72 MMHG | RESPIRATION RATE: 17 BRPM | OXYGEN SATURATION: 96 %

## 2018-03-11 VITALS — HEART RATE: 90 BPM

## 2018-03-11 VITALS — HEART RATE: 98 BPM

## 2018-03-11 VITALS
TEMPERATURE: 98.2 F | HEART RATE: 78 BPM | DIASTOLIC BLOOD PRESSURE: 84 MMHG | SYSTOLIC BLOOD PRESSURE: 156 MMHG | RESPIRATION RATE: 16 BRPM | OXYGEN SATURATION: 97 %

## 2018-03-11 VITALS — OXYGEN SATURATION: 99 %

## 2018-03-11 VITALS — SYSTOLIC BLOOD PRESSURE: 117 MMHG | DIASTOLIC BLOOD PRESSURE: 78 MMHG | HEART RATE: 74 BPM

## 2018-03-11 VITALS — OXYGEN SATURATION: 100 %

## 2018-03-11 VITALS — OXYGEN SATURATION: 95 %

## 2018-03-11 VITALS — HEART RATE: 83 BPM

## 2018-03-11 LAB
ALBUMIN SERPL-MCNC: 2.2 GM/DL (ref 3.4–5)
ALP SERPL-CCNC: 51 U/L (ref 45–117)
ALT SERPL-CCNC: 17 U/L (ref 12–78)
AST SERPL-CCNC: 36 U/L (ref 15–37)
BASOPHILS # BLD AUTO: 0 TH/MM3 (ref 0–0.2)
BASOPHILS NFR BLD: 0.2 % (ref 0–2)
BILIRUB SERPL-MCNC: 0.3 MG/DL (ref 0.2–1)
BUN SERPL-MCNC: 18 MG/DL (ref 7–18)
CALCIUM SERPL-MCNC: 7 MG/DL (ref 8.5–10.1)
CALCIUM TP COR SERPL-MCNC: 8.2 MG/DL (ref 8.5–10.1)
CHLORIDE SERPL-SCNC: 125 MEQ/L (ref 98–107)
CREAT SERPL-MCNC: 0.9 MG/DL (ref 0.6–1.3)
EOSINOPHIL # BLD: 0 TH/MM3 (ref 0–0.4)
EOSINOPHIL NFR BLD: 0.4 % (ref 0–4)
ERYTHROCYTE [DISTWIDTH] IN BLOOD BY AUTOMATED COUNT: 18.3 % (ref 11.6–17.2)
GFR SERPLBLD BASED ON 1.73 SQ M-ARVRAT: 84 ML/MIN (ref 89–?)
GLUCOSE SERPL-MCNC: 119 MG/DL (ref 74–106)
HCO3 BLD-SCNC: 21.3 MEQ/L (ref 21–32)
HCT VFR BLD CALC: 24.8 % (ref 39–51)
HGB BLD-MCNC: 8.6 GM/DL (ref 13–17)
LYMPHOCYTES # BLD AUTO: 1.3 TH/MM3 (ref 1–4.8)
LYMPHOCYTES NFR BLD AUTO: 15.3 % (ref 9–44)
MCH RBC QN AUTO: 28.5 PG (ref 27–34)
MCHC RBC AUTO-ENTMCNC: 34.8 % (ref 32–36)
MCV RBC AUTO: 82 FL (ref 80–100)
MONOCYTE #: 0.7 TH/MM3 (ref 0–0.9)
MONOCYTES NFR BLD: 8.1 % (ref 0–8)
NEUTROPHILS # BLD AUTO: 6.5 TH/MM3 (ref 1.8–7.7)
NEUTROPHILS NFR BLD AUTO: 76 % (ref 16–70)
PLATELET # BLD: 63 TH/MM3 (ref 150–450)
PMV BLD AUTO: 8.6 FL (ref 7–11)
PROT SERPL-MCNC: 4.9 GM/DL (ref 6.4–8.2)
RBC # BLD AUTO: 3.03 MIL/MM3 (ref 4.5–5.9)
SODIUM SERPL-SCNC: 154 MEQ/L (ref 136–145)
WBC # BLD AUTO: 8.5 TH/MM3 (ref 4–11)

## 2018-03-11 RX ADMIN — POTASSIUM CHLORIDE SCH MLS/HR: 200 INJECTION, SOLUTION INTRAVENOUS at 11:48

## 2018-03-11 RX ADMIN — IPRATROPIUM BROMIDE AND ALBUTEROL SULFATE SCH AMPULE: .5; 3 SOLUTION RESPIRATORY (INHALATION) at 09:04

## 2018-03-11 RX ADMIN — CEFAZOLIN SODIUM SCH MLS/HR: 2 SOLUTION INTRAVENOUS at 11:39

## 2018-03-11 RX ADMIN — IPRATROPIUM BROMIDE AND ALBUTEROL SULFATE SCH AMPULE: .5; 3 SOLUTION RESPIRATORY (INHALATION) at 14:53

## 2018-03-11 RX ADMIN — PHENYTOIN SODIUM SCH MLS/HR: 50 INJECTION INTRAMUSCULAR; INTRAVENOUS at 02:28

## 2018-03-11 RX ADMIN — CLINDAMYCIN PHOSPHATE SCH MLS/HR: 150 INJECTION, SOLUTION INTRAMUSCULAR; INTRAVENOUS at 14:32

## 2018-03-11 RX ADMIN — LEVETIRACETAM SCH MLS/HR: 100 INJECTION, SOLUTION, CONCENTRATE INTRAVENOUS at 09:19

## 2018-03-11 RX ADMIN — CLINDAMYCIN PHOSPHATE SCH MLS/HR: 150 INJECTION, SOLUTION INTRAMUSCULAR; INTRAVENOUS at 06:39

## 2018-03-11 RX ADMIN — DOCUSATE SODIUM SCH MG: 100 CAPSULE, LIQUID FILLED ORAL at 08:32

## 2018-03-11 RX ADMIN — FUROSEMIDE SCH MG: 10 INJECTION, SOLUTION INTRAMUSCULAR; INTRAVENOUS at 11:49

## 2018-03-11 RX ADMIN — MAGNESIUM HYDROXIDE SCH ML: 400 SUSPENSION ORAL at 21:00

## 2018-03-11 RX ADMIN — CEFAZOLIN SODIUM SCH MLS/HR: 2 SOLUTION INTRAVENOUS at 17:46

## 2018-03-11 RX ADMIN — CHLORHEXIDINE GLUCONATE 0.12% ORAL RINSE SCH ML: 1.2 LIQUID ORAL at 08:00

## 2018-03-11 RX ADMIN — CHLORHEXIDINE GLUCONATE SCH PACK: 500 CLOTH TOPICAL at 04:00

## 2018-03-11 RX ADMIN — DOCUSATE SODIUM SCH MG: 100 CAPSULE, LIQUID FILLED ORAL at 21:00

## 2018-03-11 RX ADMIN — POTASSIUM CHLORIDE SCH MLS/HR: 200 INJECTION, SOLUTION INTRAVENOUS at 14:31

## 2018-03-11 RX ADMIN — PROPOFOL PRN MLS/HR: 10 INJECTION, EMULSION INTRAVENOUS at 05:59

## 2018-03-11 RX ADMIN — PROPOFOL PRN MLS/HR: 10 INJECTION, EMULSION INTRAVENOUS at 11:00

## 2018-03-11 RX ADMIN — Medication PRN MLS/HR: at 17:47

## 2018-03-11 RX ADMIN — BACITRACIN SCH APPLIC: 500 OINTMENT TOPICAL at 23:28

## 2018-03-11 RX ADMIN — PANTOPRAZOLE SODIUM SCH MG: 40 INJECTION, POWDER, FOR SOLUTION INTRAVENOUS at 21:00

## 2018-03-11 RX ADMIN — PROPOFOL PRN MLS/HR: 10 INJECTION, EMULSION INTRAVENOUS at 15:33

## 2018-03-11 RX ADMIN — Medication PRN MLS/HR: at 03:05

## 2018-03-11 RX ADMIN — IPRATROPIUM BROMIDE AND ALBUTEROL SULFATE SCH AMPULE: .5; 3 SOLUTION RESPIRATORY (INHALATION) at 11:05

## 2018-03-11 RX ADMIN — SODIUM CHLORIDE SCH MLS/HR: 900 INJECTION INTRAVENOUS at 09:19

## 2018-03-11 RX ADMIN — IPRATROPIUM BROMIDE AND ALBUTEROL SULFATE SCH AMPULE: .5; 3 SOLUTION RESPIRATORY (INHALATION) at 19:53

## 2018-03-11 RX ADMIN — BACITRACIN SCH APPLIC: 500 OINTMENT TOPICAL at 08:32

## 2018-03-11 RX ADMIN — MAGNESIUM HYDROXIDE SCH ML: 400 SUSPENSION ORAL at 08:32

## 2018-03-11 RX ADMIN — CEFAZOLIN SODIUM SCH MLS/HR: 2 SOLUTION INTRAVENOUS at 03:04

## 2018-03-11 RX ADMIN — PROPOFOL PRN MLS/HR: 10 INJECTION, EMULSION INTRAVENOUS at 19:14

## 2018-03-11 RX ADMIN — IPRATROPIUM BROMIDE AND ALBUTEROL SULFATE SCH AMPULE: .5; 3 SOLUTION RESPIRATORY (INHALATION) at 03:19

## 2018-03-11 RX ADMIN — LEVETIRACETAM SCH MLS/HR: 100 INJECTION, SOLUTION, CONCENTRATE INTRAVENOUS at 21:00

## 2018-03-11 RX ADMIN — SODIUM CHLORIDE SCH MLS/HR: 900 INJECTION INTRAVENOUS at 11:00

## 2018-03-11 RX ADMIN — CHLORHEXIDINE GLUCONATE 0.12% ORAL RINSE SCH ML: 1.2 LIQUID ORAL at 20:00
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
## 2018-03-11 NOTE — RADRPT
EXAM DATE/TIME:  03/11/2018 04:43 

 

[facility] COMPARISON:     

CHEST SINGLE AP, March 10, 2018, 2:42.

 

                     

INDICATIONS :     

Evaluate lung status after trauma.

                     

 

MEDICAL HISTORY :     

None.          

 

SURGICAL HISTORY :     

None.   

 

ENCOUNTER:     

Subsequent                                        

 

ACUITY:     

4 - 6 days      

 

PAIN SCORE:     

Non-responsive.

 

LOCATION:     

Bilateral chest 

 

FINDINGS:     

Bilateral basilar consolidation and effusions present, mild/small in the right and moderate on the le
ft. A left chest tube remains in place at the apex. No pneumothorax seen. Multiple left rib fractures
 are again noted.

 

Heart size stable, upper limits of normal.

 

Endotracheal tube tip is approximately 3 cm above the tarik.

 

CONCLUSION:     

No significant change left greater than right basilar consolidation and pleural effusions. No pneumot
horax seen.

 

 

 

 Jose Enrique Chiu MD on March 11, 2018 at 6:20           

Board Certified Radiologist.

 This report was verified electronically.
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
## 2018-03-11 NOTE — PD.ORT.PN
Subjective


Subjective Remarks


Pt on [facility] ventilation.  RN at bedside -  No orthopedic interval changes.





Objective


Vitals





Vital Signs








  Date Time  Temp Pulse Resp B/P (MAP) Pulse Ox O2 Delivery O2 Flow Rate FiO2


 


3/11/18 09:00     100   70


 


3/11/18 08:30  82  126/75    


 


3/11/18 08:00 98.2 78 16 156/84 (108) 97   


 


3/11/18 08:00  78  156/84    


 


3/11/18 08:00  78      


 


3/11/18 07:30     100 Mechanical Ventilator  70


 


3/11/18 07:30  76  144/78    


 


3/11/18 07:00     100 Mechanical Ventilator  100


 


3/11/18 06:00  77      


 


3/11/18 06:00  74  142/77 (98)    


 


3/11/18 04:00 100.0 82 16 134/74 (94) 100   


 


3/11/18 04:00  82      


 


3/11/18 03:19     99   40


 


3/11/18 02:00  83      


 


3/11/18 00:00 99.0 83 16 125/67 (86) 98   


 


3/11/18 00:00  80      


 


3/10/18 23:03     100   40


 


3/10/18 22:00  77      


 


3/10/18 21:06     100   40


 


3/10/18 20:00  74      


 


3/10/18 20:00 98.6 77 16 118/62 (80) 98   


 


3/10/18 19:00     96   40


 


3/10/18 18:14  74  125/65    


 


3/10/18 18:00  74  122/65 (84)    


 


3/10/18 18:00  74      


 


3/10/18 16:00  80      


 


3/10/18 16:00 98.6 80 16 118/62 (80) 98   


 


3/10/18 15:34  78  134/68    


 


3/10/18 15:16     100   40


 


3/10/18 15:00  75  132/65    


 


3/10/18 14:39  73  134/67    


 


3/10/18 14:39  70  132/68    


 


3/10/18 14:30  70  131/68    


 


3/10/18 14:00  72      


 


3/10/18 12:07  71  133/71    


 


3/10/18 12:00  71      


 


3/10/18 12:00 98.6 71 16 130/68 (88) 100   


 


3/10/18 11:50 98.6 75 16 132/74 100   


 


3/10/18 11:22 98.8 80 16 127/67 100   


 


3/10/18 11:20     100   40


 


3/10/18 10:44 98.8 77 16 135/70 100   


 


3/10/18 10:25 98.8 83 16 127/68 100   


 


3/10/18 10:00  80      














I/O      


 


 3/10/18 3/10/18 3/10/18 3/11/18 3/11/18 3/11/18





 07:00 15:00 23:00 07:00 15:00 23:00


 


Intake Total 4088.2 ml 3295 ml 185 ml 2468.2 ml 102 ml 


 


Output Total 950 ml  900 ml 550 ml  


 


Balance 3138.2 ml 3295 ml -715 ml 1918.2 ml 102 ml 


 


      


 


Intake IV Total 3438.2 ml 2485 ml 185 ml 2468.2 ml 102 ml 


 


Packed Cells 400 ml 800 ml    


 


Blood Product IV Normal Saline Flush 250 ml 10 ml    


 


Output Urine Total 950 ml  900 ml 550 ml  


 


# Bowel Movements   0   








Result Diagram:  


3/11/18 0415                                                                   

             3/11/18 0415





Imaging





Last 24 hours Impressions








Chest X-Ray 3/9/18 0000 Signed





Impressions: 





 Service Date/Time:  Friday, March 9, 2018 02:33 - CONCLUSION:  Modest 

worsening 





 left base consolidation and with a probable tiny left pleural effusion 





 developing.     Jose Enrique Chiu MD 


 


Pelvis X-Ray 3/8/18 1941 Signed





Impressions: 





 Service Date/Time:  Thursday, March 8, 2018 19:39 - CONCLUSION:  No gross 





 abnormality seen on this limited exam.     Fredy Marquez MD 


 


Maxillofacial CT 3/8/18 1941 Signed





Impressions: 





 Service Date/Time:  Thursday, March 8, 2018 19:45 - CONCLUSION:  1. Bilateral 





 LeFort III facial fractures with significant impaction.. 2. Intracranial 





 displacement of the right superior orbital fracture and comminuted fracture of 





 the anterior paolo carlyn.     Fredy Marquez MD 


 


Head CT 3/8/18 1941 Signed





Impressions: 





 Service Date/Time:  Thursday, March 8, 2018 19:45 - CONCLUSION:  1. 

Intracranial 





 hemorrhage predominately on the right including subarachnoid (high convexity), 





 intraparenchymal (right occipital), subdural (right temporal) and epidural (

high 





 convexity right parietal). 2.    LeFort III facial bone fractures with crush 





 injury of the maxillary and ethmoid regions.     Fredy Marquez MD 


 


Chest X-Ray 3/8/18 1941 Signed





Impressions: 





 Service Date/Time:  Thursday, March 8, 2018 19:39 - CONCLUSION:  1. ET tube in 





 good position. 2. Bilateral rib fractures, nondisplaced and multilevel on the 





 right and with a displaced posterior 3rd rib fragment.     Fredy Marquez MD 


 


Chest CT 3/8/18 1941 Signed





Impressions: 





 Service Date/Time:  Thursday, March 8, 2018 20:17 - CONCLUSION:  1. Hairline 





 fracture the superior endplate of T7 with concentric paraspinal hematoma 





 extending 7 cm superior/inferior. 2. Multiple nondisplaced fractures of the 





 posterolateral 7th and 10th ribs. 3. Comminuted fracture of the body of the 

left 





 scapula. 4. Hiatus hernia containing the gastric fundus. 5. Probable contusion 





 in the posterior segment right upper lobe and posterolateral left lower chest.

   





   Fredy Marquez MD 


 


Cervical Spine CT 3/8/18 1941 Signed





Impressions: 





 Service Date/Time:  Thursday, March 8, 2018 19:45 - CONCLUSION:  No evidence 

of 





 acute fracture or spondylolisthesis.     Fredy Marquez MD 


 


Abdomen/Pelvis CT 3/8/18 1941 Signed





Impressions: 





 Service Date/Time:  Thursday, March 8, 2018 20:17 - CONCLUSION:  1. Acute left 





 rib fractures and left transverse process fractures. 2. Hiatus hernia 

containing 





 the gastric fundus. 3. The solid and hollow organs of the abdomen/pelvis 

appear 





 grossly intact.     Fredy Marquez MD 








Objective Remarks


Pt laying in bed


Mech ventilation


VSS





RLE: External fixator in place with clean dry dressings. Intact distal pulses 

and good capillary refills


RUE: Splint, dressings clean and dry. Mild swelling into hand, +nvi


LUE: dressings clean and dry. Moderate swelling hand, +nvi





Assessment & Plan


Assessment and Plan


1) Right Open Distal Femur Fx and Right Tibial Plateau Fx status post external 

fixation, irrigation debridement and traumatic wound closure POD #2


3) Right Radius/Ulna Shaft Fxs


4) Left Distal 1/3 Humerus Fx





Pt on mechanical ventilation. 


RN states pt is currently NPO because they cannot place an NG tube. 


Continue cardiopulmonary support. 


Maintain splints upper extremity


Maintain external fixation with pin care twice a day. 


Definitive fixation of the right distal femur, plateau and bilateral upper 

extremities will occur once patient is more stable.


Dr. Burton and his team will evaluate tomorrow.











Jaclyn Borjas Mar 11, 2018 09:10

## 2018-03-12 VITALS — HEART RATE: 82 BPM

## 2018-03-12 VITALS
RESPIRATION RATE: 16 BRPM | TEMPERATURE: 98.7 F | HEART RATE: 77 BPM | OXYGEN SATURATION: 99 % | SYSTOLIC BLOOD PRESSURE: 130 MMHG | DIASTOLIC BLOOD PRESSURE: 69 MMHG

## 2018-03-12 VITALS
SYSTOLIC BLOOD PRESSURE: 132 MMHG | HEART RATE: 74 BPM | OXYGEN SATURATION: 97 % | DIASTOLIC BLOOD PRESSURE: 71 MMHG | TEMPERATURE: 97.8 F | RESPIRATION RATE: 16 BRPM

## 2018-03-12 VITALS
TEMPERATURE: 98.6 F | SYSTOLIC BLOOD PRESSURE: 126 MMHG | DIASTOLIC BLOOD PRESSURE: 82 MMHG | OXYGEN SATURATION: 99 % | HEART RATE: 80 BPM | RESPIRATION RATE: 16 BRPM

## 2018-03-12 VITALS
OXYGEN SATURATION: 97 % | RESPIRATION RATE: 16 BRPM | DIASTOLIC BLOOD PRESSURE: 78 MMHG | SYSTOLIC BLOOD PRESSURE: 142 MMHG | TEMPERATURE: 99 F | HEART RATE: 76 BPM

## 2018-03-12 VITALS
RESPIRATION RATE: 16 BRPM | DIASTOLIC BLOOD PRESSURE: 85 MMHG | SYSTOLIC BLOOD PRESSURE: 123 MMHG | OXYGEN SATURATION: 99 % | TEMPERATURE: 99.1 F | HEART RATE: 88 BPM

## 2018-03-12 VITALS — OXYGEN SATURATION: 96 %

## 2018-03-12 VITALS — OXYGEN SATURATION: 98 %

## 2018-03-12 VITALS
DIASTOLIC BLOOD PRESSURE: 75 MMHG | HEART RATE: 75 BPM | SYSTOLIC BLOOD PRESSURE: 134 MMHG | OXYGEN SATURATION: 96 % | TEMPERATURE: 98.7 F | RESPIRATION RATE: 16 BRPM

## 2018-03-12 VITALS — SYSTOLIC BLOOD PRESSURE: 115 MMHG | DIASTOLIC BLOOD PRESSURE: 59 MMHG | HEART RATE: 71 BPM

## 2018-03-12 VITALS — HEART RATE: 78 BPM

## 2018-03-12 VITALS — OXYGEN SATURATION: 99 %

## 2018-03-12 VITALS — OXYGEN SATURATION: 97 %

## 2018-03-12 VITALS — DIASTOLIC BLOOD PRESSURE: 71 MMHG | HEART RATE: 74 BPM | SYSTOLIC BLOOD PRESSURE: 132 MMHG

## 2018-03-12 VITALS — HEART RATE: 75 BPM

## 2018-03-12 LAB
ALBUMIN SERPL-MCNC: 2.2 GM/DL (ref 3.4–5)
ALP SERPL-CCNC: 61 U/L (ref 45–117)
ALT SERPL-CCNC: 15 U/L (ref 12–78)
AST SERPL-CCNC: 41 U/L (ref 15–37)
BASOPHILS # BLD AUTO: 0 TH/MM3 (ref 0–0.2)
BASOPHILS NFR BLD: 0.3 % (ref 0–2)
BILIRUB SERPL-MCNC: 0.4 MG/DL (ref 0.2–1)
BUN SERPL-MCNC: 19 MG/DL (ref 7–18)
CALCIUM SERPL-MCNC: 7.2 MG/DL (ref 8.5–10.1)
CALCIUM TP COR SERPL-MCNC: 8.2 MG/DL (ref 8.5–10.1)
CHLORIDE SERPL-SCNC: 123 MEQ/L (ref 98–107)
CREAT SERPL-MCNC: 0.88 MG/DL (ref 0.6–1.3)
EOSINOPHIL # BLD: 0 TH/MM3 (ref 0–0.4)
EOSINOPHIL NFR BLD: 0.4 % (ref 0–4)
ERYTHROCYTE [DISTWIDTH] IN BLOOD BY AUTOMATED COUNT: 18.9 % (ref 11.6–17.2)
GFR SERPLBLD BASED ON 1.73 SQ M-ARVRAT: 86 ML/MIN (ref 89–?)
GLUCOSE SERPL-MCNC: 91 MG/DL (ref 74–106)
HCO3 BLD-SCNC: 24.6 MEQ/L (ref 21–32)
HCT VFR BLD CALC: 25.3 % (ref 39–51)
HGB BLD-MCNC: 8.8 GM/DL (ref 13–17)
LYMPHOCYTES # BLD AUTO: 1 TH/MM3 (ref 1–4.8)
LYMPHOCYTES NFR BLD AUTO: 9.5 % (ref 9–44)
MCH RBC QN AUTO: 28.9 PG (ref 27–34)
MCHC RBC AUTO-ENTMCNC: 34.6 % (ref 32–36)
MCV RBC AUTO: 83.5 FL (ref 80–100)
MONOCYTE #: 0.8 TH/MM3 (ref 0–0.9)
MONOCYTES NFR BLD: 7.7 % (ref 0–8)
NEUTROPHILS # BLD AUTO: 8.7 TH/MM3 (ref 1.8–7.7)
NEUTROPHILS NFR BLD AUTO: 82.1 % (ref 16–70)
PLATELET # BLD: 67 TH/MM3 (ref 150–450)
PMV BLD AUTO: 8.2 FL (ref 7–11)
PROT SERPL-MCNC: 5.3 GM/DL (ref 6.4–8.2)
RBC # BLD AUTO: 3.03 MIL/MM3 (ref 4.5–5.9)
SODIUM SERPL-SCNC: 154 MEQ/L (ref 136–145)
WBC # BLD AUTO: 10.6 TH/MM3 (ref 4–11)

## 2018-03-12 RX ADMIN — Medication PRN MLS/HR: at 03:51

## 2018-03-12 RX ADMIN — LEVETIRACETAM SCH MLS/HR: 100 INJECTION, SOLUTION, CONCENTRATE INTRAVENOUS at 08:21

## 2018-03-12 RX ADMIN — BACITRACIN SCH APPLIC: 500 OINTMENT TOPICAL at 08:22

## 2018-03-12 RX ADMIN — FUROSEMIDE SCH MG: 10 INJECTION, SOLUTION INTRAMUSCULAR; INTRAVENOUS at 08:21

## 2018-03-12 RX ADMIN — DOCUSATE SODIUM SCH MG: 100 CAPSULE, LIQUID FILLED ORAL at 08:22

## 2018-03-12 RX ADMIN — SODIUM CHLORIDE SCH MLS/HR: 900 INJECTION INTRAVENOUS at 03:40

## 2018-03-12 RX ADMIN — DOCUSATE SODIUM SCH MG: 100 CAPSULE, LIQUID FILLED ORAL at 21:00

## 2018-03-12 RX ADMIN — CLINDAMYCIN PHOSPHATE SCH MLS/HR: 150 INJECTION, SOLUTION INTRAMUSCULAR; INTRAVENOUS at 15:25

## 2018-03-12 RX ADMIN — PANTOPRAZOLE SODIUM SCH MG: 40 INJECTION, POWDER, FOR SOLUTION INTRAVENOUS at 21:00

## 2018-03-12 RX ADMIN — PROPOFOL PRN MLS/HR: 10 INJECTION, EMULSION INTRAVENOUS at 01:09

## 2018-03-12 RX ADMIN — CHLORHEXIDINE GLUCONATE SCH PACK: 500 CLOTH TOPICAL at 04:00

## 2018-03-12 RX ADMIN — SODIUM CHLORIDE SCH MLS/HR: 900 INJECTION INTRAVENOUS at 20:20

## 2018-03-12 RX ADMIN — CLINDAMYCIN PHOSPHATE SCH MLS/HR: 150 INJECTION, SOLUTION INTRAMUSCULAR; INTRAVENOUS at 06:15

## 2018-03-12 RX ADMIN — CHLORHEXIDINE GLUCONATE 0.12% ORAL RINSE SCH ML: 1.2 LIQUID ORAL at 08:20

## 2018-03-12 RX ADMIN — POTASSIUM CHLORIDE PRN MLS/HR: 400 INJECTION, SOLUTION INTRAVENOUS at 06:04

## 2018-03-12 RX ADMIN — PROPOFOL PRN MLS/HR: 10 INJECTION, EMULSION INTRAVENOUS at 22:30

## 2018-03-12 RX ADMIN — IPRATROPIUM BROMIDE AND ALBUTEROL SULFATE SCH AMPULE: .5; 3 SOLUTION RESPIRATORY (INHALATION) at 11:25

## 2018-03-12 RX ADMIN — PROPOFOL PRN MLS/HR: 10 INJECTION, EMULSION INTRAVENOUS at 05:16

## 2018-03-12 RX ADMIN — CLINDAMYCIN PHOSPHATE SCH MLS/HR: 150 INJECTION, SOLUTION INTRAMUSCULAR; INTRAVENOUS at 00:07

## 2018-03-12 RX ADMIN — LEVETIRACETAM SCH MLS/HR: 100 INJECTION, SOLUTION, CONCENTRATE INTRAVENOUS at 20:59

## 2018-03-12 RX ADMIN — CEFAZOLIN SODIUM SCH MLS/HR: 2 SOLUTION INTRAVENOUS at 03:18

## 2018-03-12 RX ADMIN — IPRATROPIUM BROMIDE AND ALBUTEROL SULFATE SCH AMPULE: .5; 3 SOLUTION RESPIRATORY (INHALATION) at 16:31

## 2018-03-12 RX ADMIN — MAGNESIUM HYDROXIDE SCH ML: 400 SUSPENSION ORAL at 21:00

## 2018-03-12 RX ADMIN — BACITRACIN SCH APPLIC: 500 OINTMENT TOPICAL at 21:00

## 2018-03-12 RX ADMIN — IPRATROPIUM BROMIDE AND ALBUTEROL SULFATE SCH AMPULE: .5; 3 SOLUTION RESPIRATORY (INHALATION) at 00:44

## 2018-03-12 RX ADMIN — IPRATROPIUM BROMIDE AND ALBUTEROL SULFATE SCH AMPULE: .5; 3 SOLUTION RESPIRATORY (INHALATION) at 04:39

## 2018-03-12 RX ADMIN — CEFAZOLIN SODIUM SCH MLS/HR: 2 SOLUTION INTRAVENOUS at 10:47

## 2018-03-12 RX ADMIN — SODIUM CHLORIDE SCH MLS/HR: 900 INJECTION INTRAVENOUS at 08:21

## 2018-03-12 RX ADMIN — MAGNESIUM HYDROXIDE SCH ML: 400 SUSPENSION ORAL at 08:22

## 2018-03-12 RX ADMIN — IPRATROPIUM BROMIDE AND ALBUTEROL SULFATE SCH AMPULE: .5; 3 SOLUTION RESPIRATORY (INHALATION) at 08:38

## 2018-03-12 RX ADMIN — CHLORHEXIDINE GLUCONATE 0.12% ORAL RINSE SCH ML: 1.2 LIQUID ORAL at 20:00

## 2018-03-12 RX ADMIN — IPRATROPIUM BROMIDE AND ALBUTEROL SULFATE SCH AMPULE: .5; 3 SOLUTION RESPIRATORY (INHALATION) at 21:40
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
## 2018-03-12 NOTE — PD.CONS
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 attestation that I personally performed 

the exam, history, and/or decision-making noted today, and, unless otherwise 

indicated, the interactions with patient, family, and staff as well as the 

review of records all occurred today.  I also attest that the listed assessment 

and stated plan reflect my best clinical judgment today based on the 

combination of historical information, prior notes, and today's exam/ 

interactions.  When time spent is documented, it refers only to time spent 

today by the signer, or if indicated, combined time spent today by 

collaborating physician/nurse practitioner.


.











Priscilla Nelson Mar 12, 2018 12:59

## 2018-03-12 NOTE — HHI.NSPN
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 right distal femur. 

irrigation as an placement of an external fixator





Comminuted and angulated fracture of the distal left humerus, displaced and 

comminuted fractures of the proximal right radius and ulna and transverse 

fracture of the distal radial metaphysis.  Will need surgical intervenmtion 

when hemydynamically stable





Contusion right upper lobe and left lower lobe of the lung. Defer to trauma 

surgeon


Aggressive pulmonary toilette, nasotracheal suction, and breathing treatments 

with nebulizers.





Nutrition. Start tube feedings





Renal. monitor closely urine output, BUN and creatinine





Endocrine. Monitor serial Acu checks and SSI as needed in detail





ID monitor for signs of infection





Protonix for stress ulcer prophylaxis














 Point Value = 1          Point Value = 2  Point Value = 3  Point Value = 5


 


Age 41-60


Minor surgery


BMI > 25 kg/m2


Swollen legs


Varicose veins


Pregnancy or postpartum


History of unexplained or recurrent


   spontaneous 


Oral contraceptives or hormone


   replacement


Sepsis (< 1 month)


Serious lung disease, including


   pneumonia (< 1 month)


Abnormal pulmonary function


Acute myocardial infarction


Congestive heart failure (< 1 month)


History of inflammatory bowel disease


Medical patient at bed rest Age 61-74


Arthroscopic surgery


Major open surgery (> 45 min)


Laparoscopic surgery (> 45 min)


Malignancy


Confined to bed (> 72 hours)


Immobilizing plaster cast


Central venous access Age >= 75


History of VTE


Family history of VTE


Factor V Leiden


Prothrombin 84202K


Lupus anticoagulant


Anticardiolipin antibodies


Elevated serum homocysteine


Heparin-induced thrombocytopenia


Other congenital or acquired


   thrombophilia Stroke (< 1 month)


Elective arthroplasty


Hip, pelvis, or leg fracture


Acute spinal cord injury (< 1 month)











Candido saravia and SCD's for DVT prophylaxis. 





Further recommendations will depend on his clinical evaluation and radiological 

studies





The exam, history, and the medical decision-making described in the above note 

were completed with the assistance of the mid-level provider. I reviewed and 

agree with the findings presented.  I attest that I had a face-to-face 

encounter with the patient on the same day, and personally performed and 

documented my assessment and findings in the medical record.


 (Hua Cruz MD)











Meryl Carrera Mar 12, 2018 14:08


Hua Cruz MD Mar 12, 2018 16:52
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
## 2018-03-12 NOTE — RADRPT
EXAM DATE/TIME:  03/12/2018 04:53 

 

[facility] COMPARISON:     

CHEST SINGLE AP, March 11, 2018, 4:43.

 

                     

INDICATIONS :     

Short of breath.

                     

 

MEDICAL HISTORY :     

None.          

 

SURGICAL HISTORY :     

None.   

 

ENCOUNTER:     

Subsequent                                        

 

ACUITY:     

3 days      

 

PAIN SCORE:     

0/10

 

LOCATION:     

Bilateral  chets

 

FINDINGS:     

Again seen are patchy parenchymal opacities throughout both lungs. There is a new opacity within the 
right upper lobe. No discrete effusion is observed. The heart is normal in size. Tip of the endotrach
eal tube 3 cm proximal to tarik. Left clavicular surgical plate.

 

CONCLUSION:     

Some progression in the bilateral parenchymal consolidations.

 

 

 

 Fredy Sadler Jr., MD on March 12, 2018 at 6:11           

Board Certified Radiologist.

 This report was verified electronically.
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
## 2018-03-12 NOTE — PD.ORT.PN
Subjective


Subjective Remarks


s/p MCA


right open distal femur


right tibial plateau


right BBFA


left humerus





Objective


Vitals





Vital Signs








  Date Time  Temp Pulse Resp B/P (MAP) Pulse Ox O2 Delivery O2 Flow Rate FiO2


 


3/12/18 07:00     97 Mechanical Ventilator  50


 


3/12/18 06:00  79      


 


3/12/18 06:00  71  115/59 (77)    


 


3/12/18 04:39     98   50


 


3/12/18 04:00  80      


 


3/12/18 04:00 98.6 80 16 126/82 (97) 99   


 


3/12/18 02:00  82      


 


3/12/18 00:44     99   50


 


3/12/18 00:00  88      


 


3/12/18 00:00 99.1 88 16 123/85 (98) 99   


 


3/11/18 22:00  90      


 


3/11/18 20:00 99.5 95 17 115/72 (86) 96   


 


3/11/18 20:00  93      


 


3/11/18 19:54     95   50


 


3/11/18 19:00     96 Mechanical Ventilator  50


 


3/11/18 18:00  74  117/78 (91)    


 


3/11/18 18:00  102      


 


3/11/18 16:00  108      


 


3/11/18 16:00 99.3 108 18 122/68 (86) 93   


 


3/11/18 14:53     94   50


 


3/11/18 14:53     94   50


 


3/11/18 14:00  104      


 


3/11/18 12:00  99      


 


3/11/18 12:00 98.2 100 17 125/68 (87) 96   


 


3/11/18 11:06     99   60


 


3/11/18 10:00  98      


 


3/11/18 09:00     100   70


 


3/11/18 08:30  82  126/75    














I/O      


 


 3/11/18 3/11/18 3/11/18 3/12/18 3/12/18 3/12/18





 07:00 15:00 23:00 07:00 15:00 23:00


 


Intake Total 2468.2 ml 1482 ml 584 ml 619 ml  


 


Output Total 550 ml  3550 ml 675 ml  


 


Balance 1918.2 ml 1482 ml -2966 ml -56 ml  


 


      


 


Intake IV Total 2468.2 ml 1482 ml 584 ml 619 ml  


 


Output Urine Total 550 ml  3550 ml 675 ml  


 


# Bowel Movements   0   








Result Diagram:  


3/12/18 0400                                                                   

             3/12/18 0400





Imaging





Last 24 hours Impressions








Chest X-Ray 3/9/18 0000 Signed





Impressions: 





 Service Date/Time:  Friday, March 9, 2018 02:33 - CONCLUSION:  Modest 

worsening 





 left base consolidation and with a probable tiny left pleural effusion 





 developing.     Jose Enrique Chiu MD 


 


Pelvis X-Ray 3/8/18 1941 Signed





Impressions: 





 Service Date/Time:  Thursday, March 8, 2018 19:39 - CONCLUSION:  No gross 





 abnormality seen on this limited exam.     Fredy Marquez MD 


 


Maxillofacial CT 3/8/18 1941 Signed





Impressions: 





 Service Date/Time:  Thursday, March 8, 2018 19:45 - CONCLUSION:  1. Bilateral 





 LeFort III facial fractures with significant impaction.. 2. Intracranial 





 displacement of the right superior orbital fracture and comminuted fracture of 





 the anterior paolo carlyn.     Fredy Marquez MD 


 


Head CT 3/8/18 1941 Signed





Impressions: 





 Service Date/Time:  Thursday, March 8, 2018 19:45 - CONCLUSION:  1. 

Intracranial 





 hemorrhage predominately on the right including subarachnoid (high convexity), 





 intraparenchymal (right occipital), subdural (right temporal) and epidural (

high 





 convexity right parietal). 2.    LeFort III facial bone fractures with crush 





 injury of the maxillary and ethmoid regions.     Fredy Marquez MD 


 


Chest X-Ray 3/8/18 1941 Signed





Impressions: 





 Service Date/Time:  Thursday, March 8, 2018 19:39 - CONCLUSION:  1. ET tube in 





 good position. 2. Bilateral rib fractures, nondisplaced and multilevel on the 





 right and with a displaced posterior 3rd rib fragment.     Fredy Marquez MD 


 


Chest CT 3/8/18 1941 Signed





Impressions: 





 Service Date/Time:  Thursday, March 8, 2018 20:17 - CONCLUSION:  1. Hairline 





 fracture the superior endplate of T7 with concentric paraspinal hematoma 





 extending 7 cm superior/inferior. 2. Multiple nondisplaced fractures of the 





 posterolateral 7th and 10th ribs. 3. Comminuted fracture of the body of the 

left 





 scapula. 4. Hiatus hernia containing the gastric fundus. 5. Probable contusion 





 in the posterior segment right upper lobe and posterolateral left lower chest.

   





   Fredy Marquez MD 


 


Cervical Spine CT 3/8/18 1941 Signed





Impressions: 





 Service Date/Time:  Thursday, March 8, 2018 19:45 - CONCLUSION:  No evidence 

of 





 acute fracture or spondylolisthesis.     Fredy Marquez MD 


 


Abdomen/Pelvis CT 3/8/18 1941 Signed





Impressions: 





 Service Date/Time:  Thursday, March 8, 2018 20:17 - CONCLUSION:  1. Acute left 





 rib fractures and left transverse process fractures. 2. Hiatus hernia 

containing 





 the gastric fundus. 3. The solid and hollow organs of the abdomen/pelvis 

appear 





 grossly intact.     Fredy Marquez MD 








Objective Remarks


Pt laying in bed


Mech ventilation


VSS





RLE: External fixator in place with clean dry dressings. Intact distal pulses 

and good capillary refills


RUE: Splint, dressings clean and dry. Mild swelling into hand, +nvi


LUE: dressings clean and dry. Moderate swelling hand, +nvi





Assessment & Plan


Assessment and Plan


1) Right Open Distal Femur Fx and Right Tibial Plateau Fx status post external 

fixation, irrigation debridement and traumatic wound closure POD #4


3) Right Radius/Ulna Shaft Fxs


4) Left Distal 1/3 Humerus Fx





Pt on mechanical ventilation. 


RN states pt is currently NPO because they cannot place an NG tube. 


Continue cardiopulmonary support. 


Maintain splints upper extremity


Maintain external fixation with pin care twice a day. 


Definitive fixation of the right distal femur, plateau and bilateral upper 

extremities planned for potentially tomorrow if patient is stable enough.  Will 

speak with trauma team and eval tomorrow AM. 


sign consents











Galindo Segura/First Assist PA Mar 12, 2018 08:13

## 2018-03-13 VITALS
OXYGEN SATURATION: 100 % | DIASTOLIC BLOOD PRESSURE: 95 MMHG | RESPIRATION RATE: 16 BRPM | TEMPERATURE: 99.1 F | HEART RATE: 80 BPM | SYSTOLIC BLOOD PRESSURE: 105 MMHG

## 2018-03-13 VITALS — OXYGEN SATURATION: 98 %

## 2018-03-13 VITALS
RESPIRATION RATE: 16 BRPM | TEMPERATURE: 99 F | SYSTOLIC BLOOD PRESSURE: 144 MMHG | HEART RATE: 84 BPM | DIASTOLIC BLOOD PRESSURE: 72 MMHG | OXYGEN SATURATION: 99 %

## 2018-03-13 VITALS
DIASTOLIC BLOOD PRESSURE: 78 MMHG | TEMPERATURE: 99.3 F | HEART RATE: 81 BPM | SYSTOLIC BLOOD PRESSURE: 155 MMHG | OXYGEN SATURATION: 98 % | RESPIRATION RATE: 16 BRPM

## 2018-03-13 VITALS
TEMPERATURE: 99.1 F | DIASTOLIC BLOOD PRESSURE: 79 MMHG | OXYGEN SATURATION: 100 % | SYSTOLIC BLOOD PRESSURE: 141 MMHG | HEART RATE: 78 BPM | RESPIRATION RATE: 16 BRPM

## 2018-03-13 VITALS — HEART RATE: 80 BPM

## 2018-03-13 VITALS — OXYGEN SATURATION: 100 %

## 2018-03-13 VITALS
SYSTOLIC BLOOD PRESSURE: 128 MMHG | RESPIRATION RATE: 16 BRPM | HEART RATE: 76 BPM | DIASTOLIC BLOOD PRESSURE: 68 MMHG | OXYGEN SATURATION: 100 % | TEMPERATURE: 99.3 F

## 2018-03-13 VITALS — HEART RATE: 85 BPM

## 2018-03-13 VITALS — OXYGEN SATURATION: 97 %

## 2018-03-13 VITALS
RESPIRATION RATE: 16 BRPM | DIASTOLIC BLOOD PRESSURE: 90 MMHG | TEMPERATURE: 97.5 F | HEART RATE: 90 BPM | SYSTOLIC BLOOD PRESSURE: 166 MMHG | OXYGEN SATURATION: 100 %

## 2018-03-13 VITALS — HEART RATE: 78 BPM

## 2018-03-13 VITALS — HEART RATE: 92 BPM

## 2018-03-13 LAB
ALBUMIN SERPL-MCNC: 2.3 GM/DL (ref 3.4–5)
ALP SERPL-CCNC: 67 U/L (ref 45–117)
ALT SERPL-CCNC: 13 U/L (ref 12–78)
AST SERPL-CCNC: 39 U/L (ref 15–37)
BASO STIPL BLD QL SMEAR: (no result)
BASOPHILS # BLD AUTO: 0 TH/MM3 (ref 0–0.2)
BASOPHILS NFR BLD: 0.4 % (ref 0–2)
BILIRUB SERPL-MCNC: 0.5 MG/DL (ref 0.2–1)
BUN SERPL-MCNC: 24 MG/DL (ref 7–18)
CALCIUM SERPL-MCNC: 8.3 MG/DL (ref 8.5–10.1)
CHLORIDE SERPL-SCNC: 118 MEQ/L (ref 98–107)
CREAT SERPL-MCNC: 0.77 MG/DL (ref 0.6–1.3)
EOSINOPHIL # BLD: 0.3 TH/MM3 (ref 0–0.4)
EOSINOPHIL NFR BLD: 2.7 % (ref 0–4)
ERYTHROCYTE [DISTWIDTH] IN BLOOD BY AUTOMATED COUNT: 18.6 % (ref 11.6–17.2)
GFR SERPLBLD BASED ON 1.73 SQ M-ARVRAT: 101 ML/MIN (ref 89–?)
GLUCOSE SERPL-MCNC: 79 MG/DL (ref 74–106)
HCO3 BLD-SCNC: 25.3 MEQ/L (ref 21–32)
HCT VFR BLD CALC: 26.3 % (ref 39–51)
HELMET CELLS BLD QL SMEAR: (no result)
HGB BLD-MCNC: 9.1 GM/DL (ref 13–17)
LYMPHOCYTES # BLD AUTO: 1.3 TH/MM3 (ref 1–4.8)
LYMPHOCYTES NFR BLD AUTO: 11.8 % (ref 9–44)
MCH RBC QN AUTO: 28.9 PG (ref 27–34)
MCHC RBC AUTO-ENTMCNC: 34.6 % (ref 32–36)
MCV RBC AUTO: 83.5 FL (ref 80–100)
MONOCYTE #: 1 TH/MM3 (ref 0–0.9)
MONOCYTES NFR BLD: 9.2 % (ref 0–8)
NEUTROPHILS # BLD AUTO: 8.2 TH/MM3 (ref 1.8–7.7)
NEUTROPHILS NFR BLD AUTO: 75.9 % (ref 16–70)
PLATELET # BLD: 100 TH/MM3 (ref 150–450)
PMV BLD AUTO: 8.6 FL (ref 7–11)
PROT SERPL-MCNC: 5.6 GM/DL (ref 6.4–8.2)
RBC # BLD AUTO: 3.15 MIL/MM3 (ref 4.5–5.9)
SODIUM SERPL-SCNC: 153 MEQ/L (ref 136–145)
WBC # BLD AUTO: 10.8 TH/MM3 (ref 4–11)

## 2018-03-13 PROCEDURE — 0PSH04Z REPOSITION RIGHT RADIUS WITH INTERNAL FIXATION DEVICE, OPEN APPROACH: ICD-10-PCS | Performed by: ORTHOPAEDIC SURGERY

## 2018-03-13 PROCEDURE — 0PSK04Z REPOSITION RIGHT ULNA WITH INTERNAL FIXATION DEVICE, OPEN APPROACH: ICD-10-PCS | Performed by: ORTHOPAEDIC SURGERY

## 2018-03-13 RX ADMIN — PROPOFOL PRN MLS/HR: 10 INJECTION, EMULSION INTRAVENOUS at 13:43

## 2018-03-13 RX ADMIN — Medication PRN MLS/HR: at 21:55

## 2018-03-13 RX ADMIN — CHLORHEXIDINE GLUCONATE 0.12% ORAL RINSE SCH ML: 1.2 LIQUID ORAL at 20:00

## 2018-03-13 RX ADMIN — Medication PRN MLS/HR: at 00:45

## 2018-03-13 RX ADMIN — POTASSIUM CHLORIDE PRN MLS/HR: 400 INJECTION, SOLUTION INTRAVENOUS at 05:08

## 2018-03-13 RX ADMIN — CHLORHEXIDINE GLUCONATE SCH PACK: 500 CLOTH TOPICAL at 04:00

## 2018-03-13 RX ADMIN — Medication PRN MLS/HR: at 09:42

## 2018-03-13 RX ADMIN — CHLORHEXIDINE GLUCONATE 0.12% ORAL RINSE SCH ML: 1.2 LIQUID ORAL at 09:44

## 2018-03-13 RX ADMIN — SODIUM CHLORIDE SCH MLS/HR: 900 INJECTION INTRAVENOUS at 12:37

## 2018-03-13 RX ADMIN — PANTOPRAZOLE SODIUM SCH MG: 40 INJECTION, POWDER, FOR SOLUTION INTRAVENOUS at 20:07

## 2018-03-13 RX ADMIN — CEFAZOLIN SODIUM SCH MLS/HR: 2 SOLUTION INTRAVENOUS at 23:00

## 2018-03-13 RX ADMIN — DOCUSATE SODIUM SCH MG: 100 CAPSULE, LIQUID FILLED ORAL at 08:57

## 2018-03-13 RX ADMIN — PROPOFOL PRN MLS/HR: 10 INJECTION, EMULSION INTRAVENOUS at 09:43

## 2018-03-13 RX ADMIN — FUROSEMIDE SCH MG: 10 INJECTION, SOLUTION INTRAMUSCULAR; INTRAVENOUS at 09:43

## 2018-03-13 RX ADMIN — BACITRACIN SCH APPLIC: 500 OINTMENT TOPICAL at 10:36

## 2018-03-13 RX ADMIN — PROPOFOL PRN MLS/HR: 10 INJECTION, EMULSION INTRAVENOUS at 03:52

## 2018-03-13 RX ADMIN — BACITRACIN SCH APPLIC: 500 OINTMENT TOPICAL at 20:08

## 2018-03-13 RX ADMIN — PROPOFOL PRN MLS/HR: 10 INJECTION, EMULSION INTRAVENOUS at 22:59

## 2018-03-13 RX ADMIN — MAGNESIUM HYDROXIDE SCH ML: 400 SUSPENSION ORAL at 08:57

## 2018-03-13 RX ADMIN — LEVETIRACETAM SCH MLS/HR: 100 INJECTION, SOLUTION, CONCENTRATE INTRAVENOUS at 09:43

## 2018-03-13 RX ADMIN — MAGNESIUM HYDROXIDE SCH ML: 400 SUSPENSION ORAL at 20:08

## 2018-03-13 RX ADMIN — LEVETIRACETAM SCH MLS/HR: 100 INJECTION, SOLUTION, CONCENTRATE INTRAVENOUS at 20:08

## 2018-03-13 RX ADMIN — DOCUSATE SODIUM SCH MG: 100 CAPSULE, LIQUID FILLED ORAL at 20:08
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
## 2018-03-13 NOTE — PD.ORT.PN
Subjective


Subjective Remarks


s/p MCA


right open distal femur s/p exfix


right tibial plateau


right BBFA


left humerus





Objective


Vitals





Vital Signs








  Date Time  Temp Pulse Resp B/P (MAP) Pulse Ox O2 Delivery O2 Flow Rate FiO2


 


3/13/18 06:00  78      


 


3/13/18 04:31     100   45


 


3/13/18 04:00 99.1 80 16 141/79 (99) 100   


 


3/13/18 04:00  78      


 


3/13/18 04:00        45


 


3/13/18 02:00  78      


 


3/13/18 00:43     98   45


 


3/13/18 00:00 99.3 86 16 155/78 (103) 98   


 


3/13/18 00:00        45


 


3/13/18 00:00  81      


 


3/12/18 22:00  82      


 


3/12/18 21:53     98   45


 


3/12/18 21:40     96   45


 


3/12/18 20:00  76      


 


3/12/18 20:00        45


 


3/12/18 20:00 99.0 76 16 142/78 (99) 97   


 


3/12/18 19:00     97 Mechanical Ventilator  50


 


3/12/18 18:00  74  132/71 (91)    


 


3/12/18 18:00  76      


 


3/12/18 16:32     96   45


 


3/12/18 16:00 97.8 74 16 132/71 (91) 97   


 


3/12/18 16:00  73      


 


3/12/18 14:00  78      


 


3/12/18 12:00  75      


 


3/12/18 12:00 98.7 82 16 134/75 (94) 96   


 


3/12/18 10:00  75      


 


3/12/18 08:39     97   50


 


3/12/18 08:39     97   50


 


3/12/18 08:00 98.7 77 16 130/69 (89) 99   


 


3/12/18 08:00  77      














I/O      


 


 3/12/18 3/12/18 3/12/18 3/13/18 3/13/18 3/13/18





 07:00 15:00 23:00 07:00 15:00 23:00


 


Intake Total 619 ml 50 ml 963 ml 100 ml  


 


Output Total 675 ml  2200 ml 950 ml  


 


Balance -56 ml 50 ml -1237 ml -850 ml  


 


      


 


Intake IV Total 619 ml 50 ml 963 ml 100 ml  


 


Output Urine Total 675 ml  2200 ml 950 ml  


 


# Bowel Movements   0 1  








Result Diagram:  


3/13/18 0030                                                                   

             3/13/18 0030





Imaging





Last 24 hours Impressions








Chest X-Ray 3/9/18 0000 Signed





Impressions: 





 Service Date/Time:  Friday, March 9, 2018 02:33 - CONCLUSION:  Modest 

worsening 





 left base consolidation and with a probable tiny left pleural effusion 





 developing.     Jose Enrique Chiu MD 


 


Pelvis X-Ray 3/8/18 1941 Signed





Impressions: 





 Service Date/Time:  Thursday, March 8, 2018 19:39 - CONCLUSION:  No gross 





 abnormality seen on this limited exam.     Fredy Marquez MD 


 


Maxillofacial CT 3/8/18 1941 Signed





Impressions: 





 Service Date/Time:  Thursday, March 8, 2018 19:45 - CONCLUSION:  1. Bilateral 





 LeFort III facial fractures with significant impaction.. 2. Intracranial 





 displacement of the right superior orbital fracture and comminuted fracture of 





 the anterior paolo carlyn.     Fredy Marquez MD 


 


Head CT 3/8/18 1941 Signed





Impressions: 





 Service Date/Time:  Thursday, March 8, 2018 19:45 - CONCLUSION:  1. 

Intracranial 





 hemorrhage predominately on the right including subarachnoid (high convexity), 





 intraparenchymal (right occipital), subdural (right temporal) and epidural (

high 





 convexity right parietal). 2.    LeFort III facial bone fractures with crush 





 injury of the maxillary and ethmoid regions.     Fredy Marquez MD 


 


Chest X-Ray 3/8/18 1941 Signed





Impressions: 





 Service Date/Time:  Thursday, March 8, 2018 19:39 - CONCLUSION:  1. ET tube in 





 good position. 2. Bilateral rib fractures, nondisplaced and multilevel on the 





 right and with a displaced posterior 3rd rib fragment.     Fredy Marquez MD 


 


Chest CT 3/8/18 1941 Signed





Impressions: 





 Service Date/Time:  Thursday, March 8, 2018 20:17 - CONCLUSION:  1. Hairline 





 fracture the superior endplate of T7 with concentric paraspinal hematoma 





 extending 7 cm superior/inferior. 2. Multiple nondisplaced fractures of the 





 posterolateral 7th and 10th ribs. 3. Comminuted fracture of the body of the 

left 





 scapula. 4. Hiatus hernia containing the gastric fundus. 5. Probable contusion 





 in the posterior segment right upper lobe and posterolateral left lower chest.

   





   Fredy Marquez MD 


 


Cervical Spine CT 3/8/18 1941 Signed





Impressions: 





 Service Date/Time:  Thursday, March 8, 2018 19:45 - CONCLUSION:  No evidence 

of 





 acute fracture or spondylolisthesis.     Fredy Marquez MD 


 


Abdomen/Pelvis CT 3/8/18 1941 Signed





Impressions: 





 Service Date/Time:  Thursday, March 8, 2018 20:17 - CONCLUSION:  1. Acute left 





 rib fractures and left transverse process fractures. 2. Hiatus hernia 

containing 





 the gastric fundus. 3. The solid and hollow organs of the abdomen/pelvis 

appear 





 grossly intact.     Fredy Marquez MD 








Objective Remarks


Pt laying in bed


Mech ventilation


VSS





RLE: External fixator in place with clean dry dressings. Intact distal pulses 

and good capillary refills


RUE: Splint, dressings clean and dry. Mild swelling into hand, +nvi


LUE: dressings clean and dry. Moderate swelling hand, +nvi





Assessment & Plan


Assessment and Plan


1) Right Open Distal Femur Fx and Right Tibial Plateau Fx status post external 

fixation, irrigation debridement and traumatic wound closure POD #5


3) Right Radius/Ulna Shaft Fxs


4) Left Distal 1/3 Humerus Fx





Pt on mechanical ventilation. 


RN states pt is currently NPO because they cannot place an NG tube. 


Continue cardiopulmonary support. 


Maintain splints upper extremity


Maintain external fixation with pin care twice a day. 


surgery today for upper extremities. femur surgery will wait til later date











Galindo Segura/[facility] PA Mar 13, 2018 07:44

## 2018-03-13 NOTE — RADRPT
EXAM DATE/TIME:  03/13/2018 18:14 

 

[facility] COMPARISON:     

FOREARM RIGHT (2VWS), March 08, 2018, 19:39.

 

                     

INDICATIONS :     

ORIF of the right forearm. 

                     

 

MEDICAL HISTORY :            

Non-responsive   

 

SURGICAL HISTORY :        

Non-responsive

 

ENCOUNTER:     

Subsequent                                        

 

ACUITY:     

4 - 6 days      

 

PAIN SCORE:     

Non-responsive.

 

LOCATION:     

Right upper extremity 

 

FINDINGS:     

Proximal shaft fractures of the right radius and ulna have undergone screw and plate fixation. Alignm
ent is near-anatomic. No acute complication demonstrated.

 

CONCLUSION:     

Expected radiographic appearance post screw and plate fixation of shaft fractures of the right radius
 and ulna.

 

 

 

 Jose Enrique Chiu MD on March 13, 2018 at 23:05           

Board Certified Radiologist.

 This report was verified electronically.
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
## 2018-03-13 NOTE — PD.OP
cc:   Arvind Augustine MD


__________________________________________________





Operative Report


Date of Surgery:  Mar 13, 2018


Preoperative Diagnosis:  


Comminuted right radial shaft fracture, comminuted right ulna shaft fracture, 

displaced right distal radius fracture


Postoperative Diagnosis:  


Procedure:


Open reduction internal fixation right radial shaft, open reduction internal 

fixation right ulnar shaft, open reduction internal fixation right distal radius


Anesthesia:


Gen.


Surgeon:


Arvind Augustine


Assistant(s):


Samuel Elizabeth PA-C


 


The surgical procedure was assisted by my physician assistant. My P.A. presence 

was necessary throughout this case for the manipulation and positioning of the 

surgical extremity. My P.A. was assisting me throughout the duration of this 

procedure. The skill set of a physician assistant was medically necessary to 

complete this procedure. During the surgical case the surgical tech was working 

at the back table and the physician assistant was directly assisting me.


Operation and Findings:


Implants used: ITS





Patient was seen and examined preoperatively.  Patient was found to have 

displaced right radius and ulna shaft fractures, and a displaced right distal 

radius fracture.  Informed consent was obtained and operative site was marked.  

Patient was brought to operating room and given IV sedation and general 

anesthesia.  Timeout procedure was performed.  Operative extremity was prepped 

and draped with alcohol followed by Hibiclens and draped in usual sterile 

fashion.  IV antibiotics were administered prior to incision.





Procedure began with a 5 inch incision over the subcutaneous border of the 

ulna.  Fascia was elevated off of the bone.  Fracture site was visualized.  

Fracture tenaculums were used to reduce fracture.  Fracture keyed into anatomic 

alignment.  A  ITS plate was placed across the fracture.  Plate was 

provisionally held to bone with K wires.  3.5 cortical screws were used to 

compress plate to bone.  Multiple screws were placed in each side of fracture.  

K wires were removed.  Fluoroscopy confirmed excellent alignment of fracture 

with well-placed hardware.  Incision was now closed with #1 Vicryl, 3-0 Vicryl, 

and staples.





Next attention was turned to the radius.  A 5 inch incision was made over the 

volar aspect of the forearm.  A standard volar approach was utilized.  The 

interval between the radial artery and superficial radial nerve was identified.

  Neurovascular structures were protected.  Soft tissue was elevated off the 

bone.  Fracture site was visualized.  Fracture fragments were carefully 

reduced.  Each fracture fragment keyed in anatomic alignment.  K wires were 

used to hold provisional fixation.  A ITS plate was contoured to fit the 

radius.  Plate was provisionally held with K wires.  3.5 cortical screws were 

used to compress plate to bone.  Multiple screws were placed in each side of 

fracture.  K wires were removed. 





Next attention was turned towards the right distal radius fracture.  A third 

incision was made over the volar aspect of the wrist.  A standard volar 

approach to the distal radius was utilized.  A 3 inch incision was made over 

the FCR tendon.  Tendon sheath was opened.  Pronator quadratus was elevated up.

  The fracture site was now visualized.  The fracture did have intra-articular 

extension.  Traction was applied.  The articular surface was reduced.  Fracture 

fragments were manipulated to achieve excellent reduction.  K wires were used 

to hold provisional fixation.  Fluoroscopy confirmed appropriate alignment of 

fracture.  A variable angle distal radius plate was selected.  Plate was 

provisionally fixed to bone with K wires.  2.7 and 2.4 cortical screws were 

used to compress plate to bone.  Fluoroscopy confirmed appropriate alignment of 

fracture with well-placed hardware.  Multiple 2.4 locking screws were now 

placed distally.  Screws were predrilled and measured for appropriate length.  

2 additional screws were placed into the shaft.  K wires were removed.  Final 

fluoroscopy revealed excellent of fracture with well-placed hardware.  The 

wound was thoroughly irrigated with sterile saline.  Subcutaneous tissue was 

closed with 3-0 Vicryl and skin was closed with 3-0 nylon.  Sterile dressings 

were applied with Xeroform, 4 x 4, soft roll, and a well padded volar splint.  

Patient was transferred to intensive care in stable condition.  Forearm 

compartments were soft and compressible.











Arvind Augustine MD Mar 13, 2018 18:45

## 2018-03-13 NOTE — RADRPT
EXAM DATE/TIME:  03/13/2018 18:14 

 

[facility] COMPARISON:     

WRIST RIGHT COMPLETE ([id number]), March 10, 2018, 8:24.

 

                     

INDICATIONS :     

ORIF of the right wrist. 

                     

 

MEDICAL HISTORY :            

Non-responsive   

 

SURGICAL HISTORY :        

Non-responsive

 

ENCOUNTER:     

Subsequent                                        

 

ACUITY:     

4 - 6 days      

 

PAIN SCORE:     

Non-responsive.

 

LOCATION:     

Right upper extremity 

 

FINDINGS:     

Comminuted fracture in the metaphyseal region of the right radius has undergone open reduction intern
al fixation. Fixation consists of a volar plate and multiple screws. Alignment is near-anatomic.

 

CONCLUSION:     

Interim screw and plate fixation of distal radial fracture in near-anatomic alignment. No acute compl
ication demonstrated.

 

 

 

 Jose Enrique Chiu MD on March 13, 2018 at 23:06           

Board Certified Radiologist.

 This report was verified electronically.
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
## 2018-03-13 NOTE — HHI.NSPN
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 3/14/18 at 08:47;  Status DC


Hydralazine HCl (Apresoline Inj) 20 mg Q2H  PRN IV PUSH SBP > 160 Last 

administered on 3/14/18at 08:53;  Start 3/14/18 at 09:00;  Stop 3/14/18 at 09:38

;  Status DC


Hydralazine HCl (Apresoline Inj) 20 mg Q2H IV PUSH  Last administered on 3/14/

18at 11:11;  Start 3/14/18 at 11:00;  Stop 3/14/18 at 12:35;  Status DC


Rocuronium Bromide (Zemuron Inj) 50 mg BOLUS  ONCE IV ;  Start 3/14/18 at 12:00

;  Stop 3/14/18 at 12:01;  Status DC


Hydralazine HCl (Apresoline Inj) 20 mg Q6H  PRN IV PUSH HTN;  Start 3/14/18 at 

12:45


Sodium Chloride 1,000 ml @  250 mls/hr BOLUS  ONCE IV  Last administered on 3/14

/18at 13:36;  Start 3/14/18 at 12:45;  Stop 3/14/18 at 16:44


 (Hua Cruz MD)





Plan


Plan Remarks


continue weaning off sedation as tolerated and follow neurological examination


continue critical and trauma management


ORMFS for facial fractures


Dr. Cruz requesting neurological evaluation for prognosis


Nonchemical DVT prophylaxis in view of acute intracranial hemorrhage


Protonix for GI prophylaxis


Keppra for seizure prophylaxis


 (Meryl Carrera)





Attending Statement


Right sided subarachnoid hemorrhage, occipital intraparenchymal, temporal 

subdural and parietal epidural hemorrhages, Continue neuro checks in a serial 

fashion.    





Bilateral LeFort III facial fractures, and Intracranial displacement of the 

right superior orbital fracture and comminuted fracture of the anterior paolo 

carlyn. these are critical injuries.  Defer to dr javier





Left 7-10 rib fractures. Continue narcotic analgesics for pain control





Fracture the superior endplate of T7 with paraspinal hematoma extending 7 cm 

superior/inferior, nonsurgical management.  Bracing the cervical spine with a 

[place] collar





Nondisplaced transverse process fractures left L1-L3.  Nonoperative treatment.  

Narcotic analgesics for pain control





Comminuted fracture of the body of the left scapula.  Consultation to orthopedic





Fracture of the distal right proximal tibial fracture with probable comminution

, significantly displaced comminuted fracture of the right distal femur. 

irrigation as an placement of an external fixator





Comminuted and angulated fracture of the distal left humerus, displaced and 

comminuted fractures of the proximal right radius and ulna and transverse 

fracture of the distal radial metaphysis.  Will need surgical intervenmtion 

when hemydynamically stable





Contusion right upper lobe and left lower lobe of the lung. Defer to trauma 

surgeon


Aggressive pulmonary toilette, nasotracheal suction, and breathing treatments 

with nebulizers.





Nutrition. Start tube feedings





Renal. monitor closely urine output, BUN and creatinine





Endocrine. Monitor serial Acu checks and SSI as needed in detail





ID monitor for signs of infection





Protonix for stress ulcer prophylaxis














 Point Value = 1          Point Value = 2  Point Value = 3  Point Value = 5


 


Age 41-60


Minor surgery


BMI > 25 kg/m2


Swollen legs


Varicose veins


Pregnancy or postpartum


History of unexplained or recurrent


   spontaneous 


Oral contraceptives or hormone


   replacement


Sepsis (< 1 month)


Serious lung disease, including


   pneumonia (< 1 month)


Abnormal pulmonary function


Acute myocardial infarction


Congestive heart failure (< 1 month)


History of inflammatory bowel disease


Medical patient at bed rest Age 61-74


Arthroscopic surgery


Major open surgery (> 45 min)


Laparoscopic surgery (> 45 min)


Malignancy


Confined to bed (> 72 hours)


Immobilizing plaster cast


Central venous access Age >= 75


History of VTE


Family history of VTE


Factor V Leiden


Prothrombin 76546E


Lupus anticoagulant


Anticardiolipin antibodies


Elevated serum homocysteine


Heparin-induced thrombocytopenia


Other congenital or acquired


   thrombophilia Stroke (< 1 month)


Elective arthroplasty


Hip, pelvis, or leg fracture


Acute spinal cord injury (< 1 month)











Candido saravia and SCD's for DVT prophylaxis. 





Further recommendations will depend on his clinical evaluation and radiological 

studies


 (Hua Cruz MD)











Meryl Carrera Mar 13, 2018 14:54


Hua Cruz MD Mar 14, 2018 13:58
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
## 2018-03-14 VITALS
DIASTOLIC BLOOD PRESSURE: 80 MMHG | OXYGEN SATURATION: 100 % | RESPIRATION RATE: 16 BRPM | TEMPERATURE: 99 F | HEART RATE: 86 BPM | SYSTOLIC BLOOD PRESSURE: 158 MMHG

## 2018-03-14 VITALS
TEMPERATURE: 99.3 F | OXYGEN SATURATION: 100 % | RESPIRATION RATE: 16 BRPM | DIASTOLIC BLOOD PRESSURE: 82 MMHG | SYSTOLIC BLOOD PRESSURE: 185 MMHG | HEART RATE: 86 BPM

## 2018-03-14 VITALS — OXYGEN SATURATION: 100 %

## 2018-03-14 VITALS
SYSTOLIC BLOOD PRESSURE: 164 MMHG | HEART RATE: 80 BPM | DIASTOLIC BLOOD PRESSURE: 74 MMHG | OXYGEN SATURATION: 100 % | TEMPERATURE: 99.3 F | RESPIRATION RATE: 16 BRPM

## 2018-03-14 VITALS
HEART RATE: 94 BPM | SYSTOLIC BLOOD PRESSURE: 154 MMHG | OXYGEN SATURATION: 97 % | TEMPERATURE: 100 F | DIASTOLIC BLOOD PRESSURE: 68 MMHG | RESPIRATION RATE: 18 BRPM

## 2018-03-14 VITALS
SYSTOLIC BLOOD PRESSURE: 133 MMHG | HEART RATE: 116 BPM | TEMPERATURE: 99.9 F | DIASTOLIC BLOOD PRESSURE: 72 MMHG | RESPIRATION RATE: 19 BRPM | OXYGEN SATURATION: 95 %

## 2018-03-14 VITALS
RESPIRATION RATE: 16 BRPM | HEART RATE: 92 BPM | SYSTOLIC BLOOD PRESSURE: 164 MMHG | OXYGEN SATURATION: 98 % | DIASTOLIC BLOOD PRESSURE: 74 MMHG | TEMPERATURE: 99.9 F

## 2018-03-14 VITALS — HEART RATE: 98 BPM

## 2018-03-14 VITALS — HEART RATE: 99 BPM

## 2018-03-14 VITALS — OXYGEN SATURATION: 96 %

## 2018-03-14 VITALS — HEART RATE: 84 BPM

## 2018-03-14 VITALS — OXYGEN SATURATION: 97 %

## 2018-03-14 VITALS — HEART RATE: 114 BPM

## 2018-03-14 VITALS — HEART RATE: 100 BPM

## 2018-03-14 VITALS — OXYGEN SATURATION: 99 %

## 2018-03-14 VITALS — HEART RATE: 82 BPM

## 2018-03-14 LAB
ALBUMIN SERPL-MCNC: 2.3 GM/DL (ref 3.4–5)
ALP SERPL-CCNC: 74 U/L (ref 45–117)
ALT SERPL-CCNC: 15 U/L (ref 12–78)
AST SERPL-CCNC: 40 U/L (ref 15–37)
BASOPHILS # BLD AUTO: 0 TH/MM3 (ref 0–0.2)
BASOPHILS NFR BLD: 0.1 % (ref 0–2)
BILIRUB SERPL-MCNC: 0.6 MG/DL (ref 0.2–1)
BUN SERPL-MCNC: 27 MG/DL (ref 7–18)
CALCIUM SERPL-MCNC: 8.4 MG/DL (ref 8.5–10.1)
CHLORIDE SERPL-SCNC: 117 MEQ/L (ref 98–107)
CREAT SERPL-MCNC: 0.77 MG/DL (ref 0.6–1.3)
EOSINOPHIL # BLD: 0.2 TH/MM3 (ref 0–0.4)
EOSINOPHIL NFR BLD: 1.8 % (ref 0–4)
ERYTHROCYTE [DISTWIDTH] IN BLOOD BY AUTOMATED COUNT: 18.3 % (ref 11.6–17.2)
GFR SERPLBLD BASED ON 1.73 SQ M-ARVRAT: 101 ML/MIN (ref 89–?)
GLUCOSE SERPL-MCNC: 93 MG/DL (ref 74–106)
HCO3 BLD-SCNC: 24.9 MEQ/L (ref 21–32)
HCT VFR BLD CALC: 27.6 % (ref 39–51)
HGB BLD-MCNC: 9.2 GM/DL (ref 13–17)
LYMPHOCYTES # BLD AUTO: 1.2 TH/MM3 (ref 1–4.8)
LYMPHOCYTES NFR BLD AUTO: 10.1 % (ref 9–44)
MCH RBC QN AUTO: 28 PG (ref 27–34)
MCHC RBC AUTO-ENTMCNC: 33.6 % (ref 32–36)
MCV RBC AUTO: 83.4 FL (ref 80–100)
MONOCYTE #: 1.1 TH/MM3 (ref 0–0.9)
MONOCYTES NFR BLD: 9 % (ref 0–8)
NEUTROPHILS # BLD AUTO: 9.3 TH/MM3 (ref 1.8–7.7)
NEUTROPHILS NFR BLD AUTO: 79 % (ref 16–70)
PLATELET # BLD: 146 TH/MM3 (ref 150–450)
PMV BLD AUTO: 8 FL (ref 7–11)
PROT SERPL-MCNC: 6 GM/DL (ref 6.4–8.2)
RBC # BLD AUTO: 3.31 MIL/MM3 (ref 4.5–5.9)
SODIUM SERPL-SCNC: 153 MEQ/L (ref 136–145)
WBC # BLD AUTO: 11.8 TH/MM3 (ref 4–11)

## 2018-03-14 RX ADMIN — DOCUSATE SODIUM SCH MG: 100 CAPSULE, LIQUID FILLED ORAL at 22:07

## 2018-03-14 RX ADMIN — CEFAZOLIN SODIUM SCH MLS/HR: 2 SOLUTION INTRAVENOUS at 16:18

## 2018-03-14 RX ADMIN — BACITRACIN SCH APPLIC: 500 OINTMENT TOPICAL at 21:00

## 2018-03-14 RX ADMIN — CEFAZOLIN SODIUM SCH MLS/HR: 2 SOLUTION INTRAVENOUS at 06:19

## 2018-03-14 RX ADMIN — MAGNESIUM HYDROXIDE SCH ML: 400 SUSPENSION ORAL at 08:16

## 2018-03-14 RX ADMIN — LEVETIRACETAM SCH MLS/HR: 100 INJECTION, SOLUTION, CONCENTRATE INTRAVENOUS at 22:06

## 2018-03-14 RX ADMIN — ENALAPRILAT PRN MG: 1.25 INJECTION INTRAVENOUS at 22:07

## 2018-03-14 RX ADMIN — MAGNESIUM HYDROXIDE SCH ML: 400 SUSPENSION ORAL at 22:07

## 2018-03-14 RX ADMIN — CHLORHEXIDINE GLUCONATE 0.12% ORAL RINSE SCH ML: 1.2 LIQUID ORAL at 08:16

## 2018-03-14 RX ADMIN — Medication PRN MLS/HR: at 07:44

## 2018-03-14 RX ADMIN — Medication PRN MLS/HR: at 16:22

## 2018-03-14 RX ADMIN — DOCUSATE SODIUM SCH MG: 100 CAPSULE, LIQUID FILLED ORAL at 08:16

## 2018-03-14 RX ADMIN — FUROSEMIDE SCH MG: 10 INJECTION, SOLUTION INTRAMUSCULAR; INTRAVENOUS at 08:16

## 2018-03-14 RX ADMIN — SODIUM CHLORIDE SCH MLS/HR: 900 INJECTION INTRAVENOUS at 05:33

## 2018-03-14 RX ADMIN — PANTOPRAZOLE SODIUM SCH MG: 40 INJECTION, POWDER, FOR SOLUTION INTRAVENOUS at 22:07

## 2018-03-14 RX ADMIN — CHLORHEXIDINE GLUCONATE 0.12% ORAL RINSE SCH ML: 1.2 LIQUID ORAL at 20:00

## 2018-03-14 RX ADMIN — BACITRACIN SCH APPLIC: 500 OINTMENT TOPICAL at 10:07

## 2018-03-14 RX ADMIN — LEVETIRACETAM SCH MLS/HR: 100 INJECTION, SOLUTION, CONCENTRATE INTRAVENOUS at 08:16

## 2018-03-14 RX ADMIN — CHLORHEXIDINE GLUCONATE SCH PACK: 500 CLOTH TOPICAL at 04:00

## 2018-03-14 RX ADMIN — ENALAPRILAT PRN MG: 1.25 INJECTION INTRAVENOUS at 04:04
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
## 2018-03-14 NOTE — HHI.HCPN
Reason for visit





   a.  To assist with evaluation and management of symptoms including: pain, 

dyspnea


   b.  To assist medical decision maker(s) with: better understanding of 

current medical conditions; weighing benefits/burdens of medical treatment 

options; making        


        medical treatment decisions.


.





Subjective/Interval History


INTERVAL NOTE:


The patient is off sedation except is still on fentanyl since his blood 

pressure rises if the fentanyl was turned down.  He remains unresponsive.





Chest x-ray continues to indicate bilateral infiltrates.





Discussed with Dr. Cruz and with Dr. KENNEDY and with Dr. Christie.  All agree that 

the prognosis is poor, "100% mortality" ..


.


Family/friend interactions


Met with patient's wife and her sister sam Sadler LCSW; they understand that 

the overall prognosis is quite poor, and that long-term debility or nursing 

home existence is definitely not something the patient would want.  I spoke 

with the patient's daughter Laura by telephone and she confirmed that all of 

the patient's children agree that he would not want to be maintained on life 

support when the prognosis is poor.


.





Objective





Vital Signs








  Date Time  Temp Pulse Resp B/P (MAP) Pulse Ox O2 Delivery O2 Flow Rate FiO2


 


3/14/18 10:08     96   45


 


3/14/18 10:00  114      


 


3/14/18 08:00 99.3 80 16 164/74 (104) 100   


 


3/14/18 08:00  80      


 


3/14/18 08:00        45


 


3/14/18 07:00     100 Mechanical Ventilator  45


 


3/14/18 06:00  82      


 


3/14/18 05:33  91  155/70    


 


3/14/18 05:30     100   45


 


3/14/18 04:00  86      


 


3/14/18 04:00 99.3 86 16 185/82 (116) 100   


 


3/14/18 04:00        45


 


3/14/18 02:00  84      


 


3/14/18 01:08     100   45


 


3/14/18 00:00 99.0 86 16 158/80 (106) 100   


 


3/14/18 00:00  86      


 


3/14/18 00:00        45


 


3/13/18 22:00  85      


 


3/13/18 20:00        45


 


3/13/18 20:00 97.5 90 16 166/90 (115) 100   


 


3/13/18 20:00  79      


 


3/13/18 19:25     97   45


 


3/13/18 19:00     100 Mechanical Ventilator  45


 


3/13/18 16:00 99.1 80 16 105/95 (98) 100   


 


3/13/18 16:00        45


 


3/13/18 16:00  80      


 


3/13/18 14:00  80      


 


3/13/18 12:37  84  135/68    


 


3/13/18 12:35     100   45


 


3/13/18 12:00 99.0 84 16 144/72 (96) 99   


 


3/13/18 12:00        45


 


3/13/18 12:00  84      














Intake & Output  


 


 3/14/18 3/14/18





 07:00 19:00


 


Intake Total 500 ml 


 


Output Total 950 ml 


 


Balance -450 ml 


 


  


 


Intake IV Total 500 ml 


 


Output Urine Total 950 ml 


 


# Bowel Movements 0 








Physical Exam


CONSTITUTIONAL/GENERAL: This is an adequately nourished, male patient currently 

intubated on mechanical ventilation


TUBES/LINES/DRAINS: CVL, left femoral arterial line, Cuellar catheter, SCDs, ETT


SKIN: No jaundice, rashes, or lesions. Ecchymoses on upper extremities. No 

wounds seen anteriorly. Skin temperature appropriate. Not diaphoretic. 


EYES: Unable to examine eyes/pupils secondary to periorbital edema


ENT: Significant bruising and swelling on face.  Multiple lacerations. 


NECK: C-collar in place.


CARDIOVASCULAR: Regular rate and rhythm without murmurs, gallops, or rubs. No 

JVD. Peripheral pulses symmetric.


RESPIRATORY/CHEST: Orotracheally intubated on mechanical ventilation.  Coarse 

air exchange. Rhonchi


GASTROINTESTINAL: Abdomen soft, non-tender, nondistended.  Bowel sounds present.


GENITOURINARY: Without palpable bladder distension. Cuellar catheter in place. + 

Hematuria


MUSCULOSKELETAL: Left and right upper extremities with splints in place. Right 

lower extremity long splint in place, ex-fix.  Peripheral pulses are difficult 

to palpate


NEUROLOGICAL: unresponsive, on Fentanyl


PSYCHIATRIC: Unable to assess secondary to clinical condition and sedation.


.





Diagnostic Tests


Laboratory





Laboratory Tests








Test


  3/12/18


04:00 3/12/18


05:17 3/13/18


00:30 3/13/18


05:27


 


White Blood Count


  10.6 TH/MM3


(4.0-11.0) 


  10.8 TH/MM3


(4.0-11.0) 


 


 


Red Blood Count


  3.03 MIL/MM3


(4.50-5.90) 


  3.15 MIL/MM3


(4.50-5.90) 


 


 


Hemoglobin


  8.8 GM/DL


(13.0-17.0) 


  9.1 GM/DL


(13.0-17.0) 


 


 


Hematocrit


  25.3 %


(39.0-51.0) 


  26.3 %


(39.0-51.0) 


 


 


Mean Corpuscular Volume


  83.5 FL


(80.0-100.0) 


  83.5 FL


(80.0-100.0) 


 


 


Mean Corpuscular Hemoglobin


  28.9 PG


(27.0-34.0) 


  28.9 PG


(27.0-34.0) 


 


 


Mean Corpuscular Hemoglobin


Concent 34.6 %


(32.0-36.0) 


  34.6 %


(32.0-36.0) 


 


 


Red Cell Distribution Width


  18.9 %


(11.6-17.2) 


  18.6 %


(11.6-17.2) 


 


 


Platelet Count


  67 TH/MM3


(150-450) 


  100 TH/MM3


(150-450) 


 


 


Mean Platelet Volume


  8.2 FL


(7.0-11.0) 


  8.6 FL


(7.0-11.0) 


 


 


Neutrophils (%) (Auto)


  82.1 %


(16.0-70.0) 


  75.9 %


(16.0-70.0) 


 


 


Lymphocytes (%) (Auto)


  9.5 %


(9.0-44.0) 


  11.8 %


(9.0-44.0) 


 


 


Monocytes (%) (Auto)


  7.7 %


(0.0-8.0) 


  9.2 %


(0.0-8.0) 


 


 


Eosinophils (%) (Auto)


  0.4 %


(0.0-4.0) 


  2.7 %


(0.0-4.0) 


 


 


Basophils (%) (Auto)


  0.3 %


(0.0-2.0) 


  0.4 %


(0.0-2.0) 


 


 


Neutrophils # (Auto)


  8.7 TH/MM3


(1.8-7.7) 


  8.2 TH/MM3


(1.8-7.7) 


 


 


Lymphocytes # (Auto)


  1.0 TH/MM3


(1.0-4.8) 


  1.3 TH/MM3


(1.0-4.8) 


 


 


Monocytes # (Auto)


  0.8 TH/MM3


(0-0.9) 


  1.0 TH/MM3


(0-0.9) 


 


 


Eosinophils # (Auto)


  0.0 TH/MM3


(0-0.4) 


  0.3 TH/MM3


(0-0.4) 


 


 


Basophils # (Auto)


  0.0 TH/MM3


(0-0.2) 


  0.0 TH/MM3


(0-0.2) 


 


 


CBC Comment AUTO DIFF   AUTO DIFF  


 


Differential Comment


  AUTO DIFF


CONFIRMED 


  AUTO DIFF


CONFIRMED 


 


 


Platelet Estimate LOW (NORMAL)   LOW (NORMAL)  


 


Platelet Morphology Comment


  NORMAL


(NORMAL) 


  NORMAL


(NORMAL) 


 


 


Blood Urea Nitrogen


  19 MG/DL


(7-18) 


  24 MG/DL


(7-18) 


 


 


Creatinine


  0.88 MG/DL


(0.60-1.30) 


  0.77 MG/DL


(0.60-1.30) 


 


 


Random Glucose


  91 MG/DL


() 


  79 MG/DL


() 


 


 


Total Protein


  5.3 GM/DL


(6.4-8.2) 


  5.6 GM/DL


(6.4-8.2) 


 


 


Albumin


  2.2 GM/DL


(3.4-5.0) 


  2.3 GM/DL


(3.4-5.0) 


 


 


Calcium Level


  7.2 MG/DL


(8.5-10.1) 


  8.3 MG/DL


(8.5-10.1) 


 


 


Alkaline Phosphatase


  61 U/L


() 


  67 U/L


() 


 


 


Aspartate Amino Transf


(AST/SGOT) 41 U/L (15-37) 


  


  39 U/L (15-37) 


  


 


 


Alanine Aminotransferase


(ALT/SGPT) 15 U/L (12-78) 


  


  13 U/L (12-78) 


  


 


 


Total Bilirubin


  0.4 MG/DL


(0.2-1.0) 


  0.5 MG/DL


(0.2-1.0) 


 


 


Sodium Level


  154 MEQ/L


(136-145) 


  153 MEQ/L


(136-145) 


 


 


Potassium Level


  3.4 MEQ/L


(3.5-5.1) 


  3.5 MEQ/L


(3.5-5.1) 


 


 


Chloride Level


  123 MEQ/L


() 


  118 MEQ/L


() 


 


 


Carbon Dioxide Level


  24.6 MEQ/L


(21.0-32.0) 


  25.3 MEQ/L


(21.0-32.0) 


 


 


Anion Gap


  6 MEQ/L (5-15) 


  


  10 MEQ/L


(5-15) 


 


 


Estimat Glomerular Filtration


Rate 86 ML/MIN


(>89) 


  101 ML/MIN


(>89) 


 


 


Protein Corrected Calcium


  8.2 MG/DL


(8.5-10.1) 


  


  


 


 


Blood Gas Puncture Site  ART LINE   LT FEMORAL 


 


Blood Gas Patient Temperature  98.6   98.6 


 


Blood Gas HCO3


  


  21 mmol/L


(22-26) 


  21 mmol/L


(22-26)


 


Blood Gas Base Excess


  


  -3.8 mmol/L


(-2-2) 


  -2.9 mmol/L


(-2-2)


 


Blood Gas Oxygen Saturation  95 % ()   96 % () 


 


Arterial Blood pH


  


  7.36


(7.380-7.420) 


  7.41


(7.380-7.420)


 


Arterial Blood Partial


Pressure CO2 


  38 mmHg


(38-42) 


  34 mmHg


(38-42)


 


Arterial Blood Partial


Pressure O2 


  92 mmHg


() 


  105 mmHg


()


 


Arterial Blood Oxygen Content


  


  16.7 Vol %


(12.0-20.0) 


  17.3 Vol %


(12.0-20.0)


 


Arterial Blood


Carboxyhemoglobin 


  0.9 % (0-4) 


  


  1.1 % (0-4) 


 


 


Arterial Blood Methemoglobin  1.0 % (0-2)   0.9 % (0-2) 


 


Blood Gas Hemoglobin


  


  12.4 G/DL


(12.0-16.0) 


  12.7 G/DL


(12.0-16.0)


 


Oxygen Delivery Device  VENT   VENTILATOR 


 


Blood Gas Ventilator Setting  SEE COMMENTS    


 


Blood Gas Inspired Oxygen  50 %   45 % 


 


Basophilic Stippling   FAINT (NORMAL)  


 


Helmet Cells   OCC (NORMAL)  


 


Test


  3/13/18


13:20 3/14/18


03:45 3/14/18


04:19 


 


 


Potassium Level


  3.7 MEQ/L


(3.5-5.1) 3.6 MEQ/L


(3.5-5.1) 


  


 


 


White Blood Count


  


  11.8 TH/MM3


(4.0-11.0) 


  


 


 


Red Blood Count


  


  3.31 MIL/MM3


(4.50-5.90) 


  


 


 


Hemoglobin


  


  9.2 GM/DL


(13.0-17.0) 


  


 


 


Hematocrit


  


  27.6 %


(39.0-51.0) 


  


 


 


Mean Corpuscular Volume


  


  83.4 FL


(80.0-100.0) 


  


 


 


Mean Corpuscular Hemoglobin


  


  28.0 PG


(27.0-34.0) 


  


 


 


Mean Corpuscular Hemoglobin


Concent 


  33.6 %


(32.0-36.0) 


  


 


 


Red Cell Distribution Width


  


  18.3 %


(11.6-17.2) 


  


 


 


Platelet Count


  


  146 TH/MM3


(150-450) 


  


 


 


Mean Platelet Volume


  


  8.0 FL


(7.0-11.0) 


  


 


 


Neutrophils (%) (Auto)


  


  79.0 %


(16.0-70.0) 


  


 


 


Lymphocytes (%) (Auto)


  


  10.1 %


(9.0-44.0) 


  


 


 


Monocytes (%) (Auto)


  


  9.0 %


(0.0-8.0) 


  


 


 


Eosinophils (%) (Auto)


  


  1.8 %


(0.0-4.0) 


  


 


 


Basophils (%) (Auto)


  


  0.1 %


(0.0-2.0) 


  


 


 


Neutrophils # (Auto)


  


  9.3 TH/MM3


(1.8-7.7) 


  


 


 


Lymphocytes # (Auto)


  


  1.2 TH/MM3


(1.0-4.8) 


  


 


 


Monocytes # (Auto)


  


  1.1 TH/MM3


(0-0.9) 


  


 


 


Eosinophils # (Auto)


  


  0.2 TH/MM3


(0-0.4) 


  


 


 


Basophils # (Auto)


  


  0.0 TH/MM3


(0-0.2) 


  


 


 


CBC Comment  AUTO DIFF   


 


Differential Comment


  


  AUTO DIFF


CONFIRMED 


  


 


 


Platelet Estimate


  


  NORMAL


(NORMAL) 


  


 


 


Platelet Morphology Comment


  


  NORMAL


(NORMAL) 


  


 


 


Blood Urea Nitrogen


  


  27 MG/DL


(7-18) 


  


 


 


Creatinine


  


  0.77 MG/DL


(0.60-1.30) 


  


 


 


Random Glucose


  


  93 MG/DL


() 


  


 


 


Total Protein


  


  6.0 GM/DL


(6.4-8.2) 


  


 


 


Albumin


  


  2.3 GM/DL


(3.4-5.0) 


  


 


 


Calcium Level


  


  8.4 MG/DL


(8.5-10.1) 


  


 


 


Alkaline Phosphatase


  


  74 U/L


() 


  


 


 


Aspartate Amino Transf


(AST/SGOT) 


  40 U/L (15-37) 


  


  


 


 


Alanine Aminotransferase


(ALT/SGPT) 


  15 U/L (12-78) 


  


  


 


 


Total Bilirubin


  


  0.6 MG/DL


(0.2-1.0) 


  


 


 


Sodium Level


  


  153 MEQ/L


(136-145) 


  


 


 


Chloride Level


  


  117 MEQ/L


() 


  


 


 


Carbon Dioxide Level


  


  24.9 MEQ/L


(21.0-32.0) 


  


 


 


Anion Gap


  


  11 MEQ/L


(5-15) 


  


 


 


Estimat Glomerular Filtration


Rate 


  101 ML/MIN


(>89) 


  


 


 


Blood Gas Puncture Site   ART LINE  


 


Blood Gas Patient Temperature   98.6  


 


Blood Gas HCO3


  


  


  21 mmol/L


(22-26) 


 


 


Blood Gas Base Excess


  


  


  -2.3 mmol/L


(-2-2) 


 


 


Blood Gas Oxygen Saturation   96 % ()  


 


Arterial Blood pH


  


  


  7.44


(7.380-7.420) 


 


 


Arterial Blood Partial


Pressure CO2 


  


  32 mmHg


(38-42) 


 


 


Arterial Blood Partial


Pressure O2 


  


  113 mmHg


() 


 


 


Arterial Blood Oxygen Content


  


  


  11.5 Vol %


(12.0-20.0) 


 


 


Arterial Blood


Carboxyhemoglobin 


  


  1.4 % (0-4) 


  


 


 


Arterial Blood Methemoglobin   1.2 % (0-2)  


 


Blood Gas Hemoglobin


  


  


  8.4 G/DL


(12.0-16.0) 


 


 


Oxygen Delivery Device   VENTILATOR  


 


Blood Gas Ventilator Setting   PRVC/AC  


 


Blood Gas Inspired Oxygen   45 %  








Result Diagram:  


3/14/18 0345                                                                   

             3/14/18 0345





Imaging





Last Impressions








Chest X-Ray 3/14/18 0600 Signed





Impressions: 





 Service Date/Time:  2018 05:01 - CONCLUSION:  Stable 





 bibasilar infiltrates.     Fredy Sadler Jr., MD 


 


Wrist X-Ray 3/13/18 0000 Signed





Impressions: 





 Service Date/Time:  2018 18:14 - CONCLUSION:  Interim screw 





 and plate fixation of distal radial fracture in near-anatomic alignment. No 





 acute complication demonstrated.     Jose Enrique Chiu MD 


 


Radius/Ulna X-Ray 3/13/18 0000 Signed





Impressions: 





 Service Date/Time:  2018 18:14 - CONCLUSION:  Expected 





 radiographic appearance post screw and plate fixation of shaft fractures of 

the 





 right radius and ulna.     Jose Enrique Chiu MD 


 


Head CT 3/9/18 0600 Signed





Impressions: 





 Service Date/Time:  2018 18:18 - CONCLUSION:  The extra-axial 





 fluid collection at the posterior highest convexity occipital region is less 





 prominent than on prior examination and has features on today's examination 





 suggesting subpleural blood.  Persistent subarachnoid hemorrhage in the right 





 hemisphere and new small area of subarachnoid hemorrhage posterior left high 





 parietal region.  Degree of swelling in the right hemisphere is similar to 





 prior.  There has been a significant increase the amount of right scalp 





 swelling, now extending only to the high convexities.     Fredy Marquez MD 


 


Neck CTA 3/9/18 0000 Signed





Impressions: 





 Service Date/Time:  2018 18:18 - CONCLUSION:  1. Bilateral 

mild 





 calcified plaque in the carotid bulbs with us to 50%% narrowing. 2. Symmetric 





 diameter to the vertebral arteries. 3. No evidence of dissection.     Fredy Marquez MD 


 


Knee X-Ray 3/9/18 0000 Signed





Impressions: 





 Service Date/Time:  2018 16:13 - CONCLUSION:  Limited images 

as 





 detailed above.     Fredy Sadler Jr., MD 


 


Pelvis X-Ray 3/8/18 1941 Signed





Impressions: 





 Service Date/Time:   19:39 - CONCLUSION:  No gross 





 abnormality seen on this limited exam.     Fredy Marquez MD 


 


Maxillofacial CT 3/8/18 1941 Signed





Impressions: 





 Service Date/Time:   19:45 - CONCLUSION:  1. Bilateral 





 LeFort III facial fractures with significant impaction.. 2. Intracranial 





 displacement of the right superior orbital fracture and comminuted fracture of 





 the anterior paolo carlyn.     Fredy Marquez MD 


 


Chest CT 3/8/18 1941 Signed





Impressions: 





 Service Date/Time:   20:17 - CONCLUSION:  1. Hairline 





 fracture the superior endplate of T7 with concentric paraspinal hematoma 





 extending 7 cm superior/inferior. 2. Multiple nondisplaced fractures of the 





 posterolateral 7th and 10th ribs. 3. Comminuted fracture of the body of the 

left 





 scapula. 4. Hiatus hernia containing the gastric fundus. 5. Probable contusion 





 in the posterior segment right upper lobe and posterolateral left lower chest.

   





   Fredy Marquez MD 


 


Cervical Spine CT 3/8/18 1941 Signed





Impressions: 





 Service Date/Time:   19:45 - CONCLUSION:  No evidence 

of 





 acute fracture or spondylolisthesis.     Fredy Marquez MD 


 


Abdomen/Pelvis CT 3/8/18 1941 Signed





Impressions: 





 Service Date/Time:   20:17 - CONCLUSION:  1. Acute left 





 rib fractures and left transverse process fractures. 2. Hiatus hernia 

containing 





 the gastric fundus. 3. The solid and hollow organs of the abdomen/pelvis 

appear 





 grossly intact.     Fredy Marquez MD 


 


Tibia/Fibula X-Ray 3/8/18 0000 Signed





Impressions: 





 Service Date/Time:   19:39 - CONCLUSION:  Proximal 





 metaphyseal tibial fracture with probable comminution.  Significantly 

displaced 





 comminuted fracture of the distal femur.     Fredy Marquez MD 


 


Humerus X-Ray 3/8/18 0000 Signed





Impressions: 





 Service Date/Time:   19:39 - CONCLUSION:  Comminuted 

and 





 angulated fracture of the distal one third humerus.     Fredy Marquez MD 


 


Femur X-Ray 3/8/18 0000 Signed





Impressions: 





 Service Date/Time:   19:39 - CONCLUSION:  Comminuted 

and 





  fracture of the distal femur.     Fredy Marquez MD 








Procedures


3/8/2018: Left subclavian Cordis IV central line placed


3/8/2018: Femoral arterial line placement


3/8/2018: Right frontal bur hole with placement of an intracranial pressure 

monitor


.





Assessment and Plan


Disease Oriented Problem List:  


(1) Tibial fracture


(2) Ribs, multiple fractures


(3) Humerus distal fracture


(4) Metabolic acidosis


(5) Intracranial hemorrhage


(6) Pleural effusion


(7) Hemorrhagic shock


(8) Acute respiratory failure


(9) Bilateral orbit fractures


Symptom Scale:  


(1) Pain


(2) Dyspnea


Pertinent Non-Medical Issues


Psychosocial: Patient is from [place] and moved to [place] last year. He has 4 

daughters plus grand-children and great-grandchildren. Patient had many 

different jobs. 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 They  in ; his ex-wife  on 

hospice the following month.  Patient was allegedly remarried to Mildred in 2018. However, the family is challenging if the marriage is legal.


Spiritual: Anabaptist casandra


Legal: Attempting to identify the legal healthcare proxy decision-maker.


Ethical issues impacting care: No known ethical issues impacting care at this 

time.


Important Contacts


Mildred Infante, wife: [phone]


Laura Key, daughter: [phone]


.


Prognosis


Patient remains critically ill with multiple life-threatening  injuries status 

post prison on 3/8/2018. Patient is high risk for ongoing setbacks and 

complications.


.


Code Status:  Full Code


Plan


* FULL CODE


* Decision-making: The patient lacks capacity for decision-making and will not 

regain that capacity.  Mildred Infante, wife, is the healthcare proxy decision-

maker.


* Palliative care spoke to the patient's wife (Mildred) and her sister in the 

conference room, as well as daughter (Laura) via telephone today.  A medical 

update on patient's clinical condition was provided to both wife and daughter.  

Both the patient's wife and the patient's daughter are saying that they do not 

believe the patient would want to be continued on life support if the prognosis 

is very poor.  The wife is considering possibly withdrawal of life support 

after she gathers additional information about prognosis; the trach and PEG are 

being postponed for now.


* Symptom management:


            = Pain: Multifactorial.  Multiple fractures and TBI status post 

prison.  Patient is currently on fentanyl.


   = Dyspnea: Patient orotracheally intubated on mechanical ventilation; 

worsening pleural effusions.  On scheduled Duonebs


* Palliative care will continue to follow this patient throughout his 

hospitalization to establish chest, assist with symptom management and 

clarification of medical treatment goals.


.





Time Spent


Total Floor Time (mins):  44


Face to Face Time (mins):  14


>50% Counseling/Coord of Care:  Yes (d/w Pratik Borges, Shoaib)





Attestation


To help prompt me to consider important information that might be impacting 

today's encounter and assessment, information from prior notes written by 

myself or my colleagues may have been "brought forward" into today's note.  My 

signature on this note, however, is an attestation that I personally performed 

the exam, history, and/or decision-making noted today, and, unless otherwise 

indicated, the interactions with patient, family, and staff as well as the 

review of records all occurred today.  I also attest that the listed assessment 

and stated plan reflect my best clinical judgment today based on the 

combination of historical information, prior notes, and today's exam/ 

interactions.  When time spent is documented, it refers only to time spent 

today by the signer, or if indicated, combined time spent today by 

collaborating physician/nurse practitioner.











Vicki Donnelly MD Mar 14, 2018 11:48

## 2018-03-14 NOTE — RADRPT
EXAM DATE/TIME:  03/14/2018 05:01 

 

[facility] COMPARISON:     

CHEST SINGLE AP, March 13, 2018, 5:04.

 

                     

INDICATIONS :     

Shortness of breath.

                     

 

MEDICAL HISTORY :     

None.          

 

SURGICAL HISTORY :     

None.   

 

ENCOUNTER:     

Subsequent                                        

 

ACUITY:     

1 week      

 

PAIN SCORE:     

Non-responsive.

 

LOCATION:     

Bilateral chest 

 

FINDINGS:     

A single portable frontal view of the chest shows a parenchymal consolidation within both lung bases 
but most pronounced on the left. These are stable from the prior study. No effusions. Heart is normal
 in size. Tip of endotracheal tube 4 cm to the tarik. Left clavicular surgical plate.

 

CONCLUSION:     

Stable bibasilar infiltrates.

 

 

 

 Fredy Sadler Jr., MD on March 14, 2018 at 5:55           

Board Certified Radiologist.

 This report was verified electronically.
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
## 2018-03-15 VITALS
SYSTOLIC BLOOD PRESSURE: 140 MMHG | TEMPERATURE: 99.5 F | DIASTOLIC BLOOD PRESSURE: 63 MMHG | RESPIRATION RATE: 16 BRPM | HEART RATE: 92 BPM | OXYGEN SATURATION: 98 %

## 2018-03-15 VITALS
HEART RATE: 92 BPM | DIASTOLIC BLOOD PRESSURE: 73 MMHG | OXYGEN SATURATION: 99 % | TEMPERATURE: 99.1 F | SYSTOLIC BLOOD PRESSURE: 148 MMHG | RESPIRATION RATE: 16 BRPM

## 2018-03-15 VITALS — OXYGEN SATURATION: 97 %

## 2018-03-15 VITALS
DIASTOLIC BLOOD PRESSURE: 75 MMHG | TEMPERATURE: 99.1 F | HEART RATE: 87 BPM | RESPIRATION RATE: 18 BRPM | SYSTOLIC BLOOD PRESSURE: 142 MMHG | OXYGEN SATURATION: 99 %

## 2018-03-15 VITALS
RESPIRATION RATE: 28 BRPM | OXYGEN SATURATION: 97 % | SYSTOLIC BLOOD PRESSURE: 137 MMHG | DIASTOLIC BLOOD PRESSURE: 59 MMHG | TEMPERATURE: 99.7 F | HEART RATE: 90 BPM

## 2018-03-15 VITALS — OXYGEN SATURATION: 98 %

## 2018-03-15 VITALS
RESPIRATION RATE: 16 BRPM | DIASTOLIC BLOOD PRESSURE: 70 MMHG | OXYGEN SATURATION: 100 % | TEMPERATURE: 99.2 F | SYSTOLIC BLOOD PRESSURE: 146 MMHG | HEART RATE: 83 BPM

## 2018-03-15 VITALS — HEART RATE: 86 BPM

## 2018-03-15 VITALS — OXYGEN SATURATION: 99 %

## 2018-03-15 VITALS
DIASTOLIC BLOOD PRESSURE: 73 MMHG | HEART RATE: 88 BPM | SYSTOLIC BLOOD PRESSURE: 161 MMHG | OXYGEN SATURATION: 98 % | RESPIRATION RATE: 16 BRPM | TEMPERATURE: 98 F

## 2018-03-15 VITALS — HEART RATE: 84 BPM

## 2018-03-15 VITALS — HEART RATE: 88 BPM

## 2018-03-15 VITALS — HEART RATE: 87 BPM

## 2018-03-15 VITALS — HEART RATE: 90 BPM

## 2018-03-15 VITALS — OXYGEN SATURATION: 100 %

## 2018-03-15 LAB
ACANTHOCYTES BLD QL SMEAR: (no result)
ALBUMIN SERPL-MCNC: 2.6 GM/DL (ref 3.4–5)
ALP SERPL-CCNC: 69 U/L (ref 45–117)
ALT SERPL-CCNC: 18 U/L (ref 12–78)
AST SERPL-CCNC: 40 U/L (ref 15–37)
BASOPHILS # BLD AUTO: 0 TH/MM3 (ref 0–0.2)
BASOPHILS NFR BLD: 0.2 % (ref 0–2)
BILIRUB SERPL-MCNC: 0.7 MG/DL (ref 0.2–1)
BUN SERPL-MCNC: 27 MG/DL (ref 7–18)
CALCIUM SERPL-MCNC: 8.2 MG/DL (ref 8.5–10.1)
CHLORIDE SERPL-SCNC: 118 MEQ/L (ref 98–107)
CREAT SERPL-MCNC: 0.74 MG/DL (ref 0.6–1.3)
EOSINOPHIL # BLD: 0.1 TH/MM3 (ref 0–0.4)
EOSINOPHIL NFR BLD: 0.7 % (ref 0–4)
ERYTHROCYTE [DISTWIDTH] IN BLOOD BY AUTOMATED COUNT: 19 % (ref 11.6–17.2)
GFR SERPLBLD BASED ON 1.73 SQ M-ARVRAT: 106 ML/MIN (ref 89–?)
GLUCOSE SERPL-MCNC: 110 MG/DL (ref 74–106)
HCO3 BLD-SCNC: 27.1 MEQ/L (ref 21–32)
HCT VFR BLD CALC: 25.7 % (ref 39–51)
HGB BLD-MCNC: 8.6 GM/DL (ref 13–17)
LYMPHOCYTES # BLD AUTO: 1.3 TH/MM3 (ref 1–4.8)
LYMPHOCYTES NFR BLD AUTO: 11.1 % (ref 9–44)
LYMPHOCYTES: 5 % (ref 9–44)
MCH RBC QN AUTO: 28.1 PG (ref 27–34)
MCHC RBC AUTO-ENTMCNC: 33.6 % (ref 32–36)
MCV RBC AUTO: 83.4 FL (ref 80–100)
METAMYELOCYTES NFR BLD: 3 % (ref 0–1)
MONOCYTE #: 1.3 TH/MM3 (ref 0–0.9)
MONOCYTES NFR BLD: 11 % (ref 0–8)
MONOCYTES: 5 % (ref 0–8)
MYELOCYTES NFR BLD: 1 % (ref 0–0)
NEUTROPHILS # BLD AUTO: 8.8 TH/MM3 (ref 1.8–7.7)
NEUTROPHILS NFR BLD AUTO: 77 % (ref 16–70)
NEUTS BAND # BLD MANUAL: 10 TH/MM3 (ref 1.8–7.7)
NEUTS BAND NFR BLD: 17 % (ref 0–6)
NEUTS SEG NFR BLD MANUAL: 67 % (ref 16–70)
PLATELET # BLD: 177 TH/MM3 (ref 150–450)
PMV BLD AUTO: 7.8 FL (ref 7–11)
PROT SERPL-MCNC: 5.9 GM/DL (ref 6.4–8.2)
RBC # BLD AUTO: 3.08 MIL/MM3 (ref 4.5–5.9)
SODIUM SERPL-SCNC: 155 MEQ/L (ref 136–145)
WBC # BLD AUTO: 11.4 TH/MM3 (ref 4–11)

## 2018-03-15 RX ADMIN — LEVETIRACETAM SCH MLS/HR: 100 INJECTION, SOLUTION, CONCENTRATE INTRAVENOUS at 20:55

## 2018-03-15 RX ADMIN — CHLORHEXIDINE GLUCONATE 0.12% ORAL RINSE SCH ML: 1.2 LIQUID ORAL at 20:00

## 2018-03-15 RX ADMIN — LEVETIRACETAM SCH MLS/HR: 100 INJECTION, SOLUTION, CONCENTRATE INTRAVENOUS at 09:00

## 2018-03-15 RX ADMIN — BACITRACIN SCH APPLIC: 500 OINTMENT TOPICAL at 20:56

## 2018-03-15 RX ADMIN — Medication PRN MLS/HR: at 23:48

## 2018-03-15 RX ADMIN — Medication PRN MLS/HR: at 02:00

## 2018-03-15 RX ADMIN — FUROSEMIDE SCH MG: 10 INJECTION, SOLUTION INTRAMUSCULAR; INTRAVENOUS at 09:00

## 2018-03-15 RX ADMIN — CHLORHEXIDINE GLUCONATE SCH PACK: 500 CLOTH TOPICAL at 03:30

## 2018-03-15 RX ADMIN — Medication PRN MLS/HR: at 13:01

## 2018-03-15 RX ADMIN — MAGNESIUM HYDROXIDE SCH ML: 400 SUSPENSION ORAL at 20:55

## 2018-03-15 RX ADMIN — CHLORHEXIDINE GLUCONATE 0.12% ORAL RINSE SCH ML: 1.2 LIQUID ORAL at 08:00

## 2018-03-15 RX ADMIN — PANTOPRAZOLE SODIUM SCH MG: 40 INJECTION, POWDER, FOR SOLUTION INTRAVENOUS at 20:54

## 2018-03-15 RX ADMIN — CEFAZOLIN SODIUM SCH MLS/HR: 2 SOLUTION INTRAVENOUS at 16:16

## 2018-03-15 RX ADMIN — POTASSIUM CHLORIDE PRN MLS/HR: 400 INJECTION, SOLUTION INTRAVENOUS at 03:30

## 2018-03-15 RX ADMIN — DOCUSATE SODIUM SCH MG: 100 CAPSULE, LIQUID FILLED ORAL at 09:00

## 2018-03-15 RX ADMIN — DOCUSATE SODIUM SCH MG: 100 CAPSULE, LIQUID FILLED ORAL at 20:55

## 2018-03-15 RX ADMIN — MAGNESIUM HYDROXIDE SCH ML: 400 SUSPENSION ORAL at 09:00

## 2018-03-15 RX ADMIN — BACITRACIN SCH APPLIC: 500 OINTMENT TOPICAL at 09:00

## 2018-03-15 RX ADMIN — POTASSIUM CHLORIDE PRN MLS/HR: 400 INJECTION, SOLUTION INTRAVENOUS at 05:38

## 2018-03-15 RX ADMIN — CEFAZOLIN SODIUM SCH MLS/HR: 2 SOLUTION INTRAVENOUS at 06:22
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
## 2018-03-15 NOTE — RADRPT
EXAM DATE/TIME:  03/15/2018 01:00 

 

[facility] COMPARISON:     

CHEST SINGLE AP, March 14, 2018, 5:01.

 

                     

INDICATIONS :     

Shortness of breath. 

                     

 

MEDICAL HISTORY :     

None.          

 

SURGICAL HISTORY :     

None.   

 

ENCOUNTER:     

Subsequent                                        

 

ACUITY:     

1 week      

 

PAIN SCORE:     

Non-responsive.

 

LOCATION:     

Bilateral chest 

 

FINDINGS:     

Single portable frontal view the chest shows some improvement in the bibasilar infiltrates. Tiny left
 effusion now suspected. Heart is normal size. Tip of the endotracheal tube 5 cm cephalad to the jamarcus
na. A catheter overlies the left chest. Orthopedic plate involving left clavicle.

 

CONCLUSION:     

Improving bibasilar infiltrates. Tiny left effusion suspected.

 

 

 

 Fredy Sadler Jr., MD on March 15, 2018 at 1:52           

Board Certified Radiologist.

 This report was verified electronically.

## 2018-03-15 NOTE — HHI.PR
Review/Management


Daily Summary


3/15


examined pt along with RN


lengthy discussed with his wife and her sister in the brown


minimal gag to suction


mild withdrawal to strong somatosensory stim


pupils mildly reactive, os more obvious than od


corneal decreased right more than left


msr absent


plantar stim responses?


reviewed ct brain studies, ideally an MRI would be best but I am not sure it 

could be accomplished now





the neuro prognosis is suggestive of a moderate to severe neurologic impairment 

in the best scenario and 


considering a maximum level of care, the wife seems sure he would not want to 

live with such neurologic impairment 


but she also seems uncertain about making a decision to move him to comfort care

, will reassess if needed, monitor





Subjective


Subjective Comments


comatose


Active Medications





Current Medications








 Medications


  (Trade)  Dose


 Ordered  Sig/Kirby


 Route  Start Time


 Stop Time Status Last Admin


 


  (NS Flush)  2 ml  UNSCH  PRN


 IV FLUSH  3/8/18 21:00


     


 


 


  (Vasotec Inj)  1.25 mg  Q8H  PRN


 IV PUSH  3/8/18 21:00


    3/14/18 22:07


 


 


  (Zofran Inj)  4 mg  Q6H  PRN


 IV PUSH  3/8/18 21:00


     


 


 


  (Protonix Inj)  40 mg  Q24H


 IVP  3/8/18 21:00


    3/14/18 22:07


 


 


  (Baciguent Oint)  1 applic  BID


 TOP  3/8/18 21:00


    3/15/18 09:00


 


 


  (Colace)  100 mg  BID


 PO  3/8/18 21:00


    3/14/18 22:07


 


 


 Miscellaneous


 Information  1  Q361D


 XX  3/8/18 21:00


    3/8/18 21:00


 


 


  (Chlorhexidine


 2% Cloth)  


 Taper  DAILY@04


 TOP  3/9/18 04:00


 3/5/19 03:59   


 


 


  (Chlorhexidine


 2% Cloth)  3 pack  UNSCH  PRN


 TOP  3/8/18 21:00


     


 


 


 Potassium Chloride  100 ml @ 


 50 mls/hr  Q2H  PRN


 IV  3/8/18 21:45


    3/15/18 05:38


 


 


 Potassium Chloride  100 ml @ 


 50 mls/hr  Q2H  PRN


 IV  3/8/18 21:45


     


 


 


  (K-Lyte Cl  Eff)  50 meq  UNSCH  PRN


 PO  3/8/18 21:45


     


 


 


 Potassium Chloride  100 ml @ 


 25 mls/hr  UNSCH  PRN


 IV  3/8/18 21:45


    3/13/18 05:08


 


 


 Potassium Chloride  100 ml @ 


 50 mls/hr  Q2H  PRN


 IV  3/8/18 21:45


     


 


 


 Magnesium Sulfate


 4 gm/Sodium


 Chloride  100 ml @ 


 50 mls/hr  UNSCH  PRN


 IV  3/8/18 21:45


     


 


 


  (Mag-Ox)  800 mg  UNSCH  PRN


 PO  3/8/18 21:45


     


 


 


 Magnesium Sulfate


 2 gm/Sodium


 Chloride  100 ml @ 


 50 mls/hr  UNSCH  PRN


 IV  3/8/18 21:45


    3/10/18 12:19


 


 


  (K-Phos)  2,000 mg  Q4H  PRN


 PO  3/8/18 21:45


     


 


 


 Sodium Phosphate


 30 mmol/Sodium


 Chloride  250 ml @ 


 42 mls/hr  UNSCH  PRN


 IV  3/8/18 21:45


     


 


 


  (K-Phos)  2,000 mg  UNSCH  PRN


 PO/TUBE  3/8/18 21:45


     


 


 


 Potassium


 Phosphate 30 mmol/


 Sodium Chloride  260 ml @ 


 42 mls/hr  UNSCH  PRN


 IV  3/8/18 21:45


    3/9/18 20:46


 


 


  (Peridex 0.12%


 Liq)  15 ml  BID@08,20


 MT  3/9/18 08:00


    3/15/18 08:00


 


 


 Fentanyl Citrate  250 ml @ 5


 mls/hr  TITRATE  PRN


 IV  3/8/18 22:00


    3/15/18 02:00


 


 


 Propofol  100 ml @ 


 3.135 mls/


 hr  TITRATE  PRN


 IV  3/8/18 22:00


    3/13/18 22:59


 


 


  (Brethine Inj)  1 mg  UNSCH  PRN


 SQ  3/8/18 23:15


     


 


 


 Levetriacetam 500


 mg/Sodium Chloride  105 ml @ 


 420 mls/hr  Q12HR


 IV  3/9/18 09:00


    3/15/18 09:00


 


 


  (Milk Of


 Magnesia Liq)  30 ml  BID


 PO  3/9/18 09:00


    3/14/18 22:07


 


 


  (Lasix Inj)  40 mg  DAILY


 IV PUSH  3/11/18 10:30


    3/15/18 09:00


 


 


 Cefazolin Sodium


 1000 mg/Sodium


 Chloride  100 ml @ 


 200 mls/hr  ON  CALL


 IV  3/13/18 14:00


 3/16/18 13:59   


 


 


 Cefazolin Sodium/


 Dextrose  50 ml @ 


 100 mls/hr  Q8H


 IV  3/13/18 23:00


 3/15/18 15:29  3/15/18 06:22


 


 


  (Apresoline Inj)  20 mg  Q6H  PRN


 IV PUSH  3/14/18 12:45


    3/15/18 06:22


 








Allergies





Allergies


Coded Allergies


  No Allergy Information Available (Unverified3/8/18)





Exam


I&O / VS





Vital Signs








  Date Time  Temp Pulse Resp B/P (MAP) Pulse Ox O2 Delivery O2 Flow Rate FiO2


 


3/15/18 12:00  87      


 


3/15/18 12:00        40


 


3/15/18 12:00 99.1 87 18 142/75 (97) 99   


 


3/15/18 10:00  86      


 


3/15/18 08:49     99   40


 


3/15/18 08:15        45


 


3/15/18 08:15  86      


 


3/15/18 08:15     99 Mechanical Ventilator  45


 


3/15/18 08:00 99.1 92 16 148/73 (98) 99   


 


3/15/18 06:00  84      


 


3/15/18 04:00 99.2 88 16 146/70 (95) 100   


 


3/15/18 04:00        45


 


3/15/18 04:00  83      


 


3/15/18 03:28     99   40


 


3/15/18 02:00  86      


 


3/15/18 00:00  94      


 


3/15/18 00:00 99.5 92 16 140/63 (88) 98   


 


3/15/18 00:00        45


 


3/14/18 23:23     99   40


 


3/14/18 22:00  99      


 


3/14/18 20:27     99   45


 


3/14/18 20:00  99      


 


3/14/18 20:00        45


 


3/14/18 20:00 99.9 92 16 164/74 (104) 98   


 


3/14/18 19:00     99 Mechanical Ventilator  45


 


3/14/18 18:00  100      


 


3/14/18 16:00        45


 


3/14/18 16:00  94      


 


3/14/18 16:00 100.0 94 18 154/68 (96) 97   


 


3/14/18 15:59     97   45


 


3/14/18 14:00  98      











Objective


Micro and Labs





Laboratory Tests








Test


  3/15/18


03:00 3/15/18


05:10


 


White Blood Count 11.4  


 


Red Blood Count 3.08  


 


Hemoglobin 8.6  


 


Hematocrit 25.7  


 


Mean Corpuscular Volume 83.4  


 


Mean Corpuscular Hemoglobin 28.1  


 


Mean Corpuscular Hemoglobin


Concent 33.6 


  


 


 


Red Cell Distribution Width 19.0  


 


Platelet Count 177  


 


Mean Platelet Volume 7.8  


 


Neutrophils (%) (Auto) 77.0  


 


Lymphocytes (%) (Auto) 11.1  


 


Monocytes (%) (Auto) 11.0  


 


Eosinophils (%) (Auto) 0.7  


 


Basophils (%) (Auto) 0.2  


 


Neutrophils # (Auto) 8.8  


 


Lymphocytes # (Auto) 1.3  


 


Monocytes # (Auto) 1.3  


 


Eosinophils # (Auto) 0.1  


 


Basophils # (Auto) 0.0  


 


CBC Comment AUTO DIFF  


 


Differential Total Cells


Counted 100 


  


 


 


Neutrophils % (Manual) 67  


 


Band Neutrophils % 17  


 


Lymphocytes % 5  


 


Monocytes % 5  


 


Eosinophils % 2  


 


Neutrophils # (Manual) 10.0  


 


Metamyelocytes 3  


 


Myelocytes 1  


 


Differential Comment


  FINAL DIFF


MANUAL 


 


 


Platelet Estimate NORMAL  


 


Platelet Morphology Comment NORMAL  


 


Acanthocytes OCC  


 


Blood Urea Nitrogen 27  


 


Creatinine 0.74  


 


Random Glucose 110  


 


Total Protein 5.9  


 


Albumin 2.6  


 


Calcium Level 8.2  


 


Alkaline Phosphatase 69  


 


Aspartate Amino Transf


(AST/SGOT) 40 


  


 


 


Alanine Aminotransferase


(ALT/SGPT) 18 


  


 


 


Total Bilirubin 0.7  


 


Sodium Level 155  


 


Potassium Level 3.2  


 


Chloride Level 118  


 


Carbon Dioxide Level 27.1  


 


Anion Gap 10  


 


Estimat Glomerular Filtration


Rate 106 


  


 


 


Blood Gas Puncture Site  ART LINE 


 


Blood Gas Patient Temperature  98.6 


 


Blood Gas HCO3  25 


 


Blood Gas Base Excess  1.7 


 


Blood Gas Oxygen Saturation  97 


 


Arterial Blood pH  7.47 


 


Arterial Blood Partial


Pressure CO2 


  35 


 


 


Arterial Blood Partial


Pressure O2 


  136 


 


 


Arterial Blood Oxygen Content  12.4 


 


Arterial Blood


Carboxyhemoglobin 


  1.4 


 


 


Arterial Blood Methemoglobin  1.0 


 


Blood Gas Hemoglobin  8.9 


 


Oxygen Delivery Device  VENTILATOR 


 


Blood Gas Ventilator Setting  PRVC/AC 


 


Blood Gas Inspired Oxygen  40 

















Justus Marin MD Mar 15, 2018 12:38
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
## 2018-03-15 NOTE — HHI.HCPN
Reason for visit





   a.  To assist with evaluation and management of symptoms including: pain, 

dyspnea


   b.  To assist medical decision maker(s) with: better understanding of 

current medical conditions; weighing benefits/burdens of medical treatment 

options; making        


        medical treatment decisions.


.





Subjective/Interval History


INTERVAL NOTE:


The patient remains on fentanyl..  He remains unresponsive.





Chest x-ray continues to indicate bilateral infiltrates.





The trach and PEG have been postponed by the family as they deliberate on 

further defining their goals for the patient.  The patient's wife and her 

sister are waiting for Dr. Marin to speak with them about the neuro findings 

and prognosis.





They do understand that the head injury as part of the large constellation of 

injuries that, together, predict a very poor outcome, almost certainly not 

survivable.


.


Family/friend interactions


Met with wife and her sister again, see above


.





Objective





Vital Signs








  Date Time  Temp Pulse Resp B/P (MAP) Pulse Ox O2 Delivery O2 Flow Rate FiO2


 


3/15/18 10:00  86      


 


3/15/18 08:49     99   40


 


3/15/18 08:15        45


 


3/15/18 08:15  86      


 


3/15/18 08:15     99 Mechanical Ventilator  45


 


3/15/18 08:00 99.1 92 16 148/73 (98) 99   


 


3/15/18 06:00  84      


 


3/15/18 04:00 99.2 88 16 146/70 (95) 100   


 


3/15/18 04:00        45


 


3/15/18 04:00  83      


 


3/15/18 03:28     99   40


 


3/15/18 02:00  86      


 


3/15/18 00:00  94      


 


3/15/18 00:00 99.5 92 16 140/63 (88) 98   


 


3/15/18 00:00        45


 


3/14/18 23:23     99   40


 


3/14/18 22:00  99      


 


3/14/18 20:27     99   45


 


3/14/18 20:00  99      


 


3/14/18 20:00        45


 


3/14/18 20:00 99.9 92 16 164/74 (104) 98   


 


3/14/18 19:00     99 Mechanical Ventilator  45


 


3/14/18 18:00  100      


 


3/14/18 16:00        45


 


3/14/18 16:00  94      


 


3/14/18 16:00 100.0 94 18 154/68 (96) 97   


 


3/14/18 15:59     97   45


 


3/14/18 14:00  98      


 


3/14/18 12:00        45


 


3/14/18 12:00 99.9 116 19 133/72 (92) 95   


 


3/14/18 12:00  116      














Intake & Output  


 


 3/15/18 3/15/18





 06:59 18:59


 


Intake Total 504 ml 


 


Output Total 1000 ml 


 


Balance -496 ml 


 


  


 


Intake IV Total 504 ml 


 


Output Urine Total 1000 ml 


 


# Bowel Movements 0 








Physical Exam


CONSTITUTIONAL/GENERAL: This is an adequately nourished, male patient currently 

intubated on mechanical ventilation


TUBES/LINES/DRAINS: CVL, left femoral arterial line, Cuellar catheter, SCDs, ETT


EYES: Unable to examine eyes/pupils secondary to periorbital edema


ENT: Significant bruising and swelling on face.  Multiple lacerations. 


NECK: C-collar in place.


CARDIOVASCULAR: Regular rate and rhythm without murmurs, gallops, or rubs. No 

JVD. Peripheral pulses symmetric.


RESPIRATORY/CHEST: Orotracheally intubated on mechanical ventilation.  Coarse 

air exchange. Rhonchi


GASTROINTESTINAL: Abdomen soft, non-tender, nondistended.  Bowel sounds present.


GENITOURINARY: Without palpable bladder distension. Cuellar catheter in place. + 

Hematuria


MUSCULOSKELETAL: Left and right upper extremities with splints in place. Right 

lower extremity long splint in place, ex-fix.  Peripheral pulses are difficult 

to palpate


NEUROLOGICAL: unresponsive, on Fentanyl


PSYCHIATRIC: Unable to assess secondary to clinical condition and sedation.


.





Diagnostic Tests


Laboratory





Laboratory Tests








Test


  3/13/18


00:30 3/13/18


05:27 3/13/18


13:20 3/14/18


03:45


 


White Blood Count


  10.8 TH/MM3


(4.0-11.0) 


  


  11.8 TH/MM3


(4.0-11.0)


 


Red Blood Count


  3.15 MIL/MM3


(4.50-5.90) 


  


  3.31 MIL/MM3


(4.50-5.90)


 


Hemoglobin


  9.1 GM/DL


(13.0-17.0) 


  


  9.2 GM/DL


(13.0-17.0)


 


Hematocrit


  26.3 %


(39.0-51.0) 


  


  27.6 %


(39.0-51.0)


 


Mean Corpuscular Volume


  83.5 FL


(80.0-100.0) 


  


  83.4 FL


(80.0-100.0)


 


Mean Corpuscular Hemoglobin


  28.9 PG


(27.0-34.0) 


  


  28.0 PG


(27.0-34.0)


 


Mean Corpuscular Hemoglobin


Concent 34.6 %


(32.0-36.0) 


  


  33.6 %


(32.0-36.0)


 


Red Cell Distribution Width


  18.6 %


(11.6-17.2) 


  


  18.3 %


(11.6-17.2)


 


Platelet Count


  100 TH/MM3


(150-450) 


  


  146 TH/MM3


(150-450)


 


Mean Platelet Volume


  8.6 FL


(7.0-11.0) 


  


  8.0 FL


(7.0-11.0)


 


Neutrophils (%) (Auto)


  75.9 %


(16.0-70.0) 


  


  79.0 %


(16.0-70.0)


 


Lymphocytes (%) (Auto)


  11.8 %


(9.0-44.0) 


  


  10.1 %


(9.0-44.0)


 


Monocytes (%) (Auto)


  9.2 %


(0.0-8.0) 


  


  9.0 %


(0.0-8.0)


 


Eosinophils (%) (Auto)


  2.7 %


(0.0-4.0) 


  


  1.8 %


(0.0-4.0)


 


Basophils (%) (Auto)


  0.4 %


(0.0-2.0) 


  


  0.1 %


(0.0-2.0)


 


Neutrophils # (Auto)


  8.2 TH/MM3


(1.8-7.7) 


  


  9.3 TH/MM3


(1.8-7.7)


 


Lymphocytes # (Auto)


  1.3 TH/MM3


(1.0-4.8) 


  


  1.2 TH/MM3


(1.0-4.8)


 


Monocytes # (Auto)


  1.0 TH/MM3


(0-0.9) 


  


  1.1 TH/MM3


(0-0.9)


 


Eosinophils # (Auto)


  0.3 TH/MM3


(0-0.4) 


  


  0.2 TH/MM3


(0-0.4)


 


Basophils # (Auto)


  0.0 TH/MM3


(0-0.2) 


  


  0.0 TH/MM3


(0-0.2)


 


CBC Comment AUTO DIFF    AUTO DIFF 


 


Differential Comment


  AUTO DIFF


CONFIRMED 


  


  AUTO DIFF


CONFIRMED


 


Platelet Estimate


  LOW (NORMAL) 


  


  


  NORMAL


(NORMAL)


 


Platelet Morphology Comment


  NORMAL


(NORMAL) 


  


  NORMAL


(NORMAL)


 


Basophilic Stippling FAINT (NORMAL)    


 


Helmet Cells OCC (NORMAL)    


 


Blood Urea Nitrogen


  24 MG/DL


(7-18) 


  


  27 MG/DL


(7-18)


 


Creatinine


  0.77 MG/DL


(0.60-1.30) 


  


  0.77 MG/DL


(0.60-1.30)


 


Random Glucose


  79 MG/DL


() 


  


  93 MG/DL


()


 


Total Protein


  5.6 GM/DL


(6.4-8.2) 


  


  6.0 GM/DL


(6.4-8.2)


 


Albumin


  2.3 GM/DL


(3.4-5.0) 


  


  2.3 GM/DL


(3.4-5.0)


 


Calcium Level


  8.3 MG/DL


(8.5-10.1) 


  


  8.4 MG/DL


(8.5-10.1)


 


Alkaline Phosphatase


  67 U/L


() 


  


  74 U/L


()


 


Aspartate Amino Transf


(AST/SGOT) 39 U/L (15-37) 


  


  


  40 U/L (15-37) 


 


 


Alanine Aminotransferase


(ALT/SGPT) 13 U/L (12-78) 


  


  


  15 U/L (12-78) 


 


 


Total Bilirubin


  0.5 MG/DL


(0.2-1.0) 


  


  0.6 MG/DL


(0.2-1.0)


 


Sodium Level


  153 MEQ/L


(136-145) 


  


  153 MEQ/L


(136-145)


 


Potassium Level


  3.5 MEQ/L


(3.5-5.1) 


  3.7 MEQ/L


(3.5-5.1) 3.6 MEQ/L


(3.5-5.1)


 


Chloride Level


  118 MEQ/L


() 


  


  117 MEQ/L


()


 


Carbon Dioxide Level


  25.3 MEQ/L


(21.0-32.0) 


  


  24.9 MEQ/L


(21.0-32.0)


 


Anion Gap


  10 MEQ/L


(5-15) 


  


  11 MEQ/L


(5-15)


 


Estimat Glomerular Filtration


Rate 101 ML/MIN


(>89) 


  


  101 ML/MIN


(>89)


 


Blood Gas Puncture Site  LT FEMORAL   


 


Blood Gas Patient Temperature  98.6   


 


Blood Gas HCO3


  


  21 mmol/L


(22-26) 


  


 


 


Blood Gas Base Excess


  


  -2.9 mmol/L


(-2-2) 


  


 


 


Blood Gas Oxygen Saturation  96 % ()   


 


Arterial Blood pH


  


  7.41


(7.380-7.420) 


  


 


 


Arterial Blood Partial


Pressure CO2 


  34 mmHg


(38-42) 


  


 


 


Arterial Blood Partial


Pressure O2 


  105 mmHg


() 


  


 


 


Arterial Blood Oxygen Content


  


  17.3 Vol %


(12.0-20.0) 


  


 


 


Arterial Blood


Carboxyhemoglobin 


  1.1 % (0-4) 


  


  


 


 


Arterial Blood Methemoglobin  0.9 % (0-2)   


 


Blood Gas Hemoglobin


  


  12.7 G/DL


(12.0-16.0) 


  


 


 


Oxygen Delivery Device  VENTILATOR   


 


Blood Gas Ventilator Setting     


 


Blood Gas Inspired Oxygen  45 %   


 


Test


  3/14/18


04:19 3/15/18


03:00 3/15/18


05:10 


 


 


Blood Gas Puncture Site ART LINE   ART LINE  


 


Blood Gas Patient Temperature 98.6   98.6  


 


Blood Gas HCO3


  21 mmol/L


(22-26) 


  25 mmol/L


(22-26) 


 


 


Blood Gas Base Excess


  -2.3 mmol/L


(-2-2) 


  1.7 mmol/L


(-2-2) 


 


 


Blood Gas Oxygen Saturation 96 % ()   97 % ()  


 


Arterial Blood pH


  7.44


(7.380-7.420) 


  7.47


(7.380-7.420) 


 


 


Arterial Blood Partial


Pressure CO2 32 mmHg


(38-42) 


  35 mmHg


(38-42) 


 


 


Arterial Blood Partial


Pressure O2 113 mmHg


() 


  136 mmHg


() 


 


 


Arterial Blood Oxygen Content


  11.5 Vol %


(12.0-20.0) 


  12.4 Vol %


(12.0-20.0) 


 


 


Arterial Blood


Carboxyhemoglobin 1.4 % (0-4) 


  


  1.4 % (0-4) 


  


 


 


Arterial Blood Methemoglobin 1.2 % (0-2)   1.0 % (0-2)  


 


Blood Gas Hemoglobin


  8.4 G/DL


(12.0-16.0) 


  8.9 G/DL


(12.0-16.0) 


 


 


Oxygen Delivery Device VENTILATOR   VENTILATOR  


 


Blood Gas Ventilator Setting PRVC/AC   PRVC/AC  


 


Blood Gas Inspired Oxygen 45 %   40 %  


 


White Blood Count


  


  11.4 TH/MM3


(4.0-11.0) 


  


 


 


Red Blood Count


  


  3.08 MIL/MM3


(4.50-5.90) 


  


 


 


Hemoglobin


  


  8.6 GM/DL


(13.0-17.0) 


  


 


 


Hematocrit


  


  25.7 %


(39.0-51.0) 


  


 


 


Mean Corpuscular Volume


  


  83.4 FL


(80.0-100.0) 


  


 


 


Mean Corpuscular Hemoglobin


  


  28.1 PG


(27.0-34.0) 


  


 


 


Mean Corpuscular Hemoglobin


Concent 


  33.6 %


(32.0-36.0) 


  


 


 


Red Cell Distribution Width


  


  19.0 %


(11.6-17.2) 


  


 


 


Platelet Count


  


  177 TH/MM3


(150-450) 


  


 


 


Mean Platelet Volume


  


  7.8 FL


(7.0-11.0) 


  


 


 


Neutrophils (%) (Auto)


  


  77.0 %


(16.0-70.0) 


  


 


 


Lymphocytes (%) (Auto)


  


  11.1 %


(9.0-44.0) 


  


 


 


Monocytes (%) (Auto)


  


  11.0 %


(0.0-8.0) 


  


 


 


Eosinophils (%) (Auto)


  


  0.7 %


(0.0-4.0) 


  


 


 


Basophils (%) (Auto)


  


  0.2 %


(0.0-2.0) 


  


 


 


Neutrophils # (Auto)


  


  8.8 TH/MM3


(1.8-7.7) 


  


 


 


Lymphocytes # (Auto)


  


  1.3 TH/MM3


(1.0-4.8) 


  


 


 


Monocytes # (Auto)


  


  1.3 TH/MM3


(0-0.9) 


  


 


 


Eosinophils # (Auto)


  


  0.1 TH/MM3


(0-0.4) 


  


 


 


Basophils # (Auto)


  


  0.0 TH/MM3


(0-0.2) 


  


 


 


CBC Comment  AUTO DIFF   


 


Differential Total Cells


Counted 


  100 


  


  


 


 


Neutrophils % (Manual)  67 % (16-70)   


 


Band Neutrophils %  17 % (0-6)   


 


Lymphocytes %  5 % (9-44)   


 


Monocytes %  5 % (0-8)   


 


Eosinophils %  2 % (0-4)   


 


Neutrophils # (Manual)


  


  10.0 TH/MM3


(1.8-7.7) 


  


 


 


Metamyelocytes  3 % (0-1)   


 


Myelocytes  1 % (0-0)   


 


Differential Comment


  


  FINAL DIFF


MANUAL 


  


 


 


Platelet Estimate


  


  NORMAL


(NORMAL) 


  


 


 


Platelet Morphology Comment


  


  NORMAL


(NORMAL) 


  


 


 


Acanthocytes  OCC (NORMAL)   


 


Blood Urea Nitrogen


  


  27 MG/DL


(7-18) 


  


 


 


Creatinine


  


  0.74 MG/DL


(0.60-1.30) 


  


 


 


Random Glucose


  


  110 MG/DL


() 


  


 


 


Total Protein


  


  5.9 GM/DL


(6.4-8.2) 


  


 


 


Albumin


  


  2.6 GM/DL


(3.4-5.0) 


  


 


 


Calcium Level


  


  8.2 MG/DL


(8.5-10.1) 


  


 


 


Alkaline Phosphatase


  


  69 U/L


() 


  


 


 


Aspartate Amino Transf


(AST/SGOT) 


  40 U/L (15-37) 


  


  


 


 


Alanine Aminotransferase


(ALT/SGPT) 


  18 U/L (12-78) 


  


  


 


 


Total Bilirubin


  


  0.7 MG/DL


(0.2-1.0) 


  


 


 


Sodium Level


  


  155 MEQ/L


(136-145) 


  


 


 


Potassium Level


  


  3.2 MEQ/L


(3.5-5.1) 


  


 


 


Chloride Level


  


  118 MEQ/L


() 


  


 


 


Carbon Dioxide Level


  


  27.1 MEQ/L


(21.0-32.0) 


  


 


 


Anion Gap


  


  10 MEQ/L


(5-15) 


  


 


 


Estimat Glomerular Filtration


Rate 


  106 ML/MIN


(>89) 


  


 








Result Diagram:  


3/15/18 0300                                                                   

             3/15/18 0300





Imaging





Last Impressions








Chest X-Ray 3/15/18 0600 Signed





Impressions: 





 Service Date/Time:  Thursday, March 15, 2018 01:00 - CONCLUSION:  Improving 





 bibasilar infiltrates. Tiny left effusion suspected.     Fredy Sadler Jr., MD 


 


Wrist X-Ray 3/13/18 0000 Signed





Impressions: 





 Service Date/Time:  2018 18:14 - CONCLUSION:  Interim screw 





 and plate fixation of distal radial fracture in near-anatomic alignment. No 





 acute complication demonstrated.     Jose Enrique Chiu MD 


 


Radius/Ulna X-Ray 3/13/18 0000 Signed





Impressions: 





 Service Date/Time:  2018 18:14 - CONCLUSION:  Expected 





 radiographic appearance post screw and plate fixation of shaft fractures of 

the 





 right radius and ulna.     Jose Enrique Chiu MD 


 


Head CT 3/9/18 0600 Signed





Impressions: 





 Service Date/Time:  2018 18:18 - CONCLUSION:  The extra-axial 





 fluid collection at the posterior highest convexity occipital region is less 





 prominent than on prior examination and has features on today's examination 





 suggesting subpleural blood.  Persistent subarachnoid hemorrhage in the right 





 hemisphere and new small area of subarachnoid hemorrhage posterior left high 





 parietal region.  Degree of swelling in the right hemisphere is similar to 





 prior.  There has been a significant increase the amount of right scalp 





 swelling, now extending only to the high convexities.     Fredy Marquez MD 


 


Neck CTA 3/9/18 0000 Signed





Impressions: 





 Service Date/Time:  2018 18:18 - CONCLUSION:  1. Bilateral 

mild 





 calcified plaque in the carotid bulbs with us to 50%% narrowing. 2. Symmetric 





 diameter to the vertebral arteries. 3. No evidence of dissection.     Fredy Marquez MD 


 


Knee X-Ray 3/9/18 0000 Signed





Impressions: 





 Service Date/Time:  2018 16:13 - CONCLUSION:  Limited images 

as 





 detailed above.     Fredy Sadler Jr., MD 


 


Pelvis X-Ray 3/8/18 1941 Signed





Impressions: 





 Service Date/Time:   19:39 - CONCLUSION:  No gross 





 abnormality seen on this limited exam.     Fredy Marquez MD 


 


Maxillofacial CT 3/8/18 1941 Signed





Impressions: 





 Service Date/Time:   19:45 - CONCLUSION:  1. Bilateral 





 LeFort III facial fractures with significant impaction.. 2. Intracranial 





 displacement of the right superior orbital fracture and comminuted fracture of 





 the anterior paolo carlyn.     Fredy Marquez MD 


 


Chest CT 3/8/18 1941 Signed





Impressions: 





 Service Date/Time:   20:17 - CONCLUSION:  1. Hairline 





 fracture the superior endplate of T7 with concentric paraspinal hematoma 





 extending 7 cm superior/inferior. 2. Multiple nondisplaced fractures of the 





 posterolateral 7th and 10th ribs. 3. Comminuted fracture of the body of the 

left 





 scapula. 4. Hiatus hernia containing the gastric fundus. 5. Probable contusion 





 in the posterior segment right upper lobe and posterolateral left lower chest.

   





   Fredy Marquez MD 


 


Cervical Spine CT 3/8/18 1941 Signed





Impressions: 





 Service Date/Time:   19:45 - CONCLUSION:  No evidence 

of 





 acute fracture or spondylolisthesis.     Fredy Marquez MD 


 


Abdomen/Pelvis CT 3/8/18 1941 Signed





Impressions: 





 Service Date/Time:   20:17 - CONCLUSION:  1. Acute left 





 rib fractures and left transverse process fractures. 2. Hiatus hernia 

containing 





 the gastric fundus. 3. The solid and hollow organs of the abdomen/pelvis 

appear 





 grossly intact.     Fredy Marquez MD 


 


Tibia/Fibula X-Ray 3/8/18 0000 Signed





Impressions: 





 Service Date/Time:   19:39 - CONCLUSION:  Proximal 





 metaphyseal tibial fracture with probable comminution.  Significantly 

displaced 





 comminuted fracture of the distal femur.     Fredy Marquez MD 


 


Humerus X-Ray 3/8/18 0000 Signed





Impressions: 





 Service Date/Time:   19:39 - CONCLUSION:  Comminuted 

and 





 angulated fracture of the distal one third humerus.     Fredy Marquez MD 


 


Femur X-Ray 3/8/18 0000 Signed





Impressions: 





 Service Date/Time:   19:39 - CONCLUSION:  Comminuted 

and 





  fracture of the distal femur.     Fredy Marquez MD 








Procedures


3/8/2018: Left subclavian Cordis IV central line placed


3/8/2018: Femoral arterial line placement


3/8/2018: Right frontal bur hole with placement of an intracranial pressure 

monitor


.





Assessment and Plan


Disease Oriented Problem List:  


(1) Tibial fracture


(2) Ribs, multiple fractures


(3) Humerus distal fracture


(4) Metabolic acidosis


(5) Intracranial hemorrhage


(6) Pleural effusion


(7) Hemorrhagic shock


(8) Acute respiratory failure


(9) Bilateral orbit fractures


Symptom Scale:  


(1) Pain


(2) Dyspnea


Pertinent Non-Medical Issues


Psychosocial: Patient is from [place] and moved to [place] last year. He has 4 

daughters plus grand-children and great-grandchildren. Patient had many 

different jobs. 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 They  in ; his ex-wife  on 

hospice the following month.  Patient was allegedly remarried to Mildred in 2018. However, the family is challenging if the marriage is legal.


Spiritual: Restorationism casandra


Legal: Attempting to identify the legal healthcare proxy decision-maker.


Ethical issues impacting care: No known ethical issues impacting care at this 

time.


Important Contacts


Mildred Infante, wife: [phone]


Laura Key, daughter: [phone]


.


Prognosis


Patient remains critically ill with multiple life-threatening  injuries status 

post MCFP on 3/8/2018. Patient is high risk for ongoing setbacks and 

complications.


.


Code Status:  Full Code


Plan


* FULL CODE


* Decision-making: The patient lacks capacity for decision-making and will not 

regain that capacity.  Mildred Infante, wife, is the healthcare proxy decision-

maker.


* Palliative care spoke to the patient's wife (Mildred) and her sister in the 

conference room, as well as daughter (Laura) via telephone today.  A medical 

update on patient's clinical condition was provided to both wife and daughter.  

Both the patient's wife and the patient's daughter are saying that they do not 

believe the patient would want to be continued on life support long-term if the 

prognosis is very poor.  The wife is considering possibly withdrawal of life 

support after she gathers additional information about prognosis; the trach and 

PEG are being postponed for now.


* Symptom management:


            = Pain: Multifactorial.  Multiple fractures and TBI status post 

MCFP.  Patient is currently on fentanyl.


   = Dyspnea: Patient orotracheally intubated on mechanical ventilation; 

worsening pleural effusions.  On scheduled Duonebs


* Palliative care will continue to follow this patient throughout his 

hospitalization to establish chest, assist with symptom management and 

clarification of medical treatment goals.


.





Time Spent


Total Floor Time (mins):  39


Face to Face Time (mins):  24


>50% Counseling/Coord of Care:  Yes





Attestation


To help prompt me to consider important information that might be impacting 

today's encounter and assessment, information from prior notes written by 

myself or my colleagues may have been "brought forward" into today's note.  My 

signature on this note, however, is an attestation that I personally performed 

the exam, history, and/or decision-making noted today, and, unless otherwise 

indicated, the interactions with patient, family, and staff as well as the 

review of records all occurred today.  I also attest that the listed assessment 

and stated plan reflect my best clinical judgment today based on the 

combination of historical information, prior notes, and today's exam/ 

interactions.  When time spent is documented, it refers only to time spent 

today by the signer, or if indicated, combined time spent today by 

collaborating physician/nurse practitioner.











Vicki Donnelly MD Mar 15, 2018 11:23

## 2018-03-15 NOTE — PD.ORT.PN
Subjective


Subjective Remarks


s/p MCA


right open distal femur s/p exfix


right tibial plateau s/p exfix


right BBFA s/p ORIF - POD 2


left humerus





Objective


Vitals





Vital Signs








  Date Time  Temp Pulse Resp B/P (MAP) Pulse Ox O2 Delivery O2 Flow Rate FiO2


 


3/15/18 06:00  84      


 


3/15/18 04:00 99.2 88 16 146/70 (95) 100   


 


3/15/18 04:00        45


 


3/15/18 04:00  83      


 


3/15/18 03:28     99   40


 


3/15/18 02:00  86      


 


3/15/18 00:00  94      


 


3/15/18 00:00 99.5 92 16 140/63 (88) 98   


 


3/15/18 00:00        45


 


3/14/18 23:23     99   40


 


3/14/18 22:00  99      


 


3/14/18 20:27     99   45


 


3/14/18 20:00  99      


 


3/14/18 20:00        45


 


3/14/18 20:00 99.9 92 16 164/74 (104) 98   


 


3/14/18 19:00     99 Mechanical Ventilator  45


 


3/14/18 18:00  100      


 


3/14/18 16:00        45


 


3/14/18 16:00  94      


 


3/14/18 16:00 100.0 94 18 154/68 (96) 97   


 


3/14/18 15:59     97   45


 


3/14/18 14:00  98      


 


3/14/18 12:00        45


 


3/14/18 12:00 99.9 116 19 133/72 (92) 95   


 


3/14/18 12:00  116      


 


3/14/18 10:08     96   45


 


3/14/18 10:00  114      


 


3/14/18 08:00 99.3 80 16 164/74 (104) 100   


 


3/14/18 08:00  80      


 


3/14/18 08:00        45














I/O      


 


 3/14/18 3/14/18 3/14/18 3/15/18 3/15/18 3/15/18





 07:00 15:00 23:00 07:00 15:00 23:00


 


Intake Total 50 ml 350 ml 800 ml 504 ml  


 


Output Total 950 ml  2850 ml 1000 ml  


 


Balance -900 ml 350 ml -2050 ml -496 ml  


 


      


 


Intake IV Total 50 ml 350 ml 800 ml 504 ml  


 


Output Urine Total 950 ml  2850 ml 1000 ml  


 


# Bowel Movements 0  0 0  








Result Diagram:  


3/15/18 0300                                                                   

             3/15/18 0300





Imaging





Last 24 hours Impressions








Chest X-Ray 3/9/18 0000 Signed





Impressions: 





 Service Date/Time:  Friday, March 9, 2018 02:33 - CONCLUSION:  Modest 

worsening 





 left base consolidation and with a probable tiny left pleural effusion 





 developing.     Jose Enrique Chiu MD 


 


Pelvis X-Ray 3/8/18 1941 Signed





Impressions: 





 Service Date/Time:  Thursday, March 8, 2018 19:39 - CONCLUSION:  No gross 





 abnormality seen on this limited exam.     Fredy Marquez MD 


 


Maxillofacial CT 3/8/18 1941 Signed





Impressions: 





 Service Date/Time:  Thursday, March 8, 2018 19:45 - CONCLUSION:  1. Bilateral 





 LeFort III facial fractures with significant impaction.. 2. Intracranial 





 displacement of the right superior orbital fracture and comminuted fracture of 





 the anterior paolo carlyn.     Fredy Marquez MD 


 


Head CT 3/8/18 1941 Signed





Impressions: 





 Service Date/Time:  Thursday, March 8, 2018 19:45 - CONCLUSION:  1. 

Intracranial 





 hemorrhage predominately on the right including subarachnoid (high convexity), 





 intraparenchymal (right occipital), subdural (right temporal) and epidural (

high 





 convexity right parietal). 2.    LeFort III facial bone fractures with crush 





 injury of the maxillary and ethmoid regions.     Fredy Marquez MD 


 


Chest X-Ray 3/8/18 1941 Signed





Impressions: 





 Service Date/Time:  Thursday, March 8, 2018 19:39 - CONCLUSION:  1. ET tube in 





 good position. 2. Bilateral rib fractures, nondisplaced and multilevel on the 





 right and with a displaced posterior 3rd rib fragment.     Fredy Marquez MD 


 


Chest CT 3/8/18 1941 Signed





Impressions: 





 Service Date/Time:  Thursday, March 8, 2018 20:17 - CONCLUSION:  1. Hairline 





 fracture the superior endplate of T7 with concentric paraspinal hematoma 





 extending 7 cm superior/inferior. 2. Multiple nondisplaced fractures of the 





 posterolateral 7th and 10th ribs. 3. Comminuted fracture of the body of the 

left 





 scapula. 4. Hiatus hernia containing the gastric fundus. 5. Probable contusion 





 in the posterior segment right upper lobe and posterolateral left lower chest.

   





   Fredy Marquez MD 


 


Cervical Spine CT 3/8/18 1941 Signed





Impressions: 





 Service Date/Time:  Thursday, March 8, 2018 19:45 - CONCLUSION:  No evidence 

of 





 acute fracture or spondylolisthesis.     Fredy Marquez MD 


 


Abdomen/Pelvis CT 3/8/18 1941 Signed





Impressions: 





 Service Date/Time:  Thursday, March 8, 2018 20:17 - CONCLUSION:  1. Acute left 





 rib fractures and left transverse process fractures. 2. Hiatus hernia 

containing 





 the gastric fundus. 3. The solid and hollow organs of the abdomen/pelvis 

appear 





 grossly intact.     Fredy Marquez MD 








Objective Remarks


Pt laying in bed


Mech ventilation


VSS





RLE: External fixator in place with clean dry dressings. Intact distal pulses 

and good capillary refills


RUE: Splint, dressings clean and dry. Mild swelling into hand, +nvi


LUE: dressings clean and dry. Moderate swelling hand, +nvi





Assessment & Plan


Assessment and Plan


1) Right Open Distal Femur Fx and Right Tibial Plateau Fx status post external 

fixation, irrigation debridement and traumatic wound closure POD #6


3) Right Radius/Ulna Shaft Fxs s/p ORIF - POD 2


4) Left Distal 1/3 Humerus Fx





Pt on mechanical ventilation. 


RN states pt is currently NPO because they cannot place an NG tube. 


Continue cardiopulmonary support. 


Maintain splints upper extremity


Maintain external fixation with pin care twice a day. 


will need definitive surgery for left humerus and right femur once patient more 

stable











Galindo Segura/First Assist PA Mar 15, 2018 07:57

## 2018-03-16 VITALS
SYSTOLIC BLOOD PRESSURE: 150 MMHG | RESPIRATION RATE: 17 BRPM | DIASTOLIC BLOOD PRESSURE: 74 MMHG | OXYGEN SATURATION: 99 % | TEMPERATURE: 98.2 F | HEART RATE: 96 BPM

## 2018-03-16 VITALS
TEMPERATURE: 99.5 F | HEART RATE: 110 BPM | DIASTOLIC BLOOD PRESSURE: 68 MMHG | SYSTOLIC BLOOD PRESSURE: 135 MMHG | RESPIRATION RATE: 20 BRPM | OXYGEN SATURATION: 98 %

## 2018-03-16 VITALS
TEMPERATURE: 99.1 F | SYSTOLIC BLOOD PRESSURE: 143 MMHG | HEART RATE: 96 BPM | RESPIRATION RATE: 16 BRPM | OXYGEN SATURATION: 95 % | DIASTOLIC BLOOD PRESSURE: 68 MMHG

## 2018-03-16 VITALS
TEMPERATURE: 99.3 F | HEART RATE: 106 BPM | SYSTOLIC BLOOD PRESSURE: 143 MMHG | OXYGEN SATURATION: 100 % | DIASTOLIC BLOOD PRESSURE: 68 MMHG | RESPIRATION RATE: 16 BRPM

## 2018-03-16 VITALS
OXYGEN SATURATION: 98 % | SYSTOLIC BLOOD PRESSURE: 137 MMHG | TEMPERATURE: 99.5 F | HEART RATE: 85 BPM | DIASTOLIC BLOOD PRESSURE: 65 MMHG | RESPIRATION RATE: 16 BRPM

## 2018-03-16 VITALS — OXYGEN SATURATION: 100 %

## 2018-03-16 VITALS — TEMPERATURE: 99 F

## 2018-03-16 VITALS — HEART RATE: 133 BPM

## 2018-03-16 VITALS — HEART RATE: 90 BPM

## 2018-03-16 VITALS — OXYGEN SATURATION: 99 %

## 2018-03-16 VITALS — OXYGEN SATURATION: 98 %

## 2018-03-16 VITALS — HEART RATE: 104 BPM

## 2018-03-16 VITALS — HEART RATE: 87 BPM

## 2018-03-16 VITALS — OXYGEN SATURATION: 96 %

## 2018-03-16 LAB
ALBUMIN SERPL-MCNC: 2.4 GM/DL (ref 3.4–5)
ALP SERPL-CCNC: 66 U/L (ref 45–117)
ALT SERPL-CCNC: 21 U/L (ref 12–78)
AST SERPL-CCNC: 63 U/L (ref 15–37)
BASOPHILS # BLD AUTO: 0 TH/MM3 (ref 0–0.2)
BASOPHILS NFR BLD: 0.2 % (ref 0–2)
BILIRUB SERPL-MCNC: 0.7 MG/DL (ref 0.2–1)
BUN SERPL-MCNC: 29 MG/DL (ref 7–18)
CALCIUM SERPL-MCNC: 8.2 MG/DL (ref 8.5–10.1)
CHLORIDE SERPL-SCNC: 118 MEQ/L (ref 98–107)
CREAT SERPL-MCNC: 0.76 MG/DL (ref 0.6–1.3)
EOSINOPHIL # BLD: 0.1 TH/MM3 (ref 0–0.4)
EOSINOPHIL NFR BLD: 1.1 % (ref 0–4)
ERYTHROCYTE [DISTWIDTH] IN BLOOD BY AUTOMATED COUNT: 19.4 % (ref 11.6–17.2)
GFR SERPLBLD BASED ON 1.73 SQ M-ARVRAT: 102 ML/MIN (ref 89–?)
GLUCOSE SERPL-MCNC: 104 MG/DL (ref 74–106)
HCO3 BLD-SCNC: 28.1 MEQ/L (ref 21–32)
HCT VFR BLD CALC: 25.6 % (ref 39–51)
HGB BLD-MCNC: 9 GM/DL (ref 13–17)
LYMPHOCYTES # BLD AUTO: 1.5 TH/MM3 (ref 1–4.8)
LYMPHOCYTES NFR BLD AUTO: 12.9 % (ref 9–44)
LYMPHOCYTES: 7 % (ref 9–44)
MCH RBC QN AUTO: 29.3 PG (ref 27–34)
MCHC RBC AUTO-ENTMCNC: 35 % (ref 32–36)
MCV RBC AUTO: 83.7 FL (ref 80–100)
METAMYELOCYTES NFR BLD: 4 % (ref 0–1)
MONOCYTE #: 1.4 TH/MM3 (ref 0–0.9)
MONOCYTES NFR BLD: 12.5 % (ref 0–8)
MONOCYTES: 5 % (ref 0–8)
NEUTROPHILS # BLD AUTO: 8.3 TH/MM3 (ref 1.8–7.7)
NEUTROPHILS NFR BLD AUTO: 73.3 % (ref 16–70)
NEUTS BAND # BLD MANUAL: 9.8 TH/MM3 (ref 1.8–7.7)
NEUTS BAND NFR BLD: 13 % (ref 0–6)
NEUTS SEG NFR BLD MANUAL: 70 % (ref 16–70)
NUCLEATED RED BLOOD CELL: 1 (ref 0–0)
PLATELET # BLD: 205 TH/MM3 (ref 150–450)
PMV BLD AUTO: 7.8 FL (ref 7–11)
PROT SERPL-MCNC: 5.8 GM/DL (ref 6.4–8.2)
RBC # BLD AUTO: 3.06 MIL/MM3 (ref 4.5–5.9)
SODIUM SERPL-SCNC: 155 MEQ/L (ref 136–145)
WBC # BLD AUTO: 11.3 TH/MM3 (ref 4–11)
WBC NRBC COR # BLD: 1 /100 WBC (ref 0–0)

## 2018-03-16 RX ADMIN — DOCUSATE SODIUM SCH MG: 100 CAPSULE, LIQUID FILLED ORAL at 09:00

## 2018-03-16 RX ADMIN — FUROSEMIDE SCH MG: 10 INJECTION, SOLUTION INTRAMUSCULAR; INTRAVENOUS at 10:18

## 2018-03-16 RX ADMIN — DOCUSATE SODIUM SCH MG: 100 CAPSULE, LIQUID FILLED ORAL at 21:44

## 2018-03-16 RX ADMIN — CHLORHEXIDINE GLUCONATE SCH PACK: 500 CLOTH TOPICAL at 03:50

## 2018-03-16 RX ADMIN — CHLORHEXIDINE GLUCONATE 0.12% ORAL RINSE SCH ML: 1.2 LIQUID ORAL at 20:00

## 2018-03-16 RX ADMIN — PROPOFOL PRN MLS/HR: 10 INJECTION, EMULSION INTRAVENOUS at 17:50

## 2018-03-16 RX ADMIN — PANTOPRAZOLE SODIUM SCH MG: 40 INJECTION, POWDER, FOR SOLUTION INTRAVENOUS at 21:44

## 2018-03-16 RX ADMIN — LEVETIRACETAM SCH MLS/HR: 100 INJECTION, SOLUTION, CONCENTRATE INTRAVENOUS at 10:16

## 2018-03-16 RX ADMIN — Medication PRN MLS/HR: at 10:15

## 2018-03-16 RX ADMIN — MAGNESIUM HYDROXIDE SCH ML: 400 SUSPENSION ORAL at 21:45

## 2018-03-16 RX ADMIN — MAGNESIUM HYDROXIDE SCH ML: 400 SUSPENSION ORAL at 10:16

## 2018-03-16 RX ADMIN — PROPOFOL PRN MLS/HR: 10 INJECTION, EMULSION INTRAVENOUS at 10:15

## 2018-03-16 RX ADMIN — LEVETIRACETAM SCH MLS/HR: 100 INJECTION, SOLUTION, CONCENTRATE INTRAVENOUS at 21:44

## 2018-03-16 RX ADMIN — BACITRACIN SCH APPLIC: 500 OINTMENT TOPICAL at 21:45

## 2018-03-16 RX ADMIN — POTASSIUM CHLORIDE PRN MLS/HR: 200 INJECTION, SOLUTION INTRAVENOUS at 02:45

## 2018-03-16 RX ADMIN — POTASSIUM CHLORIDE PRN MLS/HR: 200 INJECTION, SOLUTION INTRAVENOUS at 05:14

## 2018-03-16 RX ADMIN — CHLORHEXIDINE GLUCONATE 0.12% ORAL RINSE SCH ML: 1.2 LIQUID ORAL at 08:00

## 2018-03-16 RX ADMIN — BACITRACIN SCH APPLIC: 500 OINTMENT TOPICAL at 10:20

## 2018-03-16 NOTE — RADRPT
EXAM DATE/TIME:  03/16/2018 14:49 

 

[facility] COMPARISON:     

No previous studies available for comparison.

       

 

 

INDICATIONS :     

TBI. MCA.

                     

 

MEDICAL HISTORY :     

Hypertension.     

 

SURGICAL HISTORY :           

Hernia, Lt arm ORIF, Lt ankle

 

ENCOUNTER:     

Subsequent

 

ACUITY:     

1 week

 

PAIN SCORE:     

Nonresponsive.

 

LOCATION:         

cranial

 

TECHNIQUE:     

Multiplanar, multisequence MRI of the brain was performed without contrast.

 

FINDINGS:     

There is a left frontal subdural hygroma measuring up to 12 mm in thickness. There is no acute hemorr
paresh of the right parietal occipital region measuring about 5 mm in thickness. There is a fairly exte
nsive subarachnoid hemorrhage over the convexities, worse on the right side best seen on the suscepti
bility weighted images.

 

There is a right frontal contusion measuring about 3.7 cm in diameter. There also several foci of inc
reased signal involving the deep white matter and posterior corpus callosum, probably shear injuries.


 

There is extensive facial swelling and multiple right-sided facial fractures.

 

CONCLUSION:     

1. 3.7 cm evolving right frontal lobe contusion.

2. Multifocal small signal abnormalities in the white matter and posterior corpus callosum probably r
epresenting small shear injuries.

3. Residual subarachnoid hemorrhage over both convexities, worse on the right side posteriorly.

4. 1.2 cm left frontal subdural hygroma.

5. 5 mm right parietal occipital subacute subdural hematoma.

6. Fluid in the mastoid air cells.

7. Multiple right-sided facial fractures with swelling.

8. Approximately 4 mm left to right midline shift at the left frontal lobe.

 

 

 

 Jeremie Cummings MD on March 16, 2018 at 16:13           

Board Certified Radiologist.

 This report was verified electronically.

## 2018-03-16 NOTE — PD.ORT.PN
Subjective


Subjective Remarks


s/p MCA


right open distal femur s/p exfix


right tibial plateau s/p exfix


right BBFA s/p ORIF - POD 3


left humerus





Objective


Vitals





Vital Signs








  Date Time  Temp Pulse Resp B/P (MAP) Pulse Ox O2 Delivery O2 Flow Rate FiO2


 


3/16/18 06:30  133      


 


3/16/18 04:05     98   40


 


3/16/18 04:00 99.5 123 20 135/68 (90) 98   


 


3/16/18 04:00        40


 


3/16/18 04:00  110      


 


3/16/18 02:00  87      


 


3/16/18 01:33     99   40


 


3/16/18 00:14  90      


 


3/16/18 00:00 99.5 85 16 137/65 (89) 98   


 


3/16/18 00:00        40


 


3/15/18 22:00  90      


 


3/15/18 20:28     98   40


 


3/15/18 20:00 99.7 90 28 137/59 (85) 97   


 


3/15/18 20:00        40


 


3/15/18 19:00     98 Mechanical Ventilator  40


 


3/15/18 18:00  88      


 


3/15/18 16:32     100   30


 


3/15/18 16:00 98.0 88 16 161/73 (102) 98   


 


3/15/18 16:00        40


 


3/15/18 16:00  88      


 


3/15/18 14:00  87      


 


3/15/18 12:29     97   50


 


3/15/18 12:00  87      


 


3/15/18 12:00        40


 


3/15/18 12:00 99.1 87 18 142/75 (97) 99   


 


3/15/18 10:00  86      


 


3/15/18 08:49     99   40


 


3/15/18 08:15        45


 


3/15/18 08:15  86      


 


3/15/18 08:15     99 Mechanical Ventilator  45


 


3/15/18 08:00 99.1 92 16 148/73 (98) 99   














I/O      


 


 3/15/18 3/15/18 3/15/18 3/16/18 3/16/18 3/16/18





 07:00 15:00 23:00 07:00 15:00 23:00


 


Intake Total 504 ml  0 ml 355 ml  


 


Output Total 1000 ml  3000 ml 1000 ml  


 


Balance -496 ml  -3000 ml -645 ml  


 


      


 


Intake Oral   0 ml   


 


IV Total 504 ml   355 ml  


 


Output Urine Total 1000 ml  3000 ml 1000 ml  


 


# Bowel Movements 0  0 0  








Result Diagram:  


3/16/18 0335                                                                   

             3/16/18 0335





Imaging





Last 24 hours Impressions








Chest X-Ray 3/9/18 0000 Signed





Impressions: 





 Service Date/Time:  Friday, March 9, 2018 02:33 - CONCLUSION:  Modest 

worsening 





 left base consolidation and with a probable tiny left pleural effusion 





 developing.     Jose Enrique Chiu MD 


 


Pelvis X-Ray 3/8/18 1941 Signed





Impressions: 





 Service Date/Time:  Thursday, March 8, 2018 19:39 - CONCLUSION:  No gross 





 abnormality seen on this limited exam.     Fredy Marquez MD 


 


Maxillofacial CT 3/8/18 1941 Signed





Impressions: 





 Service Date/Time:  Thursday, March 8, 2018 19:45 - CONCLUSION:  1. Bilateral 





 LeFort III facial fractures with significant impaction.. 2. Intracranial 





 displacement of the right superior orbital fracture and comminuted fracture of 





 the anterior paolo carlyn.     Fredy Marquez MD 


 


Head CT 3/8/18 1941 Signed





Impressions: 





 Service Date/Time:  Thursday, March 8, 2018 19:45 - CONCLUSION:  1. 

Intracranial 





 hemorrhage predominately on the right including subarachnoid (high convexity), 





 intraparenchymal (right occipital), subdural (right temporal) and epidural (

high 





 convexity right parietal). 2.    LeFort III facial bone fractures with crush 





 injury of the maxillary and ethmoid regions.     Fredy Marquez MD 


 


Chest X-Ray 3/8/18 1941 Signed





Impressions: 





 Service Date/Time:  Thursday, March 8, 2018 19:39 - CONCLUSION:  1. ET tube in 





 good position. 2. Bilateral rib fractures, nondisplaced and multilevel on the 





 right and with a displaced posterior 3rd rib fragment.     Fredy Marquez MD 


 


Chest CT 3/8/18 1941 Signed





Impressions: 





 Service Date/Time:  Thursday, March 8, 2018 20:17 - CONCLUSION:  1. Hairline 





 fracture the superior endplate of T7 with concentric paraspinal hematoma 





 extending 7 cm superior/inferior. 2. Multiple nondisplaced fractures of the 





 posterolateral 7th and 10th ribs. 3. Comminuted fracture of the body of the 

left 





 scapula. 4. Hiatus hernia containing the gastric fundus. 5. Probable contusion 





 in the posterior segment right upper lobe and posterolateral left lower chest.

   





   Fredy Marquez MD 


 


Cervical Spine CT 3/8/18 1941 Signed





Impressions: 





 Service Date/Time:  Thursday, March 8, 2018 19:45 - CONCLUSION:  No evidence 

of 





 acute fracture or spondylolisthesis.     Fredy Marquez MD 


 


Abdomen/Pelvis CT 3/8/18 1941 Signed





Impressions: 





 Service Date/Time:  Thursday, March 8, 2018 20:17 - CONCLUSION:  1. Acute left 





 rib fractures and left transverse process fractures. 2. Hiatus hernia 

containing 





 the gastric fundus. 3. The solid and hollow organs of the abdomen/pelvis 

appear 





 grossly intact.     Fredy Marquez MD 








Objective Remarks


Pt laying in bed


Mech ventilation


VSS





RLE: External fixator in place with clean dry dressings. Intact distal pulses 

and good capillary refills


RUE: Splint, dressings clean and dry. Mild swelling into hand, +nvi


LUE: dressings clean and dry. Moderate swelling hand, +nvi





Assessment & Plan


Assessment and Plan


1) Right Open Distal Femur Fx and Right Tibial Plateau Fx status post external 

fixation, irrigation debridement and traumatic wound closure POD #7


3) Right Radius/Ulna Shaft Fxs s/p ORIF - POD 3


4) Left Distal 1/3 Humerus Fx





Pt on mechanical ventilation. 


RN states pt is currently NPO because they cannot place an NG tube. 


Continue cardiopulmonary support. 


Maintain splints upper extremity


Maintain external fixation with pin care twice a day. 


will need definitive surgery for left humerus and right femur once patient more 

stable











Galindo Segura/First Assist PA Mar 16, 2018 07:30
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
## 2018-03-16 NOTE — HHI.HCPN
Reason for visit





   a.  To assist with evaluation and management of symptoms including: pain, 

dyspnea


   b.  To assist medical decision maker(s) with: better understanding of 

current medical conditions; weighing benefits/burdens of medical treatment 

options; making        


        medical treatment decisions.


.





Subjective/Interval History


INTERVAL NOTE:


The patient remains on fentanyl..  He is now following simple commands with his 

left hand and is moving his toes at times.





An MRI was completed of the brain today, and it reveals multiple areas of 

contusion, shearing, and subarachnoid blood.





Dr. Marin writes that the MRI by itself is "not disastrous."





The PEG tube has been placed, and there is a plan for completing the trach 

today.


.


Family/friend interactions


I met with the patient's wife and her sister again.  They report that they want 

to continue aggressive care, including additional surgical procedures, but 

request that we have "a Care Meeting" with the various medical providers, the 

patient's wife and her sister, and the patient's wife's 2 daughters (1 an RN 

and 1 a nurse practitioner).....on Thursday next week.  The patient's wife 

feels she needs to have that meeting before she can confidently make decisions 

about subsequent goals.





Advance Directives


Living Will:  Never completed


Health Care Surrogate:  Never completed


Durable Power of :  Never completed


Advance Directive Specifics


Significant change in goals:


3/16/18: The patient's wife and her sister report that they want to continue 

aggressive care, including additional surgical procedures, but request that we 

have "a Care Meeting" with the various medical providers, the patient's wife 

and her sister, and the patient's wife's 2 daughters (1 an RN and 1 a nurse 

practitioner).....on Thursday next week.  The patient's wife feels she needs to 

have that meeting before she can confidently make decisions about subsequent 

goals.





Objective





Vital Signs








  Date Time  Temp Pulse Resp B/P (MAP) Pulse Ox O2 Delivery O2 Flow Rate FiO2


 


3/16/18 16:35     96   35


 


3/16/18 14:15     100   100


 


3/16/18 12:08     98   35


 


3/16/18 12:00 98.2 96 17 150/74 (99) 99   


 


3/16/18 12:00        40


 


3/16/18 08:36     98   40


 


3/16/18 08:00 99.0       


 


3/16/18 08:00        40


 


3/16/18 07:00     98 Mechanical Ventilator  40


 


3/16/18 06:30  133      


 


3/16/18 04:05     98   40


 


3/16/18 04:00 99.5 123 20 135/68 (90) 98   


 


3/16/18 04:00        40


 


3/16/18 04:00  110      


 


3/16/18 02:00  87      


 


3/16/18 01:33     99   40


 


3/16/18 00:14  90      


 


3/16/18 00:00 99.5 85 16 137/65 (89) 98   


 


3/16/18 00:00        40


 


3/15/18 22:00  90      


 


3/15/18 20:28     98   40


 


3/15/18 20:00 99.7 90 28 137/59 (85) 97   


 


3/15/18 20:00        40


 


3/15/18 19:00     98 Mechanical Ventilator  40


 


3/15/18 18:00  88      














Intake & Output  


 


 3/16/18 3/16/18





 07:00 19:00


 


Intake Total 355 ml 


 


Output Total 1000 ml 


 


Balance -645 ml 


 


  


 


IV Total 355 ml 


 


Output Urine Total 1000 ml 


 


# Bowel Movements 0 








Physical Exam


CONSTITUTIONAL/GENERAL: This is an adequately nourished, male patient currently 

intubated on mechanical ventilation


TUBES/LINES/DRAINS: CVL, left femoral arterial line, Cuellar catheter, SCDs, ETT


EYES: Unable to examine eyes/pupils secondary to periorbital edema


ENT: Significant bruising and swelling on face.  Multiple lacerations. 


NECK: C-collar in place.


CARDIOVASCULAR: Regular rate and rhythm without murmurs, gallops, or rubs. No 

JVD. Peripheral pulses symmetric.


RESPIRATORY/CHEST: Orotracheally intubated on mechanical ventilation.  Coarse 

air exchange. Rhonchi


GASTROINTESTINAL: Abdomen soft, non-tender, nondistended.  Bowel sounds present.


MUSCULOSKELETAL: Left and right upper extremities with splints in place. Right 

lower extremity long splint in place, ex-fix.  Peripheral pulses are difficult 

to palpate


NEUROLOGICAL: He reportedly followed simple commands with his left hand earlier


PSYCHIATRIC: Unable to assess secondary to clinical condition and sedation.


.





Diagnostic Tests


Laboratory





Laboratory Tests








Test


  3/14/18


03:45 3/14/18


04:19 3/15/18


03:00 3/15/18


05:10


 


White Blood Count


  11.8 TH/MM3


(4.0-11.0) 


  11.4 TH/MM3


(4.0-11.0) 


 


 


Red Blood Count


  3.31 MIL/MM3


(4.50-5.90) 


  3.08 MIL/MM3


(4.50-5.90) 


 


 


Hemoglobin


  9.2 GM/DL


(13.0-17.0) 


  8.6 GM/DL


(13.0-17.0) 


 


 


Hematocrit


  27.6 %


(39.0-51.0) 


  25.7 %


(39.0-51.0) 


 


 


Mean Corpuscular Volume


  83.4 FL


(80.0-100.0) 


  83.4 FL


(80.0-100.0) 


 


 


Mean Corpuscular Hemoglobin


  28.0 PG


(27.0-34.0) 


  28.1 PG


(27.0-34.0) 


 


 


Mean Corpuscular Hemoglobin


Concent 33.6 %


(32.0-36.0) 


  33.6 %


(32.0-36.0) 


 


 


Red Cell Distribution Width


  18.3 %


(11.6-17.2) 


  19.0 %


(11.6-17.2) 


 


 


Platelet Count


  146 TH/MM3


(150-450) 


  177 TH/MM3


(150-450) 


 


 


Mean Platelet Volume


  8.0 FL


(7.0-11.0) 


  7.8 FL


(7.0-11.0) 


 


 


Neutrophils (%) (Auto)


  79.0 %


(16.0-70.0) 


  77.0 %


(16.0-70.0) 


 


 


Lymphocytes (%) (Auto)


  10.1 %


(9.0-44.0) 


  11.1 %


(9.0-44.0) 


 


 


Monocytes (%) (Auto)


  9.0 %


(0.0-8.0) 


  11.0 %


(0.0-8.0) 


 


 


Eosinophils (%) (Auto)


  1.8 %


(0.0-4.0) 


  0.7 %


(0.0-4.0) 


 


 


Basophils (%) (Auto)


  0.1 %


(0.0-2.0) 


  0.2 %


(0.0-2.0) 


 


 


Neutrophils # (Auto)


  9.3 TH/MM3


(1.8-7.7) 


  8.8 TH/MM3


(1.8-7.7) 


 


 


Lymphocytes # (Auto)


  1.2 TH/MM3


(1.0-4.8) 


  1.3 TH/MM3


(1.0-4.8) 


 


 


Monocytes # (Auto)


  1.1 TH/MM3


(0-0.9) 


  1.3 TH/MM3


(0-0.9) 


 


 


Eosinophils # (Auto)


  0.2 TH/MM3


(0-0.4) 


  0.1 TH/MM3


(0-0.4) 


 


 


Basophils # (Auto)


  0.0 TH/MM3


(0-0.2) 


  0.0 TH/MM3


(0-0.2) 


 


 


CBC Comment AUTO DIFF   AUTO DIFF  


 


Differential Comment


  AUTO DIFF


CONFIRMED 


  FINAL DIFF


MANUAL 


 


 


Platelet Estimate


  NORMAL


(NORMAL) 


  NORMAL


(NORMAL) 


 


 


Platelet Morphology Comment


  NORMAL


(NORMAL) 


  NORMAL


(NORMAL) 


 


 


Blood Urea Nitrogen


  27 MG/DL


(7-18) 


  27 MG/DL


(7-18) 


 


 


Creatinine


  0.77 MG/DL


(0.60-1.30) 


  0.74 MG/DL


(0.60-1.30) 


 


 


Random Glucose


  93 MG/DL


() 


  110 MG/DL


() 


 


 


Total Protein


  6.0 GM/DL


(6.4-8.2) 


  5.9 GM/DL


(6.4-8.2) 


 


 


Albumin


  2.3 GM/DL


(3.4-5.0) 


  2.6 GM/DL


(3.4-5.0) 


 


 


Calcium Level


  8.4 MG/DL


(8.5-10.1) 


  8.2 MG/DL


(8.5-10.1) 


 


 


Alkaline Phosphatase


  74 U/L


() 


  69 U/L


() 


 


 


Aspartate Amino Transf


(AST/SGOT) 40 U/L (15-37) 


  


  40 U/L (15-37) 


  


 


 


Alanine Aminotransferase


(ALT/SGPT) 15 U/L (12-78) 


  


  18 U/L (12-78) 


  


 


 


Total Bilirubin


  0.6 MG/DL


(0.2-1.0) 


  0.7 MG/DL


(0.2-1.0) 


 


 


Sodium Level


  153 MEQ/L


(136-145) 


  155 MEQ/L


(136-145) 


 


 


Potassium Level


  3.6 MEQ/L


(3.5-5.1) 


  3.2 MEQ/L


(3.5-5.1) 


 


 


Chloride Level


  117 MEQ/L


() 


  118 MEQ/L


() 


 


 


Carbon Dioxide Level


  24.9 MEQ/L


(21.0-32.0) 


  27.1 MEQ/L


(21.0-32.0) 


 


 


Anion Gap


  11 MEQ/L


(5-15) 


  10 MEQ/L


(5-15) 


 


 


Estimat Glomerular Filtration


Rate 101 ML/MIN


(>89) 


  106 ML/MIN


(>89) 


 


 


Blood Gas Puncture Site  ART LINE   ART LINE 


 


Blood Gas Patient Temperature  98.6   98.6 


 


Blood Gas HCO3


  


  21 mmol/L


(22-26) 


  25 mmol/L


(22-26)


 


Blood Gas Base Excess


  


  -2.3 mmol/L


(-2-2) 


  1.7 mmol/L


(-2-2)


 


Blood Gas Oxygen Saturation  96 % ()   97 % () 


 


Arterial Blood pH


  


  7.44


(7.380-7.420) 


  7.47


(7.380-7.420)


 


Arterial Blood Partial


Pressure CO2 


  32 mmHg


(38-42) 


  35 mmHg


(38-42)


 


Arterial Blood Partial


Pressure O2 


  113 mmHg


() 


  136 mmHg


()


 


Arterial Blood Oxygen Content


  


  11.5 Vol %


(12.0-20.0) 


  12.4 Vol %


(12.0-20.0)


 


Arterial Blood


Carboxyhemoglobin 


  1.4 % (0-4) 


  


  1.4 % (0-4) 


 


 


Arterial Blood Methemoglobin  1.2 % (0-2)   1.0 % (0-2) 


 


Blood Gas Hemoglobin


  


  8.4 G/DL


(12.0-16.0) 


  8.9 G/DL


(12.0-16.0)


 


Oxygen Delivery Device  VENTILATOR   VENTILATOR 


 


Blood Gas Ventilator Setting  PRVC/AC   PRVC/AC 


 


Blood Gas Inspired Oxygen  45 %   40 % 


 


Differential Total Cells


Counted 


  


  100 


  


 


 


Neutrophils % (Manual)   67 % (16-70)  


 


Band Neutrophils %   17 % (0-6)  


 


Lymphocytes %   5 % (9-44)  


 


Monocytes %   5 % (0-8)  


 


Eosinophils %   2 % (0-4)  


 


Neutrophils # (Manual)


  


  


  10.0 TH/MM3


(1.8-7.7) 


 


 


Metamyelocytes   3 % (0-1)  


 


Myelocytes   1 % (0-0)  


 


Acanthocytes   OCC (NORMAL)  


 


Test


  3/15/18


18:48 3/16/18


03:35 


  


 


 


Potassium Level


  3.4 MEQ/L


(3.5-5.1) 3.7 MEQ/L


(3.5-5.1) 


  


 


 


White Blood Count


  


  11.3 TH/MM3


(4.0-11.0) 


  


 


 


Red Blood Count


  


  3.06 MIL/MM3


(4.50-5.90) 


  


 


 


Hemoglobin


  


  9.0 GM/DL


(13.0-17.0) 


  


 


 


Hematocrit


  


  25.6 %


(39.0-51.0) 


  


 


 


Mean Corpuscular Volume


  


  83.7 FL


(80.0-100.0) 


  


 


 


Mean Corpuscular Hemoglobin


  


  29.3 PG


(27.0-34.0) 


  


 


 


Mean Corpuscular Hemoglobin


Concent 


  35.0 %


(32.0-36.0) 


  


 


 


Red Cell Distribution Width


  


  19.4 %


(11.6-17.2) 


  


 


 


Platelet Count


  


  205 TH/MM3


(150-450) 


  


 


 


Mean Platelet Volume


  


  7.8 FL


(7.0-11.0) 


  


 


 


Neutrophils (%) (Auto)


  


  73.3 %


(16.0-70.0) 


  


 


 


Lymphocytes (%) (Auto)


  


  12.9 %


(9.0-44.0) 


  


 


 


Monocytes (%) (Auto)


  


  12.5 %


(0.0-8.0) 


  


 


 


Eosinophils (%) (Auto)


  


  1.1 %


(0.0-4.0) 


  


 


 


Basophils (%) (Auto)


  


  0.2 %


(0.0-2.0) 


  


 


 


Neutrophils # (Auto)


  


  8.3 TH/MM3


(1.8-7.7) 


  


 


 


Lymphocytes # (Auto)


  


  1.5 TH/MM3


(1.0-4.8) 


  


 


 


Monocytes # (Auto)


  


  1.4 TH/MM3


(0-0.9) 


  


 


 


Eosinophils # (Auto)


  


  0.1 TH/MM3


(0-0.4) 


  


 


 


Basophils # (Auto)


  


  0.0 TH/MM3


(0-0.2) 


  


 


 


CBC Comment  AUTO DIFF   


 


Differential Total Cells


Counted 


  100 


  


  


 


 


Neutrophils % (Manual)  70 % (16-70)   


 


Band Neutrophils %  13 % (0-6)   


 


Lymphocytes %  7 % (9-44)   


 


Monocytes %  5 % (0-8)   


 


Eosinophils %  1 % (0-4)   


 


Neutrophils # (Manual)


  


  9.8 TH/MM3


(1.8-7.7) 


  


 


 


Metamyelocytes  4 % (0-1)   


 


Nucleated Red Blood Cells


  


  1 /100 WBC


(0-0) 


  


 


 


Differential Comment


  


  FINAL DIFF


MANUAL 


  


 


 


Platelet Estimate


  


  NORMAL


(NORMAL) 


  


 


 


Platelet Morphology Comment


  


  NORMAL


(NORMAL) 


  


 


 


Blood Urea Nitrogen


  


  29 MG/DL


(7-18) 


  


 


 


Creatinine


  


  0.76 MG/DL


(0.60-1.30) 


  


 


 


Random Glucose


  


  104 MG/DL


() 


  


 


 


Total Protein


  


  5.8 GM/DL


(6.4-8.2) 


  


 


 


Albumin


  


  2.4 GM/DL


(3.4-5.0) 


  


 


 


Calcium Level


  


  8.2 MG/DL


(8.5-10.1) 


  


 


 


Alkaline Phosphatase


  


  66 U/L


() 


  


 


 


Aspartate Amino Transf


(AST/SGOT) 


  63 U/L (15-37) 


  


  


 


 


Alanine Aminotransferase


(ALT/SGPT) 


  21 U/L (12-78) 


  


  


 


 


Total Bilirubin


  


  0.7 MG/DL


(0.2-1.0) 


  


 


 


Sodium Level


  


  155 MEQ/L


(136-145) 


  


 


 


Chloride Level


  


  118 MEQ/L


() 


  


 


 


Carbon Dioxide Level


  


  28.1 MEQ/L


(21.0-32.0) 


  


 


 


Anion Gap  9 MEQ/L (5-15)   


 


Estimat Glomerular Filtration


Rate 


  102 ML/MIN


(>89) 


  


 








Result Diagram:  


3/16/18 0335                                                                   

             3/16/18 0335





Imaging





Last Impressions








Brain MRI 3/16/18 0000 Signed





Impressions: 





 Service Date/Time:  2018 14:49 - CONCLUSION:  1. 3.7 cm 





 evolving right frontal lobe contusion. 2. Multifocal small signal 

abnormalities 





 in the white matter and posterior corpus callosum probably representing small 





 shear injuries. 3. Residual subarachnoid hemorrhage over both convexities, 

worse 





 on the right side posteriorly. 4. 1.2 cm left frontal subdural hygroma. 5. 5 

mm 





 right parietal occipital subacute subdural hematoma. 6. Fluid in the mastoid 

air 





 cells. 7. Multiple right-sided facial fractures with swelling. 8. 

Approximately 





 4 mm left to right midline shift at the left frontal lobe.     Jeremie Cummings MD 


 


Abdomen X-Ray 3/16/18 0000 Signed





Impressions: 





 Service Date/Time:  2018 09:42 - CONCLUSION:  Nasogastric 

tube 





 tip in good position in the distal stomach.     Kory Major MD 


 


Chest X-Ray 3/15/18 0600 Signed





Impressions: 





 Service Date/Time:  Thursday, March 15, 2018 01:00 - CONCLUSION:  Improving 





 bibasilar infiltrates. Tiny left effusion suspected.     Fredy Sadler Jr., MD 


 


Wrist X-Ray 3/13/18 0000 Signed





Impressions: 





 Service Date/Time:  2018 18:14 - CONCLUSION:  Interim screw 





 and plate fixation of distal radial fracture in near-anatomic alignment. No 





 acute complication demonstrated.     Jose Enrique Chiu MD 


 


Radius/Ulna X-Ray 3/13/18 0000 Signed





Impressions: 





 Service Date/Time:  2018 18:14 - CONCLUSION:  Expected 





 radiographic appearance post screw and plate fixation of shaft fractures of 

the 





 right radius and ulna.     Jose Enrique Chiu MD 


 


Head CT 3/9/18 0600 Signed





Impressions: 





 Service Date/Time:  2018 18:18 - CONCLUSION:  The extra-axial 





 fluid collection at the posterior highest convexity occipital region is less 





 prominent than on prior examination and has features on today's examination 





 suggesting subpleural blood.  Persistent subarachnoid hemorrhage in the right 





 hemisphere and new small area of subarachnoid hemorrhage posterior left high 





 parietal region.  Degree of swelling in the right hemisphere is similar to 





 prior.  There has been a significant increase the amount of right scalp 





 swelling, now extending only to the high convexities.     Fredy Marquez MD 


 


Neck CTA 3/9/18 0000 Signed





Impressions: 





 Service Date/Time:  2018 18:18 - CONCLUSION:  1. Bilateral 

mild 





 calcified plaque in the carotid bulbs with us to 50%% narrowing. 2. Symmetric 





 diameter to the vertebral arteries. 3. No evidence of dissection.     Fredy Marquez MD 


 


Knee X-Ray 3/9/18 0000 Signed





Impressions: 





 Service Date/Time:  2018 16:13 - CONCLUSION:  Limited images 

as 





 detailed above.     Fredy Sadler Jr., MD 


 


Pelvis X-Ray 3/8/18 1941 Signed





Impressions: 





 Service Date/Time:   19:39 - CONCLUSION:  No gross 





 abnormality seen on this limited exam.     Fredy Marquez MD 


 


Maxillofacial CT 3/8/18 1941 Signed





Impressions: 





 Service Date/Time:   19:45 - CONCLUSION:  1. Bilateral 





 LeFort III facial fractures with significant impaction.. 2. Intracranial 





 displacement of the right superior orbital fracture and comminuted fracture of 





 the anterior paolo carlyn.     Fredy Marquez MD 


 


Chest CT 3/8/18 1941 Signed





Impressions: 





 Service Date/Time:   20:17 - CONCLUSION:  1. Hairline 





 fracture the superior endplate of T7 with concentric paraspinal hematoma 





 extending 7 cm superior/inferior. 2. Multiple nondisplaced fractures of the 





 posterolateral 7th and 10th ribs. 3. Comminuted fracture of the body of the 

left 





 scapula. 4. Hiatus hernia containing the gastric fundus. 5. Probable contusion 





 in the posterior segment right upper lobe and posterolateral left lower chest.

   





   Fredy Marquez MD 


 


Cervical Spine CT 3/8/18 1941 Signed





Impressions: 





 Service Date/Time:   19:45 - CONCLUSION:  No evidence 

of 





 acute fracture or spondylolisthesis.     Fredy Marquez MD 


 


Abdomen/Pelvis CT 3/8/18 1941 Signed





Impressions: 





 Service Date/Time:   20:17 - CONCLUSION:  1. Acute left 





 rib fractures and left transverse process fractures. 2. Hiatus hernia 

containing 





 the gastric fundus. 3. The solid and hollow organs of the abdomen/pelvis 

appear 





 grossly intact.     Fredy Marquez MD 


 


Tibia/Fibula X-Ray 3/8/18 0000 Signed





Impressions: 





 Service Date/Time:   19:39 - CONCLUSION:  Proximal 





 metaphyseal tibial fracture with probable comminution.  Significantly 

displaced 





 comminuted fracture of the distal femur.     Fredy Marquez MD 


 


Humerus X-Ray 3/8/18 0000 Signed





Impressions: 





 Service Date/Time:   19:39 - CONCLUSION:  Comminuted 

and 





 angulated fracture of the distal one third humerus.     Fredy Marquez MD 


 


Femur X-Ray 3/8/18 0000 Signed





Impressions: 





 Service Date/Time:   19:39 - CONCLUSION:  Comminuted 

and 





  fracture of the distal femur.     Fredy Marquez MD 








Procedures


3/8/2018: Left subclavian Cordis IV central line placed


3/8/2018: Femoral arterial line placement


3/8/2018: Right frontal bur hole with placement of an intracranial pressure 

monitor





.





Assessment and Plan


Disease Oriented Problem List:  


(1) Tibial fracture


(2) Ribs, multiple fractures


(3) Humerus distal fracture


(4) Metabolic acidosis


(5) Intracranial hemorrhage


(6) Pleural effusion


(7) Hemorrhagic shock


(8) Acute respiratory failure


(9) Bilateral orbit fractures


Symptom Scale:  


(1) Pain


(2) Dyspnea


Pertinent Non-Medical Issues


Psychosocial: Patient is from [place] and moved to [place] last year. He has 4 

daughters plus grand-children and great-grandchildren. Patient had many 

different jobs. 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 They  in ; his ex-wife  on 

hospice the following month.  Patient was allegedly remarried to Mildred in 2018. However, the family is challenging if the marriage is legal.


Spiritual: Congregational casandra


Legal: Attempting to identify the legal healthcare proxy decision-maker.


Ethical issues impacting care: No known ethical issues impacting care at this 

time.


Important Contacts


Mildred Infante, wife: [phone]


Laura Key, daughter: [phone]


.


Prognosis


Patient remains critically ill with multiple life-threatening  injuries status 

post FCI on 3/8/2018. Patient is high risk for ongoing setbacks and 

complications.


.


Code Status:  Full Code


Plan


* FULL CODE


* DECISION-MAKING: The patient lacks capacity for decision-making and it is 

highly likely that he will not regain that capacity.  Mildred Infante, wife, is the 

healthcare proxy decision-maker.


* GOALS:  3/16/18 -- The patient's wife and her sister report that they want to 

continue aggressive care, including additional surgical procedures, but request 

that we have "a Care Meeting" with the various medical providers, the patient's 

wife and her sister, and the patient's wife's 2 daughters (1 an RN and 1 a 

nurse practitioner).....on Thursday next week.  The patient's wife feels she 

needs to have that meeting before she can confidently make decisions about 

subsequent goals.  Those 2 daughters are flying in from [place] on Wednesday 

night.


* SYMPTOM management:


            = Pain: Multifactorial.  Multiple fractures and TBI status post 

FCI.  Patient is currently on fentanyl.


   = Dyspnea: Patient orotracheally intubated on mechanical ventilation; 

worsening pleural effusions.  On scheduled Duonebs


* Palliative care will continue to follow this patient throughout his 

hospitalization to establish chest, assist with symptom management and 

clarification of medical treatment goals.


.





Time Spent


Total Floor Time (mins):  43


Face to Face Time (mins):  10


>50% Counseling/Coord of Care:  Yes (d/w Dr. KENNEDY and with Dr. Cruz)





Attestation


To help prompt me to consider important information that might be impacting 

today's encounter and assessment, information from prior notes written by 

myself or my colleagues may have been "brought forward" into today's note.  My 

signature on this note, however, is an attestation that I personally performed 

the exam, history, and/or decision-making noted today, and, unless otherwise 

indicated, the interactions with patient, family, and staff as well as the 

review of records all occurred today.  I also attest that the listed assessment 

and stated plan reflect my best clinical judgment today based on the 

combination of historical information, prior notes, and today's exam/ 

interactions.  When time spent is documented, it refers only to time spent 

today by the signer, or if indicated, combined time spent today by 

collaborating physician/nurse practitioner.











Vicki Donnelly MD Mar 16, 2018 16:56

## 2018-03-16 NOTE — RADRPT
EXAM DATE/TIME:  03/16/2018 09:42 

 

[facility] COMPARISON:     

No previous studies available for comparison.

 

                     

INDICATIONS :     

Post NG tube placement

                     

 

MEDICAL HISTORY :            

ICH, clavicle, forearm, wrist and lower extremity fractures   

 

SURGICAL HISTORY :        

ORIF clavicle, forearm, wrist, external fix lower extremity

 

ENCOUNTER:     

Subsequent                                        

 

ACUITY:     

1 week      

 

PAIN SCORE:     

Non-responsive.

 

LOCATION:     

Bilateral  abdomen

 

FINDINGS:     

The nasogastric tube tip is noted within the distal stomach. No bowel obstruction is noted. No free i
ntraperitoneal air is noted. The patient is status post cholecystectomy.

 

CONCLUSION:     

Nasogastric tube tip in good position in the distal stomach. 

 

 

 Kory Major MD on March 16, 2018 at 10:04           

Board Certified Radiologist.

 This report was verified electronically.
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
## 2018-03-16 NOTE — HHI.PR
Review/Management


Daily Summary


3/15


examined pt along with RN


lengthy discussed with his wife and her sister in the [place]


minimal gag to suction


mild withdrawal to strong somatosensory stim


pupils mildly reactive, os more obvious than od


corneal decreased right more than left


msr absent


plantar stim responses?


reviewed ct brain studies, ideally an MRI would be best but I am not sure it 

could be accomplished now





the neuro prognosis is suggestive of a moderate to severe neurologic impairment 

in the best scenario and 


considering a maximum level of care, the wife seems sure he would not want to 

live with such neurologic impairment 


but she also seems uncertain about making a decision to move him to comfort care

, will reassess if needed, monitor





3/16


seen early this am, little more responsive to strong stim


just saw MRI brain, multiple bilateral contusions, left frontal subdural (let 

NS review)


the mri itself is not appearing disastrous


therefore if he survives through extensive care he might achieve some fair 

neurologic function


if needed dr Odonnell covering weekend


did not talk to the wife this morning as she was not in the hospital during 

rounds





Subjective


Subjective Comments


MRI done, reviewed, no report yet


Active Medications





Current Medications








 Medications


  (Trade)  Dose


 Ordered  Sig/Kirby


 Route  Start Time


 Stop Time Status Last Admin


 


  (NS Flush)  2 ml  UNSCH  PRN


 IV FLUSH  3/8/18 21:00


     


 


 


  (Vasotec Inj)  1.25 mg  Q8H  PRN


 IV PUSH  3/8/18 21:00


    3/14/18 22:07


 


 


  (Zofran Inj)  4 mg  Q6H  PRN


 IV PUSH  3/8/18 21:00


     


 


 


  (Protonix Inj)  40 mg  Q24H


 IVP  3/8/18 21:00


    3/15/18 20:54


 


 


  (Baciguent Oint)  1 applic  BID


 TOP  3/8/18 21:00


    3/16/18 10:20


 


 


  (Colace)  100 mg  BID


 PO  3/8/18 21:00


    3/16/18 09:00


 


 


 Miscellaneous


 Information  1  Q361D


 XX  3/8/18 21:00


    3/8/18 21:00


 


 


  (Chlorhexidine


 2% Cloth)  


 Taper  DAILY@04


 TOP  3/9/18 04:00


 3/5/19 03:59   


 


 


  (Chlorhexidine


 2% Cloth)  3 pack  UNSCH  PRN


 TOP  3/8/18 21:00


     


 


 


 Potassium Chloride  100 ml @ 


 50 mls/hr  Q2H  PRN


 IV  3/8/18 21:45


    3/15/18 05:38


 


 


 Potassium Chloride  100 ml @ 


 50 mls/hr  Q2H  PRN


 IV  3/8/18 21:45


     


 


 


  (K-Lyte Cl  Eff)  50 meq  UNSCH  PRN


 PO  3/8/18 21:45


     


 


 


 Potassium Chloride  100 ml @ 


 25 mls/hr  UNSCH  PRN


 IV  3/8/18 21:45


    3/13/18 05:08


 


 


 Potassium Chloride  100 ml @ 


 50 mls/hr  Q2H  PRN


 IV  3/8/18 21:45


    3/16/18 05:14


 


 


 Magnesium Sulfate


 4 gm/Sodium


 Chloride  100 ml @ 


 50 mls/hr  UNSCH  PRN


 IV  3/8/18 21:45


     


 


 


  (Mag-Ox)  800 mg  UNSCH  PRN


 PO  3/8/18 21:45


     


 


 


 Magnesium Sulfate


 2 gm/Sodium


 Chloride  100 ml @ 


 50 mls/hr  UNSCH  PRN


 IV  3/8/18 21:45


    3/10/18 12:19


 


 


  (K-Phos)  2,000 mg  Q4H  PRN


 PO  3/8/18 21:45


     


 


 


 Sodium Phosphate


 30 mmol/Sodium


 Chloride  250 ml @ 


 42 mls/hr  UNSCH  PRN


 IV  3/8/18 21:45


     


 


 


  (K-Phos)  2,000 mg  UNSCH  PRN


 PO/TUBE  3/8/18 21:45


     


 


 


 Potassium


 Phosphate 30 mmol/


 Sodium Chloride  260 ml @ 


 42 mls/hr  UNSCH  PRN


 IV  3/8/18 21:45


    3/9/18 20:46


 


 


  (Peridex 0.12%


 Liq)  15 ml  BID@08,20


 MT  3/9/18 08:00


    3/16/18 08:00


 


 


 Fentanyl Citrate  250 ml @ 5


 mls/hr  TITRATE  PRN


 IV  3/8/18 22:00


    3/16/18 10:15


 


 


 Propofol  100 ml @ 


 3.135 mls/


 hr  TITRATE  PRN


 IV  3/8/18 22:00


    3/16/18 10:15


 


 


  (Brethine Inj)  1 mg  UNSCH  PRN


 SQ  3/8/18 23:15


     


 


 


 Levetriacetam 500


 mg/Sodium Chloride  105 ml @ 


 420 mls/hr  Q12HR


 IV  3/9/18 09:00


    3/16/18 10:16


 


 


  (Milk Of


 Magnesia Liq)  30 ml  BID


 PO  3/9/18 09:00


    3/16/18 10:16


 


 


  (Lasix Inj)  40 mg  DAILY


 IV PUSH  3/11/18 10:30


    3/16/18 10:18


 


 


  (Apresoline Inj)  20 mg  Q6H  PRN


 IV PUSH  3/14/18 12:45


    3/16/18 04:27


 


 


  (Tears Naturale


 Opth Soln)  1 drop  Q4H  PRN


 EACH EYE  3/16/18 09:45


     


 








Allergies





Allergies


Coded Allergies


  No Allergy Information Available (Unverified3/8/18)





Exam


I&O / VS





Vital Signs








  Date Time  Temp Pulse Resp B/P (MAP) Pulse Ox O2 Delivery O2 Flow Rate FiO2


 


3/16/18 12:08     98   35


 


3/16/18 12:00 98.2 96 17 150/74 (99) 99   


 


3/16/18 12:00        40


 


3/16/18 08:36     98   40


 


3/16/18 08:00 99.0       


 


3/16/18 08:00        40


 


3/16/18 07:00     98 Mechanical Ventilator  40


 


3/16/18 06:30  133      


 


3/16/18 04:05     98   40


 


3/16/18 04:00 99.5 123 20 135/68 (90) 98   


 


3/16/18 04:00        40


 


3/16/18 04:00  110      


 


3/16/18 02:00  87      


 


3/16/18 01:33     99   40


 


3/16/18 00:14  90      


 


3/16/18 00:00 99.5 85 16 137/65 (89) 98   


 


3/16/18 00:00        40


 


3/15/18 22:00  90      


 


3/15/18 20:28     98   40


 


3/15/18 20:00 99.7 90 28 137/59 (85) 97   


 


3/15/18 20:00        40


 


3/15/18 19:00     98 Mechanical Ventilator  40


 


3/15/18 18:00  88      


 


3/15/18 16:32     100   30











Objective


Micro and Labs





Laboratory Tests








Test


  3/15/18


18:48 3/16/18


03:35


 


Potassium Level 3.4  3.7 


 


White Blood Count  11.3 


 


Red Blood Count  3.06 


 


Hemoglobin  9.0 


 


Hematocrit  25.6 


 


Mean Corpuscular Volume  83.7 


 


Mean Corpuscular Hemoglobin  29.3 


 


Mean Corpuscular Hemoglobin


Concent 


  35.0 


 


 


Red Cell Distribution Width  19.4 


 


Platelet Count  205 


 


Mean Platelet Volume  7.8 


 


Neutrophils (%) (Auto)  73.3 


 


Lymphocytes (%) (Auto)  12.9 


 


Monocytes (%) (Auto)  12.5 


 


Eosinophils (%) (Auto)  1.1 


 


Basophils (%) (Auto)  0.2 


 


Neutrophils # (Auto)  8.3 


 


Lymphocytes # (Auto)  1.5 


 


Monocytes # (Auto)  1.4 


 


Eosinophils # (Auto)  0.1 


 


Basophils # (Auto)  0.0 


 


CBC Comment  AUTO DIFF 


 


Differential Total Cells


Counted 


  100 


 


 


Neutrophils % (Manual)  70 


 


Band Neutrophils %  13 


 


Lymphocytes %  7 


 


Monocytes %  5 


 


Eosinophils %  1 


 


Neutrophils # (Manual)  9.8 


 


Metamyelocytes  4 


 


Nucleated Red Blood Cells  1 


 


Differential Comment


  


  FINAL DIFF


MANUAL


 


Platelet Estimate  NORMAL 


 


Platelet Morphology Comment  NORMAL 


 


Blood Urea Nitrogen  29 


 


Creatinine  0.76 


 


Random Glucose  104 


 


Total Protein  5.8 


 


Albumin  2.4 


 


Calcium Level  8.2 


 


Alkaline Phosphatase  66 


 


Aspartate Amino Transf


(AST/SGOT) 


  63 


 


 


Alanine Aminotransferase


(ALT/SGPT) 


  21 


 


 


Total Bilirubin  0.7 


 


Sodium Level  155 


 


Chloride Level  118 


 


Carbon Dioxide Level  28.1 


 


Anion Gap  9 


 


Estimat Glomerular Filtration


Rate 


  102 


 

















Justus Marin MD Mar 16, 2018 16:23

## 2018-03-17 VITALS
RESPIRATION RATE: 16 BRPM | TEMPERATURE: 99.9 F | DIASTOLIC BLOOD PRESSURE: 67 MMHG | SYSTOLIC BLOOD PRESSURE: 126 MMHG | OXYGEN SATURATION: 95 % | HEART RATE: 101 BPM

## 2018-03-17 VITALS
SYSTOLIC BLOOD PRESSURE: 108 MMHG | OXYGEN SATURATION: 96 % | DIASTOLIC BLOOD PRESSURE: 56 MMHG | TEMPERATURE: 100 F | HEART RATE: 92 BPM | RESPIRATION RATE: 16 BRPM

## 2018-03-17 VITALS
OXYGEN SATURATION: 100 % | SYSTOLIC BLOOD PRESSURE: 122 MMHG | TEMPERATURE: 99.5 F | HEART RATE: 97 BPM | DIASTOLIC BLOOD PRESSURE: 66 MMHG | RESPIRATION RATE: 16 BRPM

## 2018-03-17 VITALS
OXYGEN SATURATION: 100 % | DIASTOLIC BLOOD PRESSURE: 64 MMHG | HEART RATE: 90 BPM | TEMPERATURE: 99.9 F | SYSTOLIC BLOOD PRESSURE: 130 MMHG | RESPIRATION RATE: 16 BRPM

## 2018-03-17 VITALS
HEART RATE: 96 BPM | TEMPERATURE: 99.9 F | OXYGEN SATURATION: 94 % | RESPIRATION RATE: 16 BRPM | DIASTOLIC BLOOD PRESSURE: 78 MMHG | SYSTOLIC BLOOD PRESSURE: 158 MMHG

## 2018-03-17 VITALS — HEART RATE: 92 BPM

## 2018-03-17 VITALS — OXYGEN SATURATION: 99 %

## 2018-03-17 VITALS — HEART RATE: 99 BPM

## 2018-03-17 VITALS — OXYGEN SATURATION: 100 %

## 2018-03-17 VITALS — OXYGEN SATURATION: 95 %

## 2018-03-17 VITALS — OXYGEN SATURATION: 94 %

## 2018-03-17 VITALS
SYSTOLIC BLOOD PRESSURE: 147 MMHG | DIASTOLIC BLOOD PRESSURE: 70 MMHG | OXYGEN SATURATION: 100 % | TEMPERATURE: 99.5 F | HEART RATE: 105 BPM | RESPIRATION RATE: 16 BRPM

## 2018-03-17 VITALS — HEART RATE: 96 BPM

## 2018-03-17 VITALS — HEART RATE: 93 BPM

## 2018-03-17 LAB
ALBUMIN SERPL-MCNC: 2.2 GM/DL (ref 3.4–5)
ALP SERPL-CCNC: 75 U/L (ref 45–117)
ALT SERPL-CCNC: 36 U/L (ref 12–78)
AST SERPL-CCNC: 93 U/L (ref 15–37)
BASOPHILS # BLD AUTO: 0 TH/MM3 (ref 0–0.2)
BASOPHILS NFR BLD: 0.3 % (ref 0–2)
BILIRUB SERPL-MCNC: 0.7 MG/DL (ref 0.2–1)
BUN SERPL-MCNC: 31 MG/DL (ref 7–18)
CALCIUM SERPL-MCNC: 8.1 MG/DL (ref 8.5–10.1)
CHLORIDE SERPL-SCNC: 119 MEQ/L (ref 98–107)
CREAT SERPL-MCNC: 0.77 MG/DL (ref 0.6–1.3)
EOSINOPHIL # BLD: 0.3 TH/MM3 (ref 0–0.4)
EOSINOPHIL NFR BLD: 3 % (ref 0–4)
ERYTHROCYTE [DISTWIDTH] IN BLOOD BY AUTOMATED COUNT: 19.1 % (ref 11.6–17.2)
GFR SERPLBLD BASED ON 1.73 SQ M-ARVRAT: 101 ML/MIN (ref 89–?)
GLUCOSE SERPL-MCNC: 151 MG/DL (ref 74–106)
HCO3 BLD-SCNC: 25.7 MEQ/L (ref 21–32)
HCT VFR BLD CALC: 26.6 % (ref 39–51)
HGB BLD-MCNC: 9 GM/DL (ref 13–17)
LYMPHOCYTES # BLD AUTO: 1.8 TH/MM3 (ref 1–4.8)
LYMPHOCYTES NFR BLD AUTO: 18.4 % (ref 9–44)
MCH RBC QN AUTO: 28.8 PG (ref 27–34)
MCHC RBC AUTO-ENTMCNC: 33.7 % (ref 32–36)
MCV RBC AUTO: 85.4 FL (ref 80–100)
MONOCYTE #: 0.8 TH/MM3 (ref 0–0.9)
MONOCYTES NFR BLD: 8.3 % (ref 0–8)
NEUTROPHILS # BLD AUTO: 6.9 TH/MM3 (ref 1.8–7.7)
NEUTROPHILS NFR BLD AUTO: 70 % (ref 16–70)
PLATELET # BLD: 233 TH/MM3 (ref 150–450)
PMV BLD AUTO: 7.8 FL (ref 7–11)
PROT SERPL-MCNC: 5.5 GM/DL (ref 6.4–8.2)
RBC # BLD AUTO: 3.11 MIL/MM3 (ref 4.5–5.9)
SODIUM SERPL-SCNC: 154 MEQ/L (ref 136–145)
WBC # BLD AUTO: 9.9 TH/MM3 (ref 4–11)

## 2018-03-17 RX ADMIN — METOPROLOL TARTRATE SCH MG: 25 TABLET, FILM COATED ORAL at 11:46

## 2018-03-17 RX ADMIN — OXYCODONE HYDROCHLORIDE SCH MG: 100 SOLUTION ORAL at 18:00

## 2018-03-17 RX ADMIN — POTASSIUM CHLORIDE PRN MLS/HR: 200 INJECTION, SOLUTION INTRAVENOUS at 06:29

## 2018-03-17 RX ADMIN — CHLORHEXIDINE GLUCONATE SCH PACK: 500 CLOTH TOPICAL at 03:03

## 2018-03-17 RX ADMIN — OXYCODONE HYDROCHLORIDE SCH MG: 100 SOLUTION ORAL at 15:00

## 2018-03-17 RX ADMIN — PROPOFOL PRN MLS/HR: 10 INJECTION, EMULSION INTRAVENOUS at 03:04

## 2018-03-17 RX ADMIN — BACITRACIN SCH APPLIC: 500 OINTMENT TOPICAL at 21:00

## 2018-03-17 RX ADMIN — MAGNESIUM HYDROXIDE SCH ML: 400 SUSPENSION ORAL at 09:00

## 2018-03-17 RX ADMIN — BACITRACIN SCH APPLIC: 500 OINTMENT TOPICAL at 09:00

## 2018-03-17 RX ADMIN — LEVETIRACETAM SCH MLS/HR: 100 INJECTION, SOLUTION, CONCENTRATE INTRAVENOUS at 21:00

## 2018-03-17 RX ADMIN — PROPOFOL PRN MLS/HR: 10 INJECTION, EMULSION INTRAVENOUS at 18:00

## 2018-03-17 RX ADMIN — CHLORHEXIDINE GLUCONATE 0.12% ORAL RINSE SCH ML: 1.2 LIQUID ORAL at 08:00

## 2018-03-17 RX ADMIN — OXYCODONE HYDROCHLORIDE SCH MG: 100 SOLUTION ORAL at 19:06

## 2018-03-17 RX ADMIN — CHLORHEXIDINE GLUCONATE 0.12% ORAL RINSE SCH ML: 1.2 LIQUID ORAL at 20:00

## 2018-03-17 RX ADMIN — PROPOFOL PRN MLS/HR: 10 INJECTION, EMULSION INTRAVENOUS at 06:33

## 2018-03-17 RX ADMIN — MAGNESIUM HYDROXIDE SCH ML: 400 SUSPENSION ORAL at 21:00

## 2018-03-17 RX ADMIN — PANTOPRAZOLE SODIUM SCH MG: 40 INJECTION, POWDER, FOR SOLUTION INTRAVENOUS at 21:00

## 2018-03-17 RX ADMIN — PROPOFOL PRN MLS/HR: 10 INJECTION, EMULSION INTRAVENOUS at 13:00

## 2018-03-17 RX ADMIN — METOPROLOL TARTRATE SCH MG: 25 TABLET, FILM COATED ORAL at 21:00

## 2018-03-17 RX ADMIN — OXYCODONE HYDROCHLORIDE SCH MG: 100 SOLUTION ORAL at 11:46

## 2018-03-17 RX ADMIN — DOCUSATE SODIUM SCH MG: 100 CAPSULE, LIQUID FILLED ORAL at 21:00

## 2018-03-17 RX ADMIN — LEVETIRACETAM SCH MLS/HR: 100 INJECTION, SOLUTION, CONCENTRATE INTRAVENOUS at 09:00

## 2018-03-17 RX ADMIN — DOCUSATE SODIUM SCH MG: 100 CAPSULE, LIQUID FILLED ORAL at 09:00

## 2018-03-17 RX ADMIN — FUROSEMIDE SCH MG: 10 INJECTION, SOLUTION INTRAMUSCULAR; INTRAVENOUS at 09:00
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
## 2018-03-18 VITALS
HEART RATE: 96 BPM | OXYGEN SATURATION: 95 % | DIASTOLIC BLOOD PRESSURE: 66 MMHG | RESPIRATION RATE: 16 BRPM | SYSTOLIC BLOOD PRESSURE: 132 MMHG | TEMPERATURE: 100 F

## 2018-03-18 VITALS
OXYGEN SATURATION: 95 % | TEMPERATURE: 100.2 F | RESPIRATION RATE: 16 BRPM | HEART RATE: 91 BPM | SYSTOLIC BLOOD PRESSURE: 130 MMHG | DIASTOLIC BLOOD PRESSURE: 73 MMHG

## 2018-03-18 VITALS
HEART RATE: 95 BPM | RESPIRATION RATE: 16 BRPM | OXYGEN SATURATION: 97 % | DIASTOLIC BLOOD PRESSURE: 66 MMHG | SYSTOLIC BLOOD PRESSURE: 138 MMHG | TEMPERATURE: 99.7 F

## 2018-03-18 VITALS
RESPIRATION RATE: 16 BRPM | DIASTOLIC BLOOD PRESSURE: 82 MMHG | OXYGEN SATURATION: 97 % | HEART RATE: 104 BPM | SYSTOLIC BLOOD PRESSURE: 142 MMHG | TEMPERATURE: 100.8 F

## 2018-03-18 VITALS — HEART RATE: 88 BPM

## 2018-03-18 VITALS — HEART RATE: 98 BPM

## 2018-03-18 VITALS
TEMPERATURE: 100.2 F | HEART RATE: 94 BPM | OXYGEN SATURATION: 100 % | SYSTOLIC BLOOD PRESSURE: 115 MMHG | RESPIRATION RATE: 16 BRPM | DIASTOLIC BLOOD PRESSURE: 60 MMHG

## 2018-03-18 VITALS — OXYGEN SATURATION: 95 %

## 2018-03-18 VITALS
TEMPERATURE: 100 F | HEART RATE: 94 BPM | RESPIRATION RATE: 16 BRPM | SYSTOLIC BLOOD PRESSURE: 127 MMHG | OXYGEN SATURATION: 98 % | DIASTOLIC BLOOD PRESSURE: 60 MMHG

## 2018-03-18 VITALS — OXYGEN SATURATION: 96 %

## 2018-03-18 VITALS — OXYGEN SATURATION: 98 %

## 2018-03-18 VITALS — HEART RATE: 93 BPM

## 2018-03-18 VITALS — HEART RATE: 91 BPM

## 2018-03-18 VITALS — HEART RATE: 100 BPM

## 2018-03-18 LAB
ALBUMIN SERPL-MCNC: 2.1 GM/DL (ref 3.4–5)
ALP SERPL-CCNC: 86 U/L (ref 45–117)
ALT SERPL-CCNC: 41 U/L (ref 12–78)
AST SERPL-CCNC: 81 U/L (ref 15–37)
BASOPHILS # BLD AUTO: 0 TH/MM3 (ref 0–0.2)
BASOPHILS NFR BLD: 0.2 % (ref 0–2)
BILIRUB SERPL-MCNC: 0.6 MG/DL (ref 0.2–1)
BUN SERPL-MCNC: 27 MG/DL (ref 7–18)
CALCIUM SERPL-MCNC: 8 MG/DL (ref 8.5–10.1)
CHLORIDE SERPL-SCNC: 119 MEQ/L (ref 98–107)
CREAT SERPL-MCNC: 0.81 MG/DL (ref 0.6–1.3)
EOSINOPHIL # BLD: 0.5 TH/MM3 (ref 0–0.4)
EOSINOPHIL NFR BLD: 4.6 % (ref 0–4)
ERYTHROCYTE [DISTWIDTH] IN BLOOD BY AUTOMATED COUNT: 20.4 % (ref 11.6–17.2)
GFR SERPLBLD BASED ON 1.73 SQ M-ARVRAT: 95 ML/MIN (ref 89–?)
GLUCOSE SERPL-MCNC: 163 MG/DL (ref 74–106)
HCO3 BLD-SCNC: 25.5 MEQ/L (ref 21–32)
HCT VFR BLD CALC: 27.1 % (ref 39–51)
HGB BLD-MCNC: 8.9 GM/DL (ref 13–17)
LYMPHOCYTES # BLD AUTO: 1.7 TH/MM3 (ref 1–4.8)
LYMPHOCYTES NFR BLD AUTO: 15.6 % (ref 9–44)
MCH RBC QN AUTO: 27.8 PG (ref 27–34)
MCHC RBC AUTO-ENTMCNC: 32.8 % (ref 32–36)
MCV RBC AUTO: 84.9 FL (ref 80–100)
MONOCYTE #: 0.9 TH/MM3 (ref 0–0.9)
MONOCYTES NFR BLD: 8.4 % (ref 0–8)
NEUTROPHILS # BLD AUTO: 7.8 TH/MM3 (ref 1.8–7.7)
NEUTROPHILS NFR BLD AUTO: 71.2 % (ref 16–70)
PLATELET # BLD: 238 TH/MM3 (ref 150–450)
PMV BLD AUTO: 8 FL (ref 7–11)
PROT SERPL-MCNC: 5.4 GM/DL (ref 6.4–8.2)
RBC # BLD AUTO: 3.19 MIL/MM3 (ref 4.5–5.9)
SODIUM SERPL-SCNC: 153 MEQ/L (ref 136–145)
WBC # BLD AUTO: 11 TH/MM3 (ref 4–11)

## 2018-03-18 RX ADMIN — METOPROLOL TARTRATE SCH MG: 25 TABLET, FILM COATED ORAL at 21:10

## 2018-03-18 RX ADMIN — PANTOPRAZOLE SODIUM SCH MG: 40 INJECTION, POWDER, FOR SOLUTION INTRAVENOUS at 21:10

## 2018-03-18 RX ADMIN — Medication PRN MLS/HR: at 14:51

## 2018-03-18 RX ADMIN — OXYCODONE HYDROCHLORIDE SCH MG: 100 SOLUTION ORAL at 14:51

## 2018-03-18 RX ADMIN — PROPOFOL PRN MLS/HR: 10 INJECTION, EMULSION INTRAVENOUS at 06:14

## 2018-03-18 RX ADMIN — BACITRACIN SCH APPLIC: 500 OINTMENT TOPICAL at 08:41

## 2018-03-18 RX ADMIN — OXYCODONE HYDROCHLORIDE SCH MG: 100 SOLUTION ORAL at 14:52

## 2018-03-18 RX ADMIN — CHLORHEXIDINE GLUCONATE 0.12% ORAL RINSE SCH ML: 1.2 LIQUID ORAL at 08:41

## 2018-03-18 RX ADMIN — DOCUSATE SODIUM SCH MG: 100 CAPSULE, LIQUID FILLED ORAL at 21:10

## 2018-03-18 RX ADMIN — OXYCODONE HYDROCHLORIDE SCH MG: 100 SOLUTION ORAL at 22:36

## 2018-03-18 RX ADMIN — LEVETIRACETAM SCH MLS/HR: 100 INJECTION, SOLUTION, CONCENTRATE INTRAVENOUS at 08:39

## 2018-03-18 RX ADMIN — CHLORHEXIDINE GLUCONATE SCH PACK: 500 CLOTH TOPICAL at 03:53

## 2018-03-18 RX ADMIN — CHLORHEXIDINE GLUCONATE 0.12% ORAL RINSE SCH ML: 1.2 LIQUID ORAL at 20:00

## 2018-03-18 RX ADMIN — LEVETIRACETAM SCH MLS/HR: 100 INJECTION, SOLUTION, CONCENTRATE INTRAVENOUS at 21:10

## 2018-03-18 RX ADMIN — MAGNESIUM HYDROXIDE SCH ML: 400 SUSPENSION ORAL at 08:40

## 2018-03-18 RX ADMIN — FUROSEMIDE SCH MG: 10 INJECTION, SOLUTION INTRAMUSCULAR; INTRAVENOUS at 08:40

## 2018-03-18 RX ADMIN — METOPROLOL TARTRATE SCH MG: 25 TABLET, FILM COATED ORAL at 08:39

## 2018-03-18 RX ADMIN — MAGNESIUM HYDROXIDE SCH ML: 400 SUSPENSION ORAL at 21:09

## 2018-03-18 RX ADMIN — BACITRACIN SCH APPLIC: 500 OINTMENT TOPICAL at 21:11

## 2018-03-18 RX ADMIN — OXYCODONE HYDROCHLORIDE SCH MG: 100 SOLUTION ORAL at 06:12

## 2018-03-18 RX ADMIN — PROPOFOL PRN MLS/HR: 10 INJECTION, EMULSION INTRAVENOUS at 00:00

## 2018-03-18 RX ADMIN — PROPOFOL PRN MLS/HR: 10 INJECTION, EMULSION INTRAVENOUS at 14:50

## 2018-03-18 RX ADMIN — Medication PRN MLS/HR: at 06:15

## 2018-03-18 RX ADMIN — OXYCODONE HYDROCHLORIDE SCH MG: 100 SOLUTION ORAL at 18:44

## 2018-03-18 RX ADMIN — DOCUSATE SODIUM SCH MG: 100 CAPSULE, LIQUID FILLED ORAL at 08:41

## 2018-03-18 RX ADMIN — OXYCODONE HYDROCHLORIDE SCH MG: 100 SOLUTION ORAL at 03:00

## 2018-03-18 RX ADMIN — POTASSIUM CHLORIDE PRN MLS/HR: 400 INJECTION, SOLUTION INTRAVENOUS at 06:31
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
## 2018-03-19 VITALS — OXYGEN SATURATION: 100 %

## 2018-03-19 VITALS
SYSTOLIC BLOOD PRESSURE: 162 MMHG | OXYGEN SATURATION: 97 % | RESPIRATION RATE: 16 BRPM | DIASTOLIC BLOOD PRESSURE: 82 MMHG | HEART RATE: 113 BPM | TEMPERATURE: 99.7 F

## 2018-03-19 VITALS
OXYGEN SATURATION: 97 % | SYSTOLIC BLOOD PRESSURE: 157 MMHG | RESPIRATION RATE: 25 BRPM | TEMPERATURE: 99.9 F | HEART RATE: 116 BPM | DIASTOLIC BLOOD PRESSURE: 90 MMHG

## 2018-03-19 VITALS — OXYGEN SATURATION: 95 %

## 2018-03-19 VITALS
DIASTOLIC BLOOD PRESSURE: 91 MMHG | HEART RATE: 109 BPM | TEMPERATURE: 99.9 F | OXYGEN SATURATION: 99 % | SYSTOLIC BLOOD PRESSURE: 174 MMHG | RESPIRATION RATE: 16 BRPM

## 2018-03-19 VITALS
RESPIRATION RATE: 16 BRPM | SYSTOLIC BLOOD PRESSURE: 136 MMHG | OXYGEN SATURATION: 100 % | TEMPERATURE: 100 F | HEART RATE: 98 BPM | DIASTOLIC BLOOD PRESSURE: 70 MMHG

## 2018-03-19 VITALS — HEART RATE: 103 BPM

## 2018-03-19 VITALS
SYSTOLIC BLOOD PRESSURE: 142 MMHG | HEART RATE: 99 BPM | DIASTOLIC BLOOD PRESSURE: 73 MMHG | TEMPERATURE: 99.9 F | OXYGEN SATURATION: 96 % | RESPIRATION RATE: 16 BRPM

## 2018-03-19 VITALS — OXYGEN SATURATION: 96 %

## 2018-03-19 VITALS — HEART RATE: 97 BPM

## 2018-03-19 VITALS — HEART RATE: 117 BPM

## 2018-03-19 VITALS — OXYGEN SATURATION: 99 %

## 2018-03-19 VITALS — OXYGEN SATURATION: 97 %

## 2018-03-19 VITALS — OXYGEN SATURATION: 98 %

## 2018-03-19 VITALS — HEART RATE: 96 BPM

## 2018-03-19 VITALS — HEART RATE: 98 BPM

## 2018-03-19 VITALS — HEART RATE: 101 BPM

## 2018-03-19 LAB
ALBUMIN SERPL-MCNC: 2.1 GM/DL (ref 3.4–5)
ALP SERPL-CCNC: 98 U/L (ref 45–117)
ALT SERPL-CCNC: 38 U/L (ref 12–78)
AST SERPL-CCNC: 54 U/L (ref 15–37)
BASOPHILS # BLD AUTO: 0.1 TH/MM3 (ref 0–0.2)
BASOPHILS NFR BLD: 0.4 % (ref 0–2)
BILIRUB SERPL-MCNC: 0.5 MG/DL (ref 0.2–1)
BUN SERPL-MCNC: 28 MG/DL (ref 7–18)
CALCIUM SERPL-MCNC: 7.9 MG/DL (ref 8.5–10.1)
CHLORIDE SERPL-SCNC: 116 MEQ/L (ref 98–107)
CREAT SERPL-MCNC: 0.79 MG/DL (ref 0.6–1.3)
EOSINOPHIL # BLD: 0.6 TH/MM3 (ref 0–0.4)
EOSINOPHIL NFR BLD: 4.1 % (ref 0–4)
ERYTHROCYTE [DISTWIDTH] IN BLOOD BY AUTOMATED COUNT: 20 % (ref 11.6–17.2)
GFR SERPLBLD BASED ON 1.73 SQ M-ARVRAT: 98 ML/MIN (ref 89–?)
GLUCOSE SERPL-MCNC: 178 MG/DL (ref 74–106)
HCO3 BLD-SCNC: 27.7 MEQ/L (ref 21–32)
HCT VFR BLD CALC: 27.8 % (ref 39–51)
HGB BLD-MCNC: 9.1 GM/DL (ref 13–17)
LYMPHOCYTES # BLD AUTO: 1.8 TH/MM3 (ref 1–4.8)
LYMPHOCYTES NFR BLD AUTO: 12.8 % (ref 9–44)
MCH RBC QN AUTO: 27.9 PG (ref 27–34)
MCHC RBC AUTO-ENTMCNC: 32.9 % (ref 32–36)
MCV RBC AUTO: 85 FL (ref 80–100)
MONOCYTE #: 1.1 TH/MM3 (ref 0–0.9)
MONOCYTES NFR BLD: 7.7 % (ref 0–8)
NEUTROPHILS # BLD AUTO: 10.6 TH/MM3 (ref 1.8–7.7)
NEUTROPHILS NFR BLD AUTO: 75 % (ref 16–70)
PLATELET # BLD: 243 TH/MM3 (ref 150–450)
PMV BLD AUTO: 8.1 FL (ref 7–11)
PROT SERPL-MCNC: 6 GM/DL (ref 6.4–8.2)
RBC # BLD AUTO: 3.27 MIL/MM3 (ref 4.5–5.9)
SODIUM SERPL-SCNC: 150 MEQ/L (ref 136–145)
WBC # BLD AUTO: 14.1 TH/MM3 (ref 4–11)

## 2018-03-19 RX ADMIN — PROPOFOL PRN MLS/HR: 10 INJECTION, EMULSION INTRAVENOUS at 17:43

## 2018-03-19 RX ADMIN — FUROSEMIDE SCH MG: 10 INJECTION, SOLUTION INTRAMUSCULAR; INTRAVENOUS at 08:22

## 2018-03-19 RX ADMIN — LEVETIRACETAM SCH MLS/HR: 100 INJECTION, SOLUTION, CONCENTRATE INTRAVENOUS at 08:21

## 2018-03-19 RX ADMIN — METOPROLOL TARTRATE SCH MG: 25 TABLET, FILM COATED ORAL at 08:22

## 2018-03-19 RX ADMIN — LEVETIRACETAM SCH MLS/HR: 100 INJECTION, SOLUTION, CONCENTRATE INTRAVENOUS at 23:37

## 2018-03-19 RX ADMIN — MAGNESIUM HYDROXIDE SCH ML: 400 SUSPENSION ORAL at 08:22

## 2018-03-19 RX ADMIN — OXYCODONE HYDROCHLORIDE SCH MG: 100 SOLUTION ORAL at 06:24

## 2018-03-19 RX ADMIN — METOPROLOL TARTRATE SCH MG: 25 TABLET, FILM COATED ORAL at 23:38

## 2018-03-19 RX ADMIN — CHLORHEXIDINE GLUCONATE 0.12% ORAL RINSE SCH ML: 1.2 LIQUID ORAL at 08:21

## 2018-03-19 RX ADMIN — OXYCODONE HYDROCHLORIDE SCH MG: 100 SOLUTION ORAL at 02:22

## 2018-03-19 RX ADMIN — CHLORHEXIDINE GLUCONATE SCH PACK: 500 CLOTH TOPICAL at 04:00

## 2018-03-19 RX ADMIN — DOCUSATE SODIUM SCH MG: 100 CAPSULE, LIQUID FILLED ORAL at 23:38

## 2018-03-19 RX ADMIN — MAGNESIUM HYDROXIDE SCH ML: 400 SUSPENSION ORAL at 21:00

## 2018-03-19 RX ADMIN — OXYCODONE HYDROCHLORIDE SCH MG: 100 SOLUTION ORAL at 18:49

## 2018-03-19 RX ADMIN — DOCUSATE SODIUM SCH MG: 100 CAPSULE, LIQUID FILLED ORAL at 08:22

## 2018-03-19 RX ADMIN — CHLORHEXIDINE GLUCONATE 0.12% ORAL RINSE SCH ML: 1.2 LIQUID ORAL at 20:00

## 2018-03-19 RX ADMIN — OXYCODONE HYDROCHLORIDE SCH MG: 100 SOLUTION ORAL at 23:38

## 2018-03-19 RX ADMIN — OXYCODONE HYDROCHLORIDE SCH MG: 100 SOLUTION ORAL at 11:50

## 2018-03-19 RX ADMIN — Medication PRN MLS/HR: at 17:44

## 2018-03-19 RX ADMIN — OXYCODONE HYDROCHLORIDE SCH MG: 100 SOLUTION ORAL at 16:59

## 2018-03-19 RX ADMIN — BACITRACIN SCH APPLIC: 500 OINTMENT TOPICAL at 21:00

## 2018-03-19 RX ADMIN — PANTOPRAZOLE SODIUM SCH MG: 40 INJECTION, POWDER, FOR SOLUTION INTRAVENOUS at 23:38

## 2018-03-19 RX ADMIN — BACITRACIN SCH APPLIC: 500 OINTMENT TOPICAL at 08:22
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
## 2018-03-19 NOTE — RADRPT
EXAM DATE/TIME:  03/19/2018 21:02 

 

[facility] COMPARISON:     

No previous studies available for comparison.

 

 

INDICATIONS :     

Evaluate fractures.

                                                                     

     ; Reconstructed from previous dataset, no dose                        

 

 

MEDICAL HISTORY :     

None   

 

SURGICAL HISTORY :      

None.   

 

ENCOUNTER:     

Subsequent

 

ACUITY:     

1 week

 

PAIN SCALE:     

Non-responsive

 

LOCATION:       

facial 

 

TECHNIQUE:     

3D reconstructions of the facial bones were performed.  DICOM format image data is available electron
[facility] for review and comparison.  

 

FINDINGS:     

Numerous facial bone fractures identified. See facial bone CT report for description of individual fr
actures.

 

CONCLUSION:     

3-D reconstruction of facial bone fractures. See facial bone CT report 

 

 

 Jeremie Cummings MD on March 19, 2018 at 21:49           

Board Certified Radiologist.

 This report was verified electronically.
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
## 2018-03-19 NOTE — [email or web address]
Subjective


Subjective Remarks


s/p MCA


right open distal femur s/p exfix


right tibial plateau s/p exfix


right BBFA s/p ORIF - POD 6


left humerus





Objective


Vitals





Vital Signs








  Date Time  Temp Pulse Resp B/P (MAP) Pulse Ox O2 Delivery O2 Flow Rate FiO2


 


3/19/18 06:26  97      


 


3/19/18 05:53 99.9 99 16 142/73 (96) 96   


 


3/19/18 05:53        40


 


3/19/18 05:52  98      


 


3/19/18 05:07     95   40


 


3/19/18 02:25        40


 


3/19/18 02:25 100.0 98 16 136/70 (92) 100   


 


3/19/18 02:24  96      


 


3/18/18 23:58     96   40


 


3/18/18 22:07  91      


 


3/18/18 21:59     95 Mechanical Ventilator  40


 


3/18/18 20:57     96   40


 


3/18/18 20:00        40


 


3/18/18 20:00 100.2 91 16 130/73 (92) 95   


 


3/18/18 18:00  100      


 


3/18/18 16:00 100.8 104 16 142/82 (102) 97   


 


3/18/18 16:00        40


 


3/18/18 16:00  104      


 


3/18/18 14:00  98      


 


3/18/18 12:23     95   40


 


3/18/18 12:00 100.0 96 16 132/66 (88) 95   


 


3/18/18 12:00  96      


 


3/18/18 12:00        40


 


3/18/18 10:00  88      


 


3/18/18 09:26     98   40


 


3/18/18 08:00        50


 


3/18/18 08:00 99.7 96 16 138/66 (90) 97   


 


3/18/18 08:00  95      














I/O      


 


 3/18/18 3/18/18 3/18/18 3/19/18 3/19/18 3/19/18





 07:00 15:00 23:00 07:00 15:00 23:00


 


Intake Total 1531 ml  846 ml 753 ml  


 


Output Total 1600 ml  2400 ml 1200 ml  


 


Balance -69 ml  -1554 ml -447 ml  


 


      


 


IV Total 555 ml     


 


Tube Feeding 776 ml  696 ml 553 ml  


 


Other 200 ml  150 ml 200 ml  


 


Output Urine Total 1600 ml  2400 ml 1200 ml  


 


Stool Total 0 ml  0 ml 0 ml  








Result Diagram:  


3/19/18 0300                                                                   

             3/19/18 0300





Imaging





Last 24 hours Impressions








Chest X-Ray 3/9/18 0000 Signed





Impressions: 





 Service Date/Time:  Friday, March 9, 2018 02:33 - CONCLUSION:  Modest 

worsening 





 left base consolidation and with a probable tiny left pleural effusion 





 developing.     Jose Enrique Chiu MD 


 


Pelvis X-Ray 3/8/18 1941 Signed





Impressions: 





 Service Date/Time:  Thursday, March 8, 2018 19:39 - CONCLUSION:  No gross 





 abnormality seen on this limited exam.     Fredy Marquez MD 


 


Maxillofacial CT 3/8/18 1941 Signed





Impressions: 





 Service Date/Time:  Thursday, March 8, 2018 19:45 - CONCLUSION:  1. Bilateral 





 LeFort III facial fractures with significant impaction.. 2. Intracranial 





 displacement of the right superior orbital fracture and comminuted fracture of 





 the anterior paolo carlyn.     Fredy Marquez MD 


 


Head CT 3/8/18 1941 Signed





Impressions: 





 Service Date/Time:  Thursday, March 8, 2018 19:45 - CONCLUSION:  1. 

Intracranial 





 hemorrhage predominately on the right including subarachnoid (high convexity), 





 intraparenchymal (right occipital), subdural (right temporal) and epidural (

high 





 convexity right parietal). 2.    LeFort III facial bone fractures with crush 





 injury of the maxillary and ethmoid regions.     Fredy Marquez MD 


 


Chest X-Ray 3/8/18 1941 Signed





Impressions: 





 Service Date/Time:  Thursday, March 8, 2018 19:39 - CONCLUSION:  1. ET tube in 





 good position. 2. Bilateral rib fractures, nondisplaced and multilevel on the 





 right and with a displaced posterior 3rd rib fragment.     Fredy Marquez MD 


 


Chest CT 3/8/18 1941 Signed





Impressions: 





 Service Date/Time:  Thursday, March 8, 2018 20:17 - CONCLUSION:  1. Hairline 





 fracture the superior endplate of T7 with concentric paraspinal hematoma 





 extending 7 cm superior/inferior. 2. Multiple nondisplaced fractures of the 





 posterolateral 7th and 10th ribs. 3. Comminuted fracture of the body of the 

left 





 scapula. 4. Hiatus hernia containing the gastric fundus. 5. Probable contusion 





 in the posterior segment right upper lobe and posterolateral left lower chest.

   





   Fredy Marquez MD 


 


Cervical Spine CT 3/8/18 1941 Signed





Impressions: 





 Service Date/Time:  Thursday, March 8, 2018 19:45 - CONCLUSION:  No evidence 

of 





 acute fracture or spondylolisthesis.     Fredy Marquez MD 


 


Abdomen/Pelvis CT 3/8/18 1941 Signed





Impressions: 





 Service Date/Time:  Thursday, March 8, 2018 20:17 - CONCLUSION:  1. Acute left 





 rib fractures and left transverse process fractures. 2. Hiatus hernia 

containing 





 the gastric fundus. 3. The solid and hollow organs of the abdomen/pelvis 

appear 





 grossly intact.     Fredy Marquez MD 








Objective Remarks


Pt laying in bed


Mech ventilation


VSS





RLE: External fixator in place with clean dry dressings. Intact distal pulses 

and good capillary refills


RUE: Splint, dressings clean and dry. Mild swelling into hand, +nvi


LUE: dressings clean and dry. Moderate swelling hand, +nvi





Assessment & Plan


Assessment and Plan


1) Right Open Distal Femur Fx and Right Tibial Plateau Fx status post external 

fixation, irrigation debridement and traumatic wound closure POD #10


3) Right Radius/Ulna Shaft Fxs s/p ORIF - POD 6


4) Left Distal 1/3 Humerus Fx





Pt on mechanical ventilation. 


RN states pt is currently NPO because they cannot place an NG tube. 


Continue cardiopulmonary support. 


Maintain splints upper extremity


Maintain external fixation with pin care twice a day. 


will need definitive surgery for left humerus and right femur once patient more 

stable


consents on chart


potential surgery tomorrow if cleared. informed that supposed to have care 

meeting with family on Thursday.  Will discuss with mikal and may wait til 

after that meeting to proceed with surgery pending family's decision on care











Galindo Segura/First Assist PA Mar 19, 2018 07:16

## 2018-03-19 NOTE — HHI.HCPN
Reason for visit





   a.  To assist with evaluation and management of symptoms including: pain, 

dyspnea


   b.  To assist medical decision maker(s) with: better understanding of 

current medical conditions; weighing benefits/burdens of medical treatment 

options; making        


        medical treatment decisions.


.





Subjective/Interval History


INTERVAL NOTE:





The patient remains intubated on mechanical ventilation.  Patient remains on 

fentanyl and propofol is on hold.  Patient opens his left eye to verbal stimuli 

and followed some commands. He appears to attempt to turn his head toward his 

daughter's voice. Follow-up MRI of the brain on 3/16/18 showed multiple areas 

of contusion, shearing, and subarachnoid blood. Dr. Marin writes that the MRI 

by itself is "not disastrous."





Chest x-ray this morning revealed consolidation in the medial left lower lung; 

the right lung was clear.  Respiratory status is gradually improving.





Ongoing low-grade fevers with elevated WBC of 14.1.  





Oral maxillofacial consulted for repair of facial fractures.  Patient still 

needs tracheostomy and PEG tube placement.  Tolerating artificial nutrition via 

NGT with no residuals.  Albumin is 2.1





Patient's wife requesting to meet with the medical team including specialists 

on Thursday 3/22/18 to discuss patient's prognosis and to clarify medical 

treatment goals.


.


Family/friend interactions


Patient's daughter (Olga) was at bedside.  An update was provided on the 

patient's clinical condition.  The patient's wife has requested a meeting with 

the entire medical team on Thursday 3/22/18 after her daughters (an RN and 

nurse practitioner) have arrived in [facility].  The patient's biological children 

are also planning to attend.  During my conversation with Olga today, she 

indicated that they do not believe the marriage license was signed by her 

father and they intend to speak with authorities and/or get an  if 

necessary.


.





Advance Directives


Living Will:  Never completed


Health Care Surrogate:  Never completed


Durable Power of :  Never completed





Objective





Vital Signs








  Date Time  Temp Pulse Resp B/P (MAP) Pulse Ox O2 Delivery O2 Flow Rate FiO2


 


3/19/18 14:00  101      


 


3/19/18 12:00 99.7 113 16 162/82 (108) 97   


 


3/19/18 12:00        40


 


3/19/18 12:00  113      


 


3/19/18 11:10     97   40


 


3/19/18 10:00  117      


 


3/19/18 08:45     96   40


 


3/19/18 08:45        40


 


3/19/18 08:28     100   40


 


3/19/18 08:00        40


 


3/19/18 08:00 99.9 116 25 157/90 (112) 97   


 


3/19/18 08:00  116      


 


3/19/18 07:00     97 Mechanical Ventilator  40


 


3/19/18 06:26  97      


 


3/19/18 05:53 99.9 99 16 142/73 (96) 96   


 


3/19/18 05:53        40


 


3/19/18 05:52  98      


 


3/19/18 05:07     95   40


 


3/19/18 02:25        40


 


3/19/18 02:25 100.0 98 16 136/70 (92) 100   


 


3/19/18 02:24  96      


 


3/18/18 23:58     96   40


 


3/18/18 22:07  91      


 


3/18/18 21:59     95 Mechanical Ventilator  40


 


3/18/18 20:57     96   40


 


3/18/18 20:00        40


 


3/18/18 20:00 100.2 91 16 130/73 (92) 95   


 


3/18/18 18:00  100      














Intake & Output  


 


 3/19/18 3/19/18





 07:00 19:00


 


Intake Total 753 ml 


 


Output Total 1200 ml 


 


Balance -447 ml 


 


  


 


Tube Feeding 553 ml 


 


Other 200 ml 


 


Output Urine Total 1200 ml 


 


Stool Total 0 ml 





.


Physical Exam


CONSTITUTIONAL/GENERAL: This is an adequately nourished, male patient currently 

intubated on mechanical ventilation


TUBES/LINES/DRAINS: CVL, left femoral arterial line, Cuellar catheter, SCDs, ETT, 

NGT


EYES: Unable to examine eyes/pupils secondary to periorbital edema


ENT: Significant bruising and swelling on face.  Multiple lacerations. 


NECK: C-collar in place.


CARDIOVASCULAR: Regular rate and rhythm without murmurs, gallops, or rubs. No 

JVD. Peripheral pulses symmetric.


RESPIRATORY/CHEST: Orotracheally intubated on mechanical ventilation.  Coarse 

air exchange.


GASTROINTESTINAL: Abdomen soft, non-tender, nondistended.  Bowel sounds present.


MUSCULOSKELETAL: Left and right upper extremities with splints in place. Right 

lower extremity long splint in place, ex-fix.  Peripheral pulses are difficult 

to palpate


NEUROLOGICAL: Opens eyes to verbal stimuli.  Follows some commands.  Attempted 

to turn his head to the left toward his daughter's voice.


PSYCHIATRIC: Unable to assess secondary to clinical condition and sedation.


.





Diagnostic Tests


Laboratory





Laboratory Tests








Test


  3/17/18


05:08 3/18/18


03:58 3/19/18


03:00 3/19/18


03:50


 


White Blood Count


  9.9 TH/MM3


(4.0-11.0) 11.0 TH/MM3


(4.0-11.0) 14.1 TH/MM3


(4.0-11.0) 


 


 


Red Blood Count


  3.11 MIL/MM3


(4.50-5.90) 3.19 MIL/MM3


(4.50-5.90) 3.27 MIL/MM3


(4.50-5.90) 


 


 


Hemoglobin


  9.0 GM/DL


(13.0-17.0) 8.9 GM/DL


(13.0-17.0) 9.1 GM/DL


(13.0-17.0) 


 


 


Hematocrit


  26.6 %


(39.0-51.0) 27.1 %


(39.0-51.0) 27.8 %


(39.0-51.0) 


 


 


Mean Corpuscular Volume


  85.4 FL


(80.0-100.0) 84.9 FL


(80.0-100.0) 85.0 FL


(80.0-100.0) 


 


 


Mean Corpuscular Hemoglobin


  28.8 PG


(27.0-34.0) 27.8 PG


(27.0-34.0) 27.9 PG


(27.0-34.0) 


 


 


Mean Corpuscular Hemoglobin


Concent 33.7 %


(32.0-36.0) 32.8 %


(32.0-36.0) 32.9 %


(32.0-36.0) 


 


 


Red Cell Distribution Width


  19.1 %


(11.6-17.2) 20.4 %


(11.6-17.2) 20.0 %


(11.6-17.2) 


 


 


Platelet Count


  233 TH/MM3


(150-450) 238 TH/MM3


(150-450) 243 TH/MM3


(150-450) 


 


 


Mean Platelet Volume


  7.8 FL


(7.0-11.0) 8.0 FL


(7.0-11.0) 8.1 FL


(7.0-11.0) 


 


 


Neutrophils (%) (Auto)


  70.0 %


(16.0-70.0) 71.2 %


(16.0-70.0) 75.0 %


(16.0-70.0) 


 


 


Lymphocytes (%) (Auto)


  18.4 %


(9.0-44.0) 15.6 %


(9.0-44.0) 12.8 %


(9.0-44.0) 


 


 


Monocytes (%) (Auto)


  8.3 %


(0.0-8.0) 8.4 %


(0.0-8.0) 7.7 %


(0.0-8.0) 


 


 


Eosinophils (%) (Auto)


  3.0 %


(0.0-4.0) 4.6 %


(0.0-4.0) 4.1 %


(0.0-4.0) 


 


 


Basophils (%) (Auto)


  0.3 %


(0.0-2.0) 0.2 %


(0.0-2.0) 0.4 %


(0.0-2.0) 


 


 


Neutrophils # (Auto)


  6.9 TH/MM3


(1.8-7.7) 7.8 TH/MM3


(1.8-7.7) 10.6 TH/MM3


(1.8-7.7) 


 


 


Lymphocytes # (Auto)


  1.8 TH/MM3


(1.0-4.8) 1.7 TH/MM3


(1.0-4.8) 1.8 TH/MM3


(1.0-4.8) 


 


 


Monocytes # (Auto)


  0.8 TH/MM3


(0-0.9) 0.9 TH/MM3


(0-0.9) 1.1 TH/MM3


(0-0.9) 


 


 


Eosinophils # (Auto)


  0.3 TH/MM3


(0-0.4) 0.5 TH/MM3


(0-0.4) 0.6 TH/MM3


(0-0.4) 


 


 


Basophils # (Auto)


  0.0 TH/MM3


(0-0.2) 0.0 TH/MM3


(0-0.2) 0.1 TH/MM3


(0-0.2) 


 


 


CBC Comment DIFF FINAL  DIFF FINAL  DIFF FINAL  


 


Differential Comment       


 


Blood Urea Nitrogen


  31 MG/DL


(7-18) 27 MG/DL


(7-18) 28 MG/DL


(7-18) 


 


 


Creatinine


  0.77 MG/DL


(0.60-1.30) 0.81 MG/DL


(0.60-1.30) 0.79 MG/DL


(0.60-1.30) 


 


 


Random Glucose


  151 MG/DL


() 163 MG/DL


() 178 MG/DL


() 


 


 


Total Protein


  5.5 GM/DL


(6.4-8.2) 5.4 GM/DL


(6.4-8.2) 6.0 GM/DL


(6.4-8.2) 


 


 


Albumin


  2.2 GM/DL


(3.4-5.0) 2.1 GM/DL


(3.4-5.0) 2.1 GM/DL


(3.4-5.0) 


 


 


Calcium Level


  8.1 MG/DL


(8.5-10.1) 8.0 MG/DL


(8.5-10.1) 7.9 MG/DL


(8.5-10.1) 


 


 


Alkaline Phosphatase


  75 U/L


() 86 U/L


() 98 U/L


() 


 


 


Aspartate Amino Transf


(AST/SGOT) 93 U/L (15-37) 


  81 U/L (15-37) 


  54 U/L (15-37) 


  


 


 


Alanine Aminotransferase


(ALT/SGPT) 36 U/L (12-78) 


  41 U/L (12-78) 


  38 U/L (12-78) 


  


 


 


Total Bilirubin


  0.7 MG/DL


(0.2-1.0) 0.6 MG/DL


(0.2-1.0) 0.5 MG/DL


(0.2-1.0) 


 


 


Sodium Level


  154 MEQ/L


(136-145) 153 MEQ/L


(136-145) 150 MEQ/L


(136-145) 


 


 


Potassium Level


  3.3 MEQ/L


(3.5-5.1) 3.4 MEQ/L


(3.5-5.1) 3.7 MEQ/L


(3.5-5.1) 


 


 


Chloride Level


  119 MEQ/L


() 119 MEQ/L


() 116 MEQ/L


() 


 


 


Carbon Dioxide Level


  25.7 MEQ/L


(21.0-32.0) 25.5 MEQ/L


(21.0-32.0) 27.7 MEQ/L


(21.0-32.0) 


 


 


Anion Gap 9 MEQ/L (5-15)  9 MEQ/L (5-15)  6 MEQ/L (5-15)  


 


Estimat Glomerular Filtration


Rate 101 ML/MIN


(>89) 95 ML/MIN


(>89) 98 ML/MIN


(>89) 


 


 


B-Type Natriuretic Peptide


  


  18 PG/ML


(0-100) 


  


 


 


Blood Gas Puncture Site    ART LINE 


 


Blood Gas Patient Temperature    98.6 


 


Blood Gas HCO3


  


  


  


  26 mmol/L


(22-26)


 


Blood Gas Base Excess


  


  


  


  2.1 mmol/L


(-2-2)


 


Blood Gas Oxygen Saturation    95 % () 


 


Arterial Blood pH


  


  


  


  7.47


(7.380-7.420)


 


Arterial Blood Partial


Pressure CO2 


  


  


  36 mmHg


(38-42)


 


Arterial Blood Partial


Pressure O2 


  


  


  93 mmHg


()


 


Arterial Blood Oxygen Content


  


  


  


  12.6 Vol %


(12.0-20.0)


 


Arterial Blood


Carboxyhemoglobin 


  


  


  1.5 % (0-4) 


 


 


Arterial Blood Methemoglobin    1.0 % (0-2) 


 


Blood Gas Hemoglobin


  


  


  


  9.3 G/DL


(12.0-16.0)


 


Oxygen Delivery Device    VENTILATOR 


 


Blood Gas Ventilator Setting    PRVC/AC 


 


Blood Gas Inspired Oxygen    40 % 





.


Result Diagram:  


3/19/18 0300                                                                   

             3/19/18 0300





Imaging





Last 72 hours Impressions








Chest X-Ray 3/19/18 0600 Signed





Impressions: 





 Service Date/Time:  2018 02:35 - CONCLUSION:  Medial left 





 lower lung consolidation.  Right lung is clear.     Fredy Marquez MD 





.


Procedures


3/8/2018: Left subclavian Cordis IV central line placed


3/8/2018: Femoral arterial line placement


3/8/2018: Right frontal bur hole with placement of an intracranial pressure 

monitor





.





Assessment and Plan


Disease Oriented Problem List:  


(1) Tibial fracture


(2) Ribs, multiple fractures


(3) Humerus distal fracture


(4) Metabolic acidosis


(5) Intracranial hemorrhage


(6) Pleural effusion


(7) Hemorrhagic shock


(8) Acute respiratory failure


(9) Bilateral orbit fractures


Symptom Scale:  


(1) Pain


(2) Dyspnea


Pertinent Non-Medical Issues


Psychosocial: Patient is from [place] and moved to [place] last year. He has 4 

daughters plus grand-children and great-grandchildren. Patient had many 

different jobs. 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 They  in ; his ex-wife  on 

hospice the following month.  Patient was allegedly remarried to Mildred in 2018. However, the family is challenging if the marriage is legal.


Spiritual: Gnosticist casandra


Legal: Attempting to identify the legal healthcare proxy decision-maker.


Ethical issues impacting care: No known ethical issues impacting care at this 

time.


Important Contacts


Mildred Infante, wife: [phone]


Laura Key, daughter: [phone]


.


Prognosis


Patient remains critically ill with multiple life-threatening  injuries status 

post residential on 3/8/2018. Patient is high risk for ongoing setbacks and 

complications.


.


Code Status:  Full Code


Plan


* FULL CODE


* DECISION-MAKING: The patient lacks capacity for decision-making and it is 

highly likely that he will not regain that capacity.  Mildred Infante, wife, is the 

healthcare proxy decision-maker.


* GOALS: The patient's wife and her sister report that they want to continue 

aggressive care, including additional surgical procedures, but request that we 

have "a Care Meeting" with the various medical providers, the patient's wife 

and her sister, and the patient's wife's 2 daughters (1 an RN and 1 a nurse 

practitioner) on Thursday next week 3/22/18. The patient's wife feels she needs 

to have that meeting before she can confidently make decisions about subsequent 

goals. Those 2 daughters are flying in from [place] on Wednesday night.


* Patient's daughter (Olga) was at bedside.  An update was provided on the 

patient's clinical condition.  The patient's wife has requested a meeting with 

the entire medical team on Thursday 3/22/18 after her daughters (an RN and 

nurse practitioner) have arrived in town.  The patient's biological children 

are also planning to attend.  During my conversation with Olga today, she 

indicated that they do not believe the marriage license was signed by her 

father and they intend to speak with authorities and/or get an  if 

necessary.


* SYMPTOM management:


            = Pain: Multifactorial.  Multiple fractures and TBI status post 

residential.  Patient is currently on fentanyl.


   = Dyspnea: Patient orotracheally intubated on mechanical ventilation; 

worsening pleural effusions.  On scheduled Duonebs


* Palliative care will continue to follow this patient throughout his 

hospitalization to establish chest, assist with symptom management and 

clarification of medical treatment goals.


.





Attestation


To help prompt me to consider important information that might be impacting 

today's encounter and assessment, information from prior notes written by 

myself or my colleagues may have been "brought forward" into today's note.  My 

signature on this note, however, is an attestation that I personally performed 

the exam, history, and/or decision-making noted today, and, unless otherwise 

indicated, the interactions with patient, family, and staff as well as the 

review of records all occurred today.  I also attest that the listed assessment 

and stated plan reflect my best clinical judgment today based on the 

combination of historical information, prior notes, and today's exam/ 

interactions.  When time spent is documented, it refers only to time spent 

today by the signer, or if indicated, combined time spent today by 

collaborating physician/nurse practitioner.


.











Priscilla Nelson Mar 19, 2018 16:42
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
## 2018-03-20 VITALS — OXYGEN SATURATION: 96 %

## 2018-03-20 VITALS
DIASTOLIC BLOOD PRESSURE: 65 MMHG | SYSTOLIC BLOOD PRESSURE: 111 MMHG | TEMPERATURE: 99.4 F | HEART RATE: 98 BPM | RESPIRATION RATE: 16 BRPM | OXYGEN SATURATION: 98 %

## 2018-03-20 VITALS
DIASTOLIC BLOOD PRESSURE: 71 MMHG | TEMPERATURE: 100 F | HEART RATE: 103 BPM | SYSTOLIC BLOOD PRESSURE: 137 MMHG | OXYGEN SATURATION: 96 % | RESPIRATION RATE: 16 BRPM

## 2018-03-20 VITALS
DIASTOLIC BLOOD PRESSURE: 77 MMHG | OXYGEN SATURATION: 100 % | HEART RATE: 97 BPM | TEMPERATURE: 99.5 F | RESPIRATION RATE: 16 BRPM | SYSTOLIC BLOOD PRESSURE: 139 MMHG

## 2018-03-20 VITALS
RESPIRATION RATE: 16 BRPM | OXYGEN SATURATION: 99 % | TEMPERATURE: 99 F | HEART RATE: 104 BPM | SYSTOLIC BLOOD PRESSURE: 123 MMHG | DIASTOLIC BLOOD PRESSURE: 74 MMHG

## 2018-03-20 VITALS
SYSTOLIC BLOOD PRESSURE: 147 MMHG | RESPIRATION RATE: 16 BRPM | OXYGEN SATURATION: 95 % | TEMPERATURE: 99.5 F | DIASTOLIC BLOOD PRESSURE: 80 MMHG | HEART RATE: 99 BPM

## 2018-03-20 VITALS — OXYGEN SATURATION: 97 %

## 2018-03-20 VITALS
DIASTOLIC BLOOD PRESSURE: 67 MMHG | OXYGEN SATURATION: 100 % | HEART RATE: 94 BPM | TEMPERATURE: 99.3 F | RESPIRATION RATE: 16 BRPM | SYSTOLIC BLOOD PRESSURE: 133 MMHG

## 2018-03-20 VITALS — OXYGEN SATURATION: 99 %

## 2018-03-20 VITALS — HEART RATE: 96 BPM

## 2018-03-20 VITALS — HEART RATE: 102 BPM

## 2018-03-20 VITALS — OXYGEN SATURATION: 98 %

## 2018-03-20 VITALS — HEART RATE: 101 BPM

## 2018-03-20 VITALS — HEART RATE: 95 BPM

## 2018-03-20 VITALS — HEART RATE: 100 BPM

## 2018-03-20 VITALS — OXYGEN SATURATION: 92 %

## 2018-03-20 LAB
ALBUMIN SERPL-MCNC: 2.1 GM/DL (ref 3.4–5)
ALP SERPL-CCNC: 117 U/L (ref 45–117)
ALT SERPL-CCNC: 31 U/L (ref 12–78)
AST SERPL-CCNC: 45 U/L (ref 15–37)
BASOPHILS # BLD AUTO: 0 TH/MM3 (ref 0–0.2)
BASOPHILS NFR BLD: 0.3 % (ref 0–2)
BILIRUB SERPL-MCNC: 0.5 MG/DL (ref 0.2–1)
BUN SERPL-MCNC: 26 MG/DL (ref 7–18)
CALCIUM SERPL-MCNC: 8.1 MG/DL (ref 8.5–10.1)
CHLORIDE SERPL-SCNC: 114 MEQ/L (ref 98–107)
CREAT SERPL-MCNC: 0.84 MG/DL (ref 0.6–1.3)
EOSINOPHIL # BLD: 0.5 TH/MM3 (ref 0–0.4)
EOSINOPHIL NFR BLD: 3.7 % (ref 0–4)
ERYTHROCYTE [DISTWIDTH] IN BLOOD BY AUTOMATED COUNT: 20.5 % (ref 11.6–17.2)
GFR SERPLBLD BASED ON 1.73 SQ M-ARVRAT: 91 ML/MIN (ref 89–?)
GLUCOSE SERPL-MCNC: 266 MG/DL (ref 74–106)
HCO3 BLD-SCNC: 25.9 MEQ/L (ref 21–32)
HCT VFR BLD CALC: 28.8 % (ref 39–51)
HGB BLD-MCNC: 9.4 GM/DL (ref 13–17)
LYMPHOCYTES # BLD AUTO: 1.5 TH/MM3 (ref 1–4.8)
LYMPHOCYTES NFR BLD AUTO: 10.5 % (ref 9–44)
MCH RBC QN AUTO: 28 PG (ref 27–34)
MCHC RBC AUTO-ENTMCNC: 32.5 % (ref 32–36)
MCV RBC AUTO: 86.1 FL (ref 80–100)
MONOCYTE #: 1.2 TH/MM3 (ref 0–0.9)
MONOCYTES NFR BLD: 8.2 % (ref 0–8)
NEUTROPHILS # BLD AUTO: 11.1 TH/MM3 (ref 1.8–7.7)
NEUTROPHILS NFR BLD AUTO: 77.3 % (ref 16–70)
PLATELET # BLD: 244 TH/MM3 (ref 150–450)
PMV BLD AUTO: 8.4 FL (ref 7–11)
PROT SERPL-MCNC: 6.1 GM/DL (ref 6.4–8.2)
RBC # BLD AUTO: 3.35 MIL/MM3 (ref 4.5–5.9)
SODIUM SERPL-SCNC: 149 MEQ/L (ref 136–145)
WBC # BLD AUTO: 14.4 TH/MM3 (ref 4–11)

## 2018-03-20 RX ADMIN — PROPOFOL PRN MLS/HR: 10 INJECTION, EMULSION INTRAVENOUS at 05:43

## 2018-03-20 RX ADMIN — OXYCODONE HYDROCHLORIDE SCH MG: 100 SOLUTION ORAL at 16:30

## 2018-03-20 RX ADMIN — MAGNESIUM HYDROXIDE SCH ML: 400 SUSPENSION ORAL at 20:57

## 2018-03-20 RX ADMIN — METOPROLOL TARTRATE SCH MG: 25 TABLET, FILM COATED ORAL at 20:57

## 2018-03-20 RX ADMIN — CHLORHEXIDINE GLUCONATE SCH PACK: 500 CLOTH TOPICAL at 04:00

## 2018-03-20 RX ADMIN — DOCUSATE SODIUM SCH MG: 100 CAPSULE, LIQUID FILLED ORAL at 20:57

## 2018-03-20 RX ADMIN — CHLORHEXIDINE GLUCONATE 0.12% ORAL RINSE SCH ML: 1.2 LIQUID ORAL at 19:42

## 2018-03-20 RX ADMIN — Medication PRN MLS/HR: at 08:36

## 2018-03-20 RX ADMIN — MAGNESIUM HYDROXIDE SCH ML: 400 SUSPENSION ORAL at 08:21

## 2018-03-20 RX ADMIN — OXYCODONE HYDROCHLORIDE SCH MG: 100 SOLUTION ORAL at 23:12

## 2018-03-20 RX ADMIN — OXYCODONE HYDROCHLORIDE SCH MG: 100 SOLUTION ORAL at 18:27

## 2018-03-20 RX ADMIN — PANTOPRAZOLE SODIUM SCH MG: 40 INJECTION, POWDER, FOR SOLUTION INTRAVENOUS at 20:58

## 2018-03-20 RX ADMIN — FUROSEMIDE SCH MG: 10 INJECTION, SOLUTION INTRAMUSCULAR; INTRAVENOUS at 08:20

## 2018-03-20 RX ADMIN — OXYCODONE HYDROCHLORIDE SCH MG: 100 SOLUTION ORAL at 07:00

## 2018-03-20 RX ADMIN — OXYCODONE HYDROCHLORIDE SCH MG: 100 SOLUTION ORAL at 10:38

## 2018-03-20 RX ADMIN — ENOXAPARIN SODIUM SCH MG: 30 INJECTION SUBCUTANEOUS at 23:00

## 2018-03-20 RX ADMIN — BACITRACIN SCH APPLIC: 500 OINTMENT TOPICAL at 08:20

## 2018-03-20 RX ADMIN — LEVETIRACETAM SCH MLS/HR: 100 INJECTION, SOLUTION, CONCENTRATE INTRAVENOUS at 08:20

## 2018-03-20 RX ADMIN — CHLORHEXIDINE GLUCONATE 0.12% ORAL RINSE SCH ML: 1.2 LIQUID ORAL at 08:20

## 2018-03-20 RX ADMIN — PROPOFOL PRN MLS/HR: 10 INJECTION, EMULSION INTRAVENOUS at 18:27

## 2018-03-20 RX ADMIN — BACITRACIN SCH APPLIC: 500 OINTMENT TOPICAL at 20:58

## 2018-03-20 RX ADMIN — PROPOFOL PRN MLS/HR: 10 INJECTION, EMULSION INTRAVENOUS at 02:09

## 2018-03-20 RX ADMIN — ENOXAPARIN SODIUM SCH MG: 30 INJECTION SUBCUTANEOUS at 10:37

## 2018-03-20 RX ADMIN — DOCUSATE SODIUM SCH MG: 100 CAPSULE, LIQUID FILLED ORAL at 08:20

## 2018-03-20 RX ADMIN — METOPROLOL TARTRATE SCH MG: 25 TABLET, FILM COATED ORAL at 08:23

## 2018-03-20 RX ADMIN — PROPOFOL PRN MLS/HR: 10 INJECTION, EMULSION INTRAVENOUS at 10:37

## 2018-03-20 RX ADMIN — OXYCODONE HYDROCHLORIDE SCH MG: 100 SOLUTION ORAL at 02:08
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
## 2018-03-20 NOTE — PD.ORT.PN
Subjective


Subjective Remarks


s/p MCA


right open distal femur s/p exfix


right tibial plateau s/p exfix


right BBFA s/p ORIF - POD 6


left humerus





Objective


Vitals





Vital Signs








  Date Time  Temp Pulse Resp B/P (MAP) Pulse Ox O2 Delivery O2 Flow Rate FiO2


 


3/20/18 06:11  96      


 


3/20/18 05:09        40


 


3/20/18 05:09 99.3 94 16 133/67 (89) 100   


 


3/20/18 05:07  101      


 


3/20/18 03:49     98   40


 


3/20/18 02:10  95      


 


3/20/18 00:14     98   40


 


3/20/18 00:13 99.5 97 16 139/77 (97) 100   


 


3/20/18 00:13        40


 


3/20/18 00:12  96      


 


3/20/18 00:03     98 Mechanical Ventilator  40


 


3/19/18 20:51     100   100


 


3/19/18 19:44     99   40


 


3/19/18 18:00  103      


 


3/19/18 16:29     98   40


 


3/19/18 16:00        40


 


3/19/18 16:00  109      


 


3/19/18 16:00 99.9 106 16 174/91 (118) 99   


 


3/19/18 14:00  101      


 


3/19/18 12:00 99.7 113 16 162/82 (108) 97   


 


3/19/18 12:00        40


 


3/19/18 12:00  113      


 


3/19/18 11:10     97   40


 


3/19/18 10:00  117      


 


3/19/18 08:45     96   40


 


3/19/18 08:45        40


 


3/19/18 08:28     100   40


 


3/19/18 08:00        40


 


3/19/18 08:00 99.9 116 25 157/90 (112) 97   


 


3/19/18 08:00  116      


 


3/19/18 07:00     97 Mechanical Ventilator  40














I/O      


 


 3/19/18 3/19/18 3/19/18 3/20/18 3/20/18 3/20/18





 07:00 15:00 23:00 07:00 15:00 23:00


 


Intake Total 753 ml  769 ml 1047 ml  


 


Output Total 1200 ml  2801 ml 1000 ml  


 


Balance -447 ml  -2032 ml 47 ml  


 


      


 


IV Total    305 ml  


 


Tube Feeding 553 ml  569 ml 542 ml  


 


Other 200 ml  200 ml 200 ml  


 


Output Urine Total 1200 ml  2800 ml 1000 ml  


 


Stool Total 0 ml  1 ml 0 ml  








Result Diagram:  


3/20/18 0340                                                                   

             3/20/18 0340





Imaging





Last 24 hours Impressions








Chest X-Ray 3/9/18 0000 Signed





Impressions: 





 Service Date/Time:  Friday, March 9, 2018 02:33 - CONCLUSION:  Modest 

worsening 





 left base consolidation and with a probable tiny left pleural effusion 





 developing.     Jose Enrique Chiu MD 


 


Pelvis X-Ray 3/8/18 1941 Signed





Impressions: 





 Service Date/Time:  Thursday, March 8, 2018 19:39 - CONCLUSION:  No gross 





 abnormality seen on this limited exam.     Fredy Marquez MD 


 


Maxillofacial CT 3/8/18 1941 Signed





Impressions: 





 Service Date/Time:  Thursday, March 8, 2018 19:45 - CONCLUSION:  1. Bilateral 





 LeFort III facial fractures with significant impaction.. 2. Intracranial 





 displacement of the right superior orbital fracture and comminuted fracture of 





 the anterior paolo carlyn.     Fredy Marquez MD 


 


Head CT 3/8/18 1941 Signed





Impressions: 





 Service Date/Time:  Thursday, March 8, 2018 19:45 - CONCLUSION:  1. 

Intracranial 





 hemorrhage predominately on the right including subarachnoid (high convexity), 





 intraparenchymal (right occipital), subdural (right temporal) and epidural (

high 





 convexity right parietal). 2.    LeFort III facial bone fractures with crush 





 injury of the maxillary and ethmoid regions.     Fredy Marquez MD 


 


Chest X-Ray 3/8/18 1941 Signed





Impressions: 





 Service Date/Time:  Thursday, March 8, 2018 19:39 - CONCLUSION:  1. ET tube in 





 good position. 2. Bilateral rib fractures, nondisplaced and multilevel on the 





 right and with a displaced posterior 3rd rib fragment.     Fredy Marquez MD 


 


Chest CT 3/8/18 1941 Signed





Impressions: 





 Service Date/Time:  Thursday, March 8, 2018 20:17 - CONCLUSION:  1. Hairline 





 fracture the superior endplate of T7 with concentric paraspinal hematoma 





 extending 7 cm superior/inferior. 2. Multiple nondisplaced fractures of the 





 posterolateral 7th and 10th ribs. 3. Comminuted fracture of the body of the 

left 





 scapula. 4. Hiatus hernia containing the gastric fundus. 5. Probable contusion 





 in the posterior segment right upper lobe and posterolateral left lower chest.

   





   Fredy Marquez MD 


 


Cervical Spine CT 3/8/18 1941 Signed





Impressions: 





 Service Date/Time:  Thursday, March 8, 2018 19:45 - CONCLUSION:  No evidence 

of 





 acute fracture or spondylolisthesis.     Fredy Marquez MD 


 


Abdomen/Pelvis CT 3/8/18 1941 Signed





Impressions: 





 Service Date/Time:  Thursday, March 8, 2018 20:17 - CONCLUSION:  1. Acute left 





 rib fractures and left transverse process fractures. 2. Hiatus hernia 

containing 





 the gastric fundus. 3. The solid and hollow organs of the abdomen/pelvis 

appear 





 grossly intact.     Fredy Marquez MD 








Objective Remarks


Pt laying in bed


Mech ventilation


VSS





RLE: External fixator in place with clean dry dressings. Intact distal pulses 

and good capillary refills


RUE: Splint, dressings clean and dry. Mild swelling into hand, +nvi


LUE: dressings clean and dry. Moderate swelling hand, +nvi





Assessment & Plan


Assessment and Plan


1) Right Open Distal Femur Fx and Right Tibial Plateau Fx status post external 

fixation, irrigation debridement and traumatic wound closure POD #10


3) Right Radius/Ulna Shaft Fxs s/p ORIF - POD 6


4) Left Distal 1/3 Humerus Fx





Pt on mechanical ventilation. 


RN states pt is currently NPO because they cannot place an NG tube. 


Continue cardiopulmonary support. 


Maintain splints upper extremity


Maintain external fixation with pin care twice a day. 


will need definitive surgery for left humerus and right femur once patient more 

stable


consents on chart


patient is supposed to have a care plan meeting on thursday with family to 

decide future plan of care.   Will wait for any further surgical intervention 

until after that meeting











Galindo Segura/First Assist PA Mar 20, 2018 06:59

## 2018-03-20 NOTE — HHI.HCPN
Reason for visit





   a.  To assist with evaluation and management of symptoms including: pain, 

dyspnea


   b.  To assist medical decision maker(s) with: better understanding of 

current medical conditions; weighing benefits/burdens of medical treatment 

options; making        


        medical treatment decisions.


.





Subjective/Interval History


INTERVAL NOTE:





The patient remains intubated on mechanical ventilation, sedated on 100mcg of 

fentanyl. Follow-up MRI of the brain on 3/16/18 showed multiple areas of 

contusion, shearing, and subarachnoid blood.  Overall neurological prognosis is 

unclear given sedation and multiple injuries.  





CT facial bones showing bilateral LeFort III fractures with significant 

impaction; intracranial displacement of the right superior orbital fracture and 

comminuted fracture of the anterior paolo carlyn. Patient has splints on 

bilateral upper extremities and external fixation with pin on right lower 

extremity.  He will need surgery for his left humerus and right when he is more 

stable as well as oral maxillofacial surgery for repair of facial fractures.





Ongoing low-grade fevers with elevated WBC of 14.4. Follow-up chest x-ray this 

morning showed persistent left lower lobe consolidation.





Patient will need tracheostomy and PEG tube placement if goals remain 

aggressive.  Tolerating artificial nutrition via NGT with no residuals.  

Albumin is 2.1





Patient's wife requesting to meet with the medical team including specialists 

on Thursday 3/22/18 at 11 AM to discuss patient's prognosis and to clarify 

medical treatment goals.


.


Family/friend interactions


Spoke with patient's wife (Mildred) as well as patient's daughter (Laura) via 

telephone to provide an update on the patient's clinical condition.  Patient's 

wife indicates she remains cautiously optimistic stating she was told by 

neurosurgery this morning that the patient is "getting better."  Patient's 

daughters planned to attend a scheduled meeting with palliative care and the 

patient's wife on 3/22/2018 at 11am either physically or via conference call. 

Patient's daughter continues to state her father would not want ongoing 

aggressive interventions if he cannot return to his baseline functional and 

cognitive level.  She states many years ago her father told her "if he could 

not wait his but any more, take in the backyard and put him down."


.





Advance Directives


Living Will:  Never completed


Health Care Surrogate:  Never completed


Durable Power of :  Never completed





Objective





Vital Signs








  Date Time  Temp Pulse Resp B/P (MAP) Pulse Ox O2 Delivery O2 Flow Rate FiO2


 


3/20/18 13:24     97   40


 


3/20/18 12:00        40


 


3/20/18 11:09     99   40


 


3/20/18 08:00  104      


 


3/20/18 08:00        40


 


3/20/18 08:00 99.0 98 16 123/74 (90) 99   


 


3/20/18 07:44     99   40


 


3/20/18 07:00     100 Mechanical Ventilator  40


 


3/20/18 06:11  96      


 


3/20/18 05:09        40


 


3/20/18 05:09 99.3 94 16 133/67 (89) 100   


 


3/20/18 05:07  101      


 


3/20/18 03:49     98   40


 


3/20/18 02:10  95      


 


3/20/18 00:14     98   40


 


3/20/18 00:13 99.5 97 16 139/77 (97) 100   


 


3/20/18 00:13        40


 


3/20/18 00:12  96      


 


3/20/18 00:03     98 Mechanical Ventilator  40


 


3/19/18 20:51     100   100


 


3/19/18 19:44     99   40


 


3/19/18 18:00  103      


 


3/19/18 16:29     98   40


 


3/19/18 16:00        40


 


3/19/18 16:00  109      


 


3/19/18 16:00 99.9 106 16 174/91 (118) 99   














Intake & Output  


 


 3/20/18 3/20/18





 07:00 19:00


 


Intake Total 1047 ml 455 ml


 


Output Total 1000 ml 


 


Balance 47 ml 455 ml


 


  


 


IV Total 305 ml 455 ml


 


Tube Feeding 542 ml 


 


Other 200 ml 


 


Output Urine Total 1000 ml 


 


Stool Total 0 ml 





.


Physical Exam


CONSTITUTIONAL/GENERAL: This is an adequately nourished, male patient currently 

intubated on mechanical ventilation


TUBES/LINES/DRAINS: CVL, left femoral arterial line, PIV, Cuellar catheter, SCDs, 

ETT, NGT


EYES: Unable to examine eyes/pupils secondary to periorbital edema


ENT: Significant bruising and swelling on face.  Multiple lacerations with 

dried blood around the mouth and nose.


NECK: C-collar in place.


CARDIOVASCULAR: Regular rate and rhythm without murmurs, gallops, or rubs. No 

JVD. Peripheral pulses symmetric.


RESPIRATORY/CHEST: Orotracheally intubated on mechanical ventilation.  Coarse 

air exchange.


GASTROINTESTINAL: Abdomen soft, non-tender, nondistended.  Bowel sounds present.


MUSCULOSKELETAL: Left and right upper extremities with splints in place. Right 

lower extremity long splint in place, ex-fix.  Peripheral pulses are difficult 

to palpate


NEUROLOGICAL: Sedated on fentanyl. Did not arouse to verbal stimulation on exam


PSYCHIATRIC: Unable to assess secondary to clinical condition and sedation.


.





Diagnostic Tests


Laboratory





Laboratory Tests








Test


  3/18/18


03:58 3/19/18


03:00 3/19/18


03:50 3/20/18


02:10


 


White Blood Count


  11.0 TH/MM3


(4.0-11.0) 14.1 TH/MM3


(4.0-11.0) 


  


 


 


Red Blood Count


  3.19 MIL/MM3


(4.50-5.90) 3.27 MIL/MM3


(4.50-5.90) 


  


 


 


Hemoglobin


  8.9 GM/DL


(13.0-17.0) 9.1 GM/DL


(13.0-17.0) 


  


 


 


Hematocrit


  27.1 %


(39.0-51.0) 27.8 %


(39.0-51.0) 


  


 


 


Mean Corpuscular Volume


  84.9 FL


(80.0-100.0) 85.0 FL


(80.0-100.0) 


  


 


 


Mean Corpuscular Hemoglobin


  27.8 PG


(27.0-34.0) 27.9 PG


(27.0-34.0) 


  


 


 


Mean Corpuscular Hemoglobin


Concent 32.8 %


(32.0-36.0) 32.9 %


(32.0-36.0) 


  


 


 


Red Cell Distribution Width


  20.4 %


(11.6-17.2) 20.0 %


(11.6-17.2) 


  


 


 


Platelet Count


  238 TH/MM3


(150-450) 243 TH/MM3


(150-450) 


  


 


 


Mean Platelet Volume


  8.0 FL


(7.0-11.0) 8.1 FL


(7.0-11.0) 


  


 


 


Neutrophils (%) (Auto)


  71.2 %


(16.0-70.0) 75.0 %


(16.0-70.0) 


  


 


 


Lymphocytes (%) (Auto)


  15.6 %


(9.0-44.0) 12.8 %


(9.0-44.0) 


  


 


 


Monocytes (%) (Auto)


  8.4 %


(0.0-8.0) 7.7 %


(0.0-8.0) 


  


 


 


Eosinophils (%) (Auto)


  4.6 %


(0.0-4.0) 4.1 %


(0.0-4.0) 


  


 


 


Basophils (%) (Auto)


  0.2 %


(0.0-2.0) 0.4 %


(0.0-2.0) 


  


 


 


Neutrophils # (Auto)


  7.8 TH/MM3


(1.8-7.7) 10.6 TH/MM3


(1.8-7.7) 


  


 


 


Lymphocytes # (Auto)


  1.7 TH/MM3


(1.0-4.8) 1.8 TH/MM3


(1.0-4.8) 


  


 


 


Monocytes # (Auto)


  0.9 TH/MM3


(0-0.9) 1.1 TH/MM3


(0-0.9) 


  


 


 


Eosinophils # (Auto)


  0.5 TH/MM3


(0-0.4) 0.6 TH/MM3


(0-0.4) 


  


 


 


Basophils # (Auto)


  0.0 TH/MM3


(0-0.2) 0.1 TH/MM3


(0-0.2) 


  


 


 


CBC Comment DIFF FINAL  DIFF FINAL   


 


Differential Comment      


 


Blood Urea Nitrogen


  27 MG/DL


(7-18) 28 MG/DL


(7-18) 


  


 


 


Creatinine


  0.81 MG/DL


(0.60-1.30) 0.79 MG/DL


(0.60-1.30) 


  


 


 


Random Glucose


  163 MG/DL


() 178 MG/DL


() 


  


 


 


Total Protein


  5.4 GM/DL


(6.4-8.2) 6.0 GM/DL


(6.4-8.2) 


  


 


 


Albumin


  2.1 GM/DL


(3.4-5.0) 2.1 GM/DL


(3.4-5.0) 


  


 


 


Calcium Level


  8.0 MG/DL


(8.5-10.1) 7.9 MG/DL


(8.5-10.1) 


  


 


 


Alkaline Phosphatase


  86 U/L


() 98 U/L


() 


  


 


 


Aspartate Amino Transf


(AST/SGOT) 81 U/L (15-37) 


  54 U/L (15-37) 


  


  


 


 


Alanine Aminotransferase


(ALT/SGPT) 41 U/L (12-78) 


  38 U/L (12-78) 


  


  


 


 


Total Bilirubin


  0.6 MG/DL


(0.2-1.0) 0.5 MG/DL


(0.2-1.0) 


  


 


 


Sodium Level


  153 MEQ/L


(136-145) 150 MEQ/L


(136-145) 


  


 


 


Potassium Level


  3.4 MEQ/L


(3.5-5.1) 3.7 MEQ/L


(3.5-5.1) 


  


 


 


Chloride Level


  119 MEQ/L


() 116 MEQ/L


() 


  


 


 


Carbon Dioxide Level


  25.5 MEQ/L


(21.0-32.0) 27.7 MEQ/L


(21.0-32.0) 


  


 


 


Anion Gap 9 MEQ/L (5-15)  6 MEQ/L (5-15)   


 


Estimat Glomerular Filtration


Rate 95 ML/MIN


(>89) 98 ML/MIN


(>89) 


  


 


 


B-Type Natriuretic Peptide


  18 PG/ML


(0-100) 


  


  


 


 


Blood Gas Puncture Site   ART LINE  ART LINE 


 


Blood Gas Patient Temperature   98.6  98.6 


 


Blood Gas HCO3


  


  


  26 mmol/L


(22-26) 24 mmol/L


(22-26)


 


Blood Gas Base Excess


  


  


  2.1 mmol/L


(-2-2) 1.0 mmol/L


(-2-2)


 


Blood Gas Oxygen Saturation   95 % ()  96 % () 


 


Arterial Blood pH


  


  


  7.47


(7.380-7.420) 7.50


(7.380-7.420)


 


Arterial Blood Partial


Pressure CO2 


  


  36 mmHg


(38-42) 31 mmHg


(38-42)


 


Arterial Blood Partial


Pressure O2 


  


  93 mmHg


() 112 mmHg


()


 


Arterial Blood Oxygen Content


  


  


  12.6 Vol %


(12.0-20.0) 13.7 Vol %


(12.0-20.0)


 


Arterial Blood


Carboxyhemoglobin 


  


  1.5 % (0-4) 


  1.6 % (0-4) 


 


 


Arterial Blood Methemoglobin   1.0 % (0-2)  1.0 % (0-2) 


 


Blood Gas Hemoglobin


  


  


  9.3 G/DL


(12.0-16.0) 10.0 G/DL


(12.0-16.0)


 


Oxygen Delivery Device   VENTILATOR  VENTILATOR 


 


Blood Gas Ventilator Setting   PRVC/AC  PRVC/AC 


 


Blood Gas Inspired Oxygen   40 %  40 % 


 


Test


  3/20/18


03:40 


  


  


 


 


White Blood Count


  14.4 TH/MM3


(4.0-11.0) 


  


  


 


 


Red Blood Count


  3.35 MIL/MM3


(4.50-5.90) 


  


  


 


 


Hemoglobin


  9.4 GM/DL


(13.0-17.0) 


  


  


 


 


Hematocrit


  28.8 %


(39.0-51.0) 


  


  


 


 


Mean Corpuscular Volume


  86.1 FL


(80.0-100.0) 


  


  


 


 


Mean Corpuscular Hemoglobin


  28.0 PG


(27.0-34.0) 


  


  


 


 


Mean Corpuscular Hemoglobin


Concent 32.5 %


(32.0-36.0) 


  


  


 


 


Red Cell Distribution Width


  20.5 %


(11.6-17.2) 


  


  


 


 


Platelet Count


  244 TH/MM3


(150-450) 


  


  


 


 


Mean Platelet Volume


  8.4 FL


(7.0-11.0) 


  


  


 


 


Neutrophils (%) (Auto)


  77.3 %


(16.0-70.0) 


  


  


 


 


Lymphocytes (%) (Auto)


  10.5 %


(9.0-44.0) 


  


  


 


 


Monocytes (%) (Auto)


  8.2 %


(0.0-8.0) 


  


  


 


 


Eosinophils (%) (Auto)


  3.7 %


(0.0-4.0) 


  


  


 


 


Basophils (%) (Auto)


  0.3 %


(0.0-2.0) 


  


  


 


 


Neutrophils # (Auto)


  11.1 TH/MM3


(1.8-7.7) 


  


  


 


 


Lymphocytes # (Auto)


  1.5 TH/MM3


(1.0-4.8) 


  


  


 


 


Monocytes # (Auto)


  1.2 TH/MM3


(0-0.9) 


  


  


 


 


Eosinophils # (Auto)


  0.5 TH/MM3


(0-0.4) 


  


  


 


 


Basophils # (Auto)


  0.0 TH/MM3


(0-0.2) 


  


  


 


 


CBC Comment DIFF FINAL    


 


Differential Comment     


 


Blood Urea Nitrogen


  26 MG/DL


(7-18) 


  


  


 


 


Creatinine


  0.84 MG/DL


(0.60-1.30) 


  


  


 


 


Random Glucose


  266 MG/DL


() 


  


  


 


 


Total Protein


  6.1 GM/DL


(6.4-8.2) 


  


  


 


 


Albumin


  2.1 GM/DL


(3.4-5.0) 


  


  


 


 


Calcium Level


  8.1 MG/DL


(8.5-10.1) 


  


  


 


 


Alkaline Phosphatase


  117 U/L


() 


  


  


 


 


Aspartate Amino Transf


(AST/SGOT) 45 U/L (15-37) 


  


  


  


 


 


Alanine Aminotransferase


(ALT/SGPT) 31 U/L (12-78) 


  


  


  


 


 


Total Bilirubin


  0.5 MG/DL


(0.2-1.0) 


  


  


 


 


Sodium Level


  149 MEQ/L


(136-145) 


  


  


 


 


Potassium Level


  3.8 MEQ/L


(3.5-5.1) 


  


  


 


 


Chloride Level


  114 MEQ/L


() 


  


  


 


 


Carbon Dioxide Level


  25.9 MEQ/L


(21.0-32.0) 


  


  


 


 


Anion Gap 9 MEQ/L (5-15)    


 


Estimat Glomerular Filtration


Rate 91 ML/MIN


(>89) 


  


  


 





.


Result Diagram:  


3/20/18 0340                                                                   

             3/20/18 0340





Imaging





Last 72 hours Impressions








Chest X-Ray 3/20/18 0600 Signed





Impressions: 





 Service Date/Time:  2018 04:33 - CONCLUSION:  Persistent 

left 





 lower lobe consolidation.       Fredy Marquez MD 


 


Maxillofacial CT 3/19/18 1700 Signed





Impressions: 





 Service Date/Time:  2018 21:02 - CONCLUSION:  1. Numerous 





 facial fractures as above similar in appearance to . Fluid in the 





 paranasal sinuses and mastoid air cells. Patient intubated.     Jeremie Cummings MD 


 


Chest X-Ray 3/19/18 0600 Signed





Impressions: 





 Service Date/Time:  2018 02:35 - CONCLUSION:  Medial left 





 lower lung consolidation.  Right lung is clear.     Fredy Marquez MD 


 


Multiplanar Reconstruction 3/19/18 0000 Signed





Impressions: 





 Service Date/Time:  2018 21:02 - CONCLUSION:  3-D 





 reconstruction of facial bone fractures. See facial bone CT report     Jeremie Cummings MD 





.


Procedures


3/8/2018: Left subclavian Cordis IV central line placed


3/8/2018: Femoral arterial line placement


3/8/2018: Right frontal bur hole with placement of an intracranial pressure 

monitor





.





Assessment and Plan


Disease Oriented Problem List:  


(1) Tibial fracture


(2) Ribs, multiple fractures


(3) Humerus distal fracture


(4) Metabolic acidosis


(5) Intracranial hemorrhage


(6) Pleural effusion


(7) Hemorrhagic shock


(8) Acute respiratory failure


(9) Bilateral orbit fractures


Symptom Scale:  


(1) Pain


(2) Dyspnea


Pertinent Non-Medical Issues


Psychosocial: Patient is from [place] and moved to [place] last year. He has 4 

daughters plus grand-children and great-grandchildren. Patient had many 

different jobs. 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 They  in ; his ex-wife  on 

hospice the following month.  Patient was allegedly remarried to Mildred in 2018; his children and family are having a difficult time accepting this 

because they were unaware of the marriage.


Spiritual: Sikh casandra


Legal: Per [place] statutes, in the absence of written advanced directives 

healthcare proxy decision making falls to the patient's wife.


Ethical issues impacting care: No known ethical issues impacting care at this 

time.


.


Important Contacts


Mildred Infante, wife: [phone]


Laura Key, daughter: [phone]


.


Prognosis


Patient remains critically ill with multiple life-threatening  injuries status 

post custodial on 3/8/2018. Patient is high risk for ongoing setbacks and 

complications.


.


Code Status:  Full Code


Plan


* FULL CODE





* DECISION-MAKING: The patient lacks capacity for decision-making and it is 

highly likely that he will not regain that capacity.  Mildred Infante, wife, is the 

healthcare proxy decision-maker.





* GOALS pending further conversation with patient's family





* The patient's wife reports she is leaning toward ongoing aggressive care, 

including surgical procedures, but states she would like to meet with the 

medical team before she can confidently make decisions about subsequent goals.  





* Spoke with patient's wife (Mildred) as well as patient's daughter (Laura) 

via telephone to provide an update on the patient's clinical condition.  Patient

's wife indicates she remains cautiously optimistic stating she was told by 

neurosurgery this morning that the patient is "getting better."  Patient's 

daughters planned to attend a scheduled meeting with palliative care and the 

patient's wife on 3/22/2018 at 11am either physically or via conference call. 

Patient's daughter continues to state her father would not want ongoing 

aggressive interventions if he cannot return to his baseline functional and 

cognitive level.  She states many years ago her father told her "if he could 

not wait his but any more, take in the backyard and put him down."





* SYMPTOM management:


            = Pain: Multifactorial.  Multiple fractures and TBI status post 

custodial.  Patient is currently on fentanyl.


   = Dyspnea: Patient orotracheally intubated on mechanical ventilation; Follow-

up chest x-ray this morning showed persistent left lower lobe consolidation.  

On scheduled Duonebs





* Discussed patient with Dr. Marin (neurology) and Galindo Segura (orthopedic PA

) as well as the bedside nurse (Nae).





* Palliative care will continue to follow this patient throughout his 

hospitalization to establish chest, assist with symptom management and 

clarification of medical treatment goals.


.





Attestation


To help prompt me to consider important information that might be impacting 

today's encounter and assessment, information from prior notes written by 

myself or my colleagues may have been "brought forward" into today's note.  My 

signature on this note, however, is an attestation that I personally performed 

the exam, history, and/or decision-making noted today, and, unless otherwise 

indicated, the interactions with patient, family, and staff as well as the 

review of records all occurred today.  I also attest that the listed assessment 

and stated plan reflect my best clinical judgment today based on the 

combination of historical information, prior notes, and today's exam/ 

interactions.  When time spent is documented, it refers only to time spent 

today by the signer, or if indicated, combined time spent today by 

collaborating physician/nurse practitioner.


.











Priscilla Nelson Mar 20, 2018 14:58

## 2018-03-20 NOTE — HHI.PR
Review/Management


Daily Summary


3/15


examined pt along with RN


lengthy discussed with his wife and her sister in the [place]


minimal gag to suction


mild withdrawal to strong somatosensory stim


pupils mildly reactive, os more obvious than od


corneal decreased right more than left


msr absent


plantar stim responses?


reviewed ct brain studies, ideally an MRI would be best but I am not sure it 

could be accomplished now





the neuro prognosis is suggestive of a moderate to severe neurologic impairment 

in the best scenario and 


considering a maximum level of care, the wife seems sure he would not want to 

live with such neurologic impairment 


but she also seems uncertain about making a decision to move him to comfort care

, will reassess if needed, monitor





3/16


seen early this am, little more responsive to strong stim


just saw MRI brain, multiple bilateral contusions, left frontal subdural (let 

NS review)


the mri itself is not appearing disastrous


therefore if he survives through extensive care he might achieve some fair 

neurologic function


if needed dr Odonnell covering weekend


did not talk to the wife this morning as she was not in the hospital during 

rounds 





3/20


chart reviewed


spoke with dr Gibson


minimally following commands yesterday when off sedation


neurologic prognosis remains clouded by multiple factors including sedation and 

multiple comorbidities


monitor





Subjective


Subjective Comments


intubated and sedated


Active Medications





Current Medications








 Medications


  (Trade)  Dose


 Ordered  Sig/Kirby


 Route  Start Time


 Stop Time Status Last Admin


 


  (NS Flush)  2 ml  UNSCH  PRN


 IV FLUSH  3/8/18 21:00


     


 


 


  (Vasotec Inj)  1.25 mg  Q8H  PRN


 IV PUSH  3/8/18 21:00


    3/14/18 22:07


 


 


  (Zofran Inj)  4 mg  Q6H  PRN


 IV PUSH  3/8/18 21:00


     


 


 


  (Protonix Inj)  40 mg  Q24H


 IVP  3/8/18 21:00


    3/19/18 23:38


 


 


  (Baciguent Oint)  1 applic  BID


 TOP  3/8/18 21:00


    3/20/18 08:20


 


 


  (Colace)  100 mg  BID


 PO  3/8/18 21:00


    3/19/18 23:38


 


 


 Miscellaneous


 Information  1  Q361D


 XX  3/8/18 21:00


    3/8/18 21:00


 


 


  (Chlorhexidine


 2% Cloth)  


 Taper  DAILY@04


 TOP  3/9/18 04:00


 3/5/19 03:59   


 


 


  (Chlorhexidine


 2% Cloth)  3 pack  UNSCH  PRN


 TOP  3/8/18 21:00


     


 


 


 Potassium Chloride  100 ml @ 


 50 mls/hr  Q2H  PRN


 IV  3/8/18 21:45


    3/15/18 05:38


 


 


 Potassium Chloride  100 ml @ 


 50 mls/hr  Q2H  PRN


 IV  3/8/18 21:45


     


 


 


  (K-Lyte Cl  Eff)  50 meq  UNSCH  PRN


 PO  3/8/18 21:45


     


 


 


 Potassium Chloride  100 ml @ 


 25 mls/hr  UNSCH  PRN


 IV  3/8/18 21:45


    3/18/18 06:31


 


 


 Potassium Chloride  100 ml @ 


 50 mls/hr  Q2H  PRN


 IV  3/8/18 21:45


    3/17/18 06:29


 


 


 Magnesium Sulfate


 4 gm/Sodium


 Chloride  100 ml @ 


 50 mls/hr  UNSCH  PRN


 IV  3/8/18 21:45


     


 


 


  (Mag-Ox)  800 mg  UNSCH  PRN


 PO  3/8/18 21:45


     


 


 


 Magnesium Sulfate


 2 gm/Sodium


 Chloride  100 ml @ 


 50 mls/hr  UNSCH  PRN


 IV  3/8/18 21:45


    3/10/18 12:19


 


 


  (K-Phos)  2,000 mg  Q4H  PRN


 PO  3/8/18 21:45


     


 


 


 Sodium Phosphate


 30 mmol/Sodium


 Chloride  250 ml @ 


 42 mls/hr  UNSCH  PRN


 IV  3/8/18 21:45


     


 


 


  (K-Phos)  2,000 mg  UNSCH  PRN


 PO/TUBE  3/8/18 21:45


     


 


 


 Potassium


 Phosphate 30 mmol/


 Sodium Chloride  260 ml @ 


 42 mls/hr  UNSCH  PRN


 IV  3/8/18 21:45


    3/9/18 20:46


 


 


  (Peridex 0.12%


 Liq)  15 ml  BID@08,20


 MT  3/9/18 08:00


    3/20/18 08:20


 


 


 Fentanyl Citrate  250 ml @ 5


 mls/hr  TITRATE  PRN


 IV  3/8/18 22:00


    3/20/18 08:36


 


 


 Propofol  100 ml @ 


 3.135 mls/


 hr  TITRATE  PRN


 IV  3/8/18 22:00


    3/20/18 05:43


 


 


  (Brethine Inj)  1 mg  UNSCH  PRN


 SQ  3/8/18 23:15


     


 


 


 Levetriacetam 500


 mg/Sodium Chloride  105 ml @ 


 420 mls/hr  Q12HR


 IV  3/9/18 09:00


    3/20/18 08:20


 


 


  (Milk Of


 Magnesia Liq)  30 ml  BID


 PO  3/9/18 09:00


    3/20/18 08:21


 


 


  (Lasix Inj)  40 mg  DAILY


 IV PUSH  3/11/18 10:30


    3/20/18 08:20


 


 


  (Apresoline Inj)  20 mg  Q6H  PRN


 IV PUSH  3/14/18 12:45


    3/16/18 04:27


 


 


  (Tears Naturale


 Opth Soln)  1 drop  Q4H  PRN


 EACH EYE  3/16/18 09:45


    3/17/18 06:17


 


 


  (Roxicodone


 Intensol Liq)  5 mg  Q4H


 PO  3/17/18 11:00


    3/20/18 02:08


 


 


  (Lopressor)  25 mg  Q12HR


 PO  3/17/18 11:15


    3/20/18 08:23


 








Allergies





Allergies


Coded Allergies


  No Allergy Information Available (Unverified3/8/18)





Exam


I&O / VS











 3/20/18 3/20/18 3/21/18





 15:00 23:00 07:00


 


Intake Total 250 ml  


 


Balance 250 ml  


 


   


 


IV Total 250 ml  








Vital Signs








  Date Time  Temp Pulse Resp B/P (MAP) Pulse Ox O2 Delivery O2 Flow Rate FiO2


 


3/20/18 08:00        40


 


3/20/18 07:44     99   40


 


3/20/18 07:00     100 Mechanical Ventilator  40


 


3/20/18 06:11  96      


 


3/20/18 05:09        40


 


3/20/18 05:09 99.3 94 16 133/67 (89) 100   


 


3/20/18 05:07  101      


 


3/20/18 03:49     98   40


 


3/20/18 02:10  95      


 


3/20/18 00:14     98   40


 


3/20/18 00:13 99.5 97 16 139/77 (97) 100   


 


3/20/18 00:13        40


 


3/20/18 00:12  96      


 


3/20/18 00:03     98 Mechanical Ventilator  40


 


3/19/18 20:51     100   100


 


3/19/18 19:44     99   40


 


3/19/18 18:00  103      


 


3/19/18 16:29     98   40


 


3/19/18 16:00        40


 


3/19/18 16:00  109      


 


3/19/18 16:00 99.9 106 16 174/91 (118) 99   


 


3/19/18 14:00  101      


 


3/19/18 12:00 99.7 113 16 162/82 (108) 97   


 


3/19/18 12:00        40


 


3/19/18 12:00  113      


 


3/19/18 11:10     97   40


 


3/19/18 10:00  117      











Objective


Micro and Labs





Laboratory Tests








Test


  3/20/18


02:10 3/20/18


03:40


 


Blood Gas Puncture Site ART LINE  


 


Blood Gas Patient Temperature 98.6  


 


Blood Gas HCO3 24  


 


Blood Gas Base Excess 1.0  


 


Blood Gas Oxygen Saturation 96  


 


Arterial Blood pH 7.50  


 


Arterial Blood Partial


Pressure CO2 31 


  


 


 


Arterial Blood Partial


Pressure O2 112 


  


 


 


Arterial Blood Oxygen Content 13.7  


 


Arterial Blood


Carboxyhemoglobin 1.6 


  


 


 


Arterial Blood Methemoglobin 1.0  


 


Blood Gas Hemoglobin 10.0  


 


Oxygen Delivery Device VENTILATOR  


 


Blood Gas Ventilator Setting PRVC/AC  


 


Blood Gas Inspired Oxygen 40  


 


White Blood Count  14.4 


 


Red Blood Count  3.35 


 


Hemoglobin  9.4 


 


Hematocrit  28.8 


 


Mean Corpuscular Volume  86.1 


 


Mean Corpuscular Hemoglobin  28.0 


 


Mean Corpuscular Hemoglobin


Concent 


  32.5 


 


 


Red Cell Distribution Width  20.5 


 


Platelet Count  244 


 


Mean Platelet Volume  8.4 


 


Neutrophils (%) (Auto)  77.3 


 


Lymphocytes (%) (Auto)  10.5 


 


Monocytes (%) (Auto)  8.2 


 


Eosinophils (%) (Auto)  3.7 


 


Basophils (%) (Auto)  0.3 


 


Neutrophils # (Auto)  11.1 


 


Lymphocytes # (Auto)  1.5 


 


Monocytes # (Auto)  1.2 


 


Eosinophils # (Auto)  0.5 


 


Basophils # (Auto)  0.0 


 


CBC Comment  DIFF FINAL 


 


Differential Comment   


 


Blood Urea Nitrogen  26 


 


Creatinine  0.84 


 


Random Glucose  266 


 


Total Protein  6.1 


 


Albumin  2.1 


 


Calcium Level  8.1 


 


Alkaline Phosphatase  117 


 


Aspartate Amino Transf


(AST/SGOT) 


  45 


 


 


Alanine Aminotransferase


(ALT/SGPT) 


  31 


 


 


Total Bilirubin  0.5 


 


Sodium Level  149 


 


Potassium Level  3.8 


 


Chloride Level  114 


 


Carbon Dioxide Level  25.9 


 


Anion Gap  9 


 


Estimat Glomerular Filtration


Rate 


  91 


 

















Justus Marin MD Mar 20, 2018 09:05
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
## 2018-03-21 VITALS
HEART RATE: 106 BPM | OXYGEN SATURATION: 95 % | DIASTOLIC BLOOD PRESSURE: 73 MMHG | TEMPERATURE: 100.2 F | SYSTOLIC BLOOD PRESSURE: 157 MMHG | RESPIRATION RATE: 16 BRPM

## 2018-03-21 VITALS
RESPIRATION RATE: 16 BRPM | OXYGEN SATURATION: 96 % | DIASTOLIC BLOOD PRESSURE: 82 MMHG | HEART RATE: 99 BPM | SYSTOLIC BLOOD PRESSURE: 140 MMHG | TEMPERATURE: 99.5 F

## 2018-03-21 VITALS
DIASTOLIC BLOOD PRESSURE: 73 MMHG | HEART RATE: 94 BPM | OXYGEN SATURATION: 95 % | SYSTOLIC BLOOD PRESSURE: 138 MMHG | RESPIRATION RATE: 16 BRPM | TEMPERATURE: 99.7 F

## 2018-03-21 VITALS — HEART RATE: 98 BPM

## 2018-03-21 VITALS — OXYGEN SATURATION: 95 %

## 2018-03-21 VITALS
HEART RATE: 103 BPM | SYSTOLIC BLOOD PRESSURE: 143 MMHG | DIASTOLIC BLOOD PRESSURE: 79 MMHG | TEMPERATURE: 99.7 F | RESPIRATION RATE: 16 BRPM | OXYGEN SATURATION: 95 %

## 2018-03-21 VITALS
OXYGEN SATURATION: 95 % | RESPIRATION RATE: 16 BRPM | SYSTOLIC BLOOD PRESSURE: 132 MMHG | DIASTOLIC BLOOD PRESSURE: 75 MMHG | TEMPERATURE: 99.5 F | HEART RATE: 93 BPM

## 2018-03-21 VITALS
DIASTOLIC BLOOD PRESSURE: 79 MMHG | SYSTOLIC BLOOD PRESSURE: 106 MMHG | TEMPERATURE: 100.6 F | HEART RATE: 101 BPM | OXYGEN SATURATION: 95 % | RESPIRATION RATE: 16 BRPM

## 2018-03-21 VITALS — HEART RATE: 94 BPM

## 2018-03-21 VITALS — HEART RATE: 96 BPM

## 2018-03-21 VITALS — OXYGEN SATURATION: 96 %

## 2018-03-21 VITALS — HEART RATE: 114 BPM

## 2018-03-21 VITALS — HEART RATE: 100 BPM

## 2018-03-21 VITALS — HEART RATE: 101 BPM

## 2018-03-21 LAB
ALBUMIN SERPL-MCNC: 2.1 GM/DL (ref 3.4–5)
ALP SERPL-CCNC: 138 U/L (ref 45–117)
ALT SERPL-CCNC: 38 U/L (ref 12–78)
AST SERPL-CCNC: 46 U/L (ref 15–37)
BASOPHILS # BLD AUTO: 0.1 TH/MM3 (ref 0–0.2)
BASOPHILS NFR BLD: 0.5 % (ref 0–2)
BILIRUB SERPL-MCNC: 0.6 MG/DL (ref 0.2–1)
BUN SERPL-MCNC: 27 MG/DL (ref 7–18)
CALCIUM SERPL-MCNC: 7.7 MG/DL (ref 8.5–10.1)
CHLORIDE SERPL-SCNC: 113 MEQ/L (ref 98–107)
CREAT SERPL-MCNC: 0.81 MG/DL (ref 0.6–1.3)
EOSINOPHIL # BLD: 0.6 TH/MM3 (ref 0–0.4)
EOSINOPHIL NFR BLD: 3.9 % (ref 0–4)
ERYTHROCYTE [DISTWIDTH] IN BLOOD BY AUTOMATED COUNT: 20.1 % (ref 11.6–17.2)
GFR SERPLBLD BASED ON 1.73 SQ M-ARVRAT: 95 ML/MIN (ref 89–?)
GLUCOSE SERPL-MCNC: 219 MG/DL (ref 74–106)
HCO3 BLD-SCNC: 25.1 MEQ/L (ref 21–32)
HCT VFR BLD CALC: 28.7 % (ref 39–51)
HGB BLD-MCNC: 9.6 GM/DL (ref 13–17)
LYMPHOCYTES # BLD AUTO: 1.4 TH/MM3 (ref 1–4.8)
LYMPHOCYTES NFR BLD AUTO: 10 % (ref 9–44)
MCH RBC QN AUTO: 28.6 PG (ref 27–34)
MCHC RBC AUTO-ENTMCNC: 33.3 % (ref 32–36)
MCV RBC AUTO: 86 FL (ref 80–100)
MONOCYTE #: 1.2 TH/MM3 (ref 0–0.9)
MONOCYTES NFR BLD: 8.2 % (ref 0–8)
NEUTROPHILS # BLD AUTO: 10.9 TH/MM3 (ref 1.8–7.7)
NEUTROPHILS NFR BLD AUTO: 77.4 % (ref 16–70)
PLATELET # BLD: 307 TH/MM3 (ref 150–450)
PMV BLD AUTO: 8.5 FL (ref 7–11)
PROT SERPL-MCNC: 6.4 GM/DL (ref 6.4–8.2)
RBC # BLD AUTO: 3.34 MIL/MM3 (ref 4.5–5.9)
SODIUM SERPL-SCNC: 146 MEQ/L (ref 136–145)
WBC # BLD AUTO: 14.1 TH/MM3 (ref 4–11)

## 2018-03-21 RX ADMIN — PROPOFOL PRN MLS/HR: 10 INJECTION, EMULSION INTRAVENOUS at 01:21

## 2018-03-21 RX ADMIN — MAGNESIUM HYDROXIDE SCH ML: 400 SUSPENSION ORAL at 09:00

## 2018-03-21 RX ADMIN — IPRATROPIUM BROMIDE AND ALBUTEROL SULFATE SCH AMPULE: .5; 3 SOLUTION RESPIRATORY (INHALATION) at 09:46

## 2018-03-21 RX ADMIN — IPRATROPIUM BROMIDE AND ALBUTEROL SULFATE SCH AMPULE: .5; 3 SOLUTION RESPIRATORY (INHALATION) at 21:29

## 2018-03-21 RX ADMIN — OXYCODONE HYDROCHLORIDE SCH MG: 100 SOLUTION ORAL at 18:58

## 2018-03-21 RX ADMIN — BACITRACIN SCH APPLIC: 500 OINTMENT TOPICAL at 21:00

## 2018-03-21 RX ADMIN — CHLORHEXIDINE GLUCONATE 0.12% ORAL RINSE SCH ML: 1.2 LIQUID ORAL at 08:48

## 2018-03-21 RX ADMIN — BACITRACIN SCH APPLIC: 500 OINTMENT TOPICAL at 09:21

## 2018-03-21 RX ADMIN — MAGNESIUM HYDROXIDE SCH ML: 400 SUSPENSION ORAL at 22:38

## 2018-03-21 RX ADMIN — OXYCODONE HYDROCHLORIDE SCH MG: 100 SOLUTION ORAL at 15:00

## 2018-03-21 RX ADMIN — PROPOFOL PRN MLS/HR: 10 INJECTION, EMULSION INTRAVENOUS at 15:53

## 2018-03-21 RX ADMIN — OXYCODONE HYDROCHLORIDE SCH MG: 100 SOLUTION ORAL at 11:52

## 2018-03-21 RX ADMIN — PROPOFOL PRN MLS/HR: 10 INJECTION, EMULSION INTRAVENOUS at 09:20

## 2018-03-21 RX ADMIN — CHLORHEXIDINE GLUCONATE SCH PACK: 500 CLOTH TOPICAL at 04:00

## 2018-03-21 RX ADMIN — FUROSEMIDE SCH MG: 10 INJECTION, SOLUTION INTRAMUSCULAR; INTRAVENOUS at 09:21

## 2018-03-21 RX ADMIN — PANTOPRAZOLE SODIUM SCH MG: 40 INJECTION, POWDER, FOR SOLUTION INTRAVENOUS at 22:37

## 2018-03-21 RX ADMIN — DOCUSATE SODIUM SCH MG: 100 CAPSULE, LIQUID FILLED ORAL at 09:20

## 2018-03-21 RX ADMIN — IPRATROPIUM BROMIDE AND ALBUTEROL SULFATE SCH AMPULE: .5; 3 SOLUTION RESPIRATORY (INHALATION) at 14:33

## 2018-03-21 RX ADMIN — METOPROLOL TARTRATE SCH MG: 25 TABLET, FILM COATED ORAL at 22:38

## 2018-03-21 RX ADMIN — METOPROLOL TARTRATE SCH MG: 25 TABLET, FILM COATED ORAL at 09:21

## 2018-03-21 RX ADMIN — IPRATROPIUM BROMIDE AND ALBUTEROL SULFATE SCH AMPULE: .5; 3 SOLUTION RESPIRATORY (INHALATION) at 14:29

## 2018-03-21 RX ADMIN — OXYCODONE HYDROCHLORIDE SCH MG: 100 SOLUTION ORAL at 03:00

## 2018-03-21 RX ADMIN — DOCUSATE SODIUM SCH MG: 100 CAPSULE, LIQUID FILLED ORAL at 22:37

## 2018-03-21 RX ADMIN — CHLORHEXIDINE GLUCONATE 0.12% ORAL RINSE SCH ML: 1.2 LIQUID ORAL at 20:00

## 2018-03-21 RX ADMIN — Medication PRN MLS/HR: at 01:21

## 2018-03-21 RX ADMIN — OXYCODONE HYDROCHLORIDE SCH MG: 100 SOLUTION ORAL at 06:36

## 2018-03-21 RX ADMIN — ENOXAPARIN SODIUM SCH MG: 30 INJECTION SUBCUTANEOUS at 11:00
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
## 2018-03-22 VITALS — OXYGEN SATURATION: 96 %

## 2018-03-22 VITALS
RESPIRATION RATE: 16 BRPM | DIASTOLIC BLOOD PRESSURE: 70 MMHG | SYSTOLIC BLOOD PRESSURE: 118 MMHG | OXYGEN SATURATION: 96 % | HEART RATE: 93 BPM | TEMPERATURE: 99.7 F

## 2018-03-22 VITALS — OXYGEN SATURATION: 98 %

## 2018-03-22 VITALS
TEMPERATURE: 99.9 F | SYSTOLIC BLOOD PRESSURE: 123 MMHG | HEART RATE: 93 BPM | OXYGEN SATURATION: 96 % | RESPIRATION RATE: 16 BRPM | DIASTOLIC BLOOD PRESSURE: 70 MMHG

## 2018-03-22 VITALS
RESPIRATION RATE: 16 BRPM | DIASTOLIC BLOOD PRESSURE: 79 MMHG | OXYGEN SATURATION: 96 % | SYSTOLIC BLOOD PRESSURE: 137 MMHG | TEMPERATURE: 99.7 F | HEART RATE: 98 BPM

## 2018-03-22 VITALS — HEART RATE: 97 BPM

## 2018-03-22 VITALS
HEART RATE: 96 BPM | RESPIRATION RATE: 16 BRPM | OXYGEN SATURATION: 96 % | TEMPERATURE: 99.6 F | DIASTOLIC BLOOD PRESSURE: 67 MMHG | SYSTOLIC BLOOD PRESSURE: 117 MMHG

## 2018-03-22 VITALS
OXYGEN SATURATION: 95 % | RESPIRATION RATE: 16 BRPM | HEART RATE: 102 BPM | TEMPERATURE: 99.9 F | SYSTOLIC BLOOD PRESSURE: 151 MMHG | DIASTOLIC BLOOD PRESSURE: 85 MMHG

## 2018-03-22 VITALS — HEART RATE: 95 BPM

## 2018-03-22 VITALS
OXYGEN SATURATION: 95 % | HEART RATE: 95 BPM | DIASTOLIC BLOOD PRESSURE: 72 MMHG | SYSTOLIC BLOOD PRESSURE: 130 MMHG | TEMPERATURE: 99.7 F | RESPIRATION RATE: 16 BRPM

## 2018-03-22 VITALS — OXYGEN SATURATION: 95 %

## 2018-03-22 VITALS — HEART RATE: 100 BPM

## 2018-03-22 VITALS — HEART RATE: 92 BPM

## 2018-03-22 RX ADMIN — PROPOFOL PRN MLS/HR: 10 INJECTION, EMULSION INTRAVENOUS at 21:23

## 2018-03-22 RX ADMIN — PROPOFOL PRN MLS/HR: 10 INJECTION, EMULSION INTRAVENOUS at 16:39

## 2018-03-22 RX ADMIN — OXYCODONE HYDROCHLORIDE SCH MG: 100 SOLUTION ORAL at 11:28

## 2018-03-22 RX ADMIN — METOPROLOL TARTRATE SCH MG: 25 TABLET, FILM COATED ORAL at 09:24

## 2018-03-22 RX ADMIN — IPRATROPIUM BROMIDE AND ALBUTEROL SULFATE SCH AMPULE: .5; 3 SOLUTION RESPIRATORY (INHALATION) at 09:32

## 2018-03-22 RX ADMIN — PROPOFOL PRN MLS/HR: 10 INJECTION, EMULSION INTRAVENOUS at 00:13

## 2018-03-22 RX ADMIN — FUROSEMIDE SCH MG: 10 INJECTION, SOLUTION INTRAMUSCULAR; INTRAVENOUS at 09:23

## 2018-03-22 RX ADMIN — OXYCODONE HYDROCHLORIDE SCH MG: 100 SOLUTION ORAL at 06:37

## 2018-03-22 RX ADMIN — BACITRACIN SCH APPLIC: 500 OINTMENT TOPICAL at 09:25

## 2018-03-22 RX ADMIN — MAGNESIUM HYDROXIDE SCH ML: 400 SUSPENSION ORAL at 09:23

## 2018-03-22 RX ADMIN — CHLORHEXIDINE GLUCONATE 0.12% ORAL RINSE SCH ML: 1.2 LIQUID ORAL at 21:31

## 2018-03-22 RX ADMIN — MAGNESIUM HYDROXIDE SCH ML: 400 SUSPENSION ORAL at 21:30

## 2018-03-22 RX ADMIN — PROPOFOL PRN MLS/HR: 10 INJECTION, EMULSION INTRAVENOUS at 11:27

## 2018-03-22 RX ADMIN — CHLORHEXIDINE GLUCONATE 0.12% ORAL RINSE SCH ML: 1.2 LIQUID ORAL at 09:25

## 2018-03-22 RX ADMIN — DOCUSATE SODIUM SCH MG: 100 CAPSULE, LIQUID FILLED ORAL at 21:31

## 2018-03-22 RX ADMIN — OXYCODONE HYDROCHLORIDE SCH MG: 100 SOLUTION ORAL at 16:40

## 2018-03-22 RX ADMIN — OXYCODONE HYDROCHLORIDE SCH MG: 100 SOLUTION ORAL at 00:12

## 2018-03-22 RX ADMIN — BACITRACIN SCH APPLIC: 500 OINTMENT TOPICAL at 21:31

## 2018-03-22 RX ADMIN — IPRATROPIUM BROMIDE AND ALBUTEROL SULFATE SCH AMPULE: .5; 3 SOLUTION RESPIRATORY (INHALATION) at 03:52

## 2018-03-22 RX ADMIN — OXYCODONE HYDROCHLORIDE SCH MG: 100 SOLUTION ORAL at 18:35

## 2018-03-22 RX ADMIN — OXYCODONE HYDROCHLORIDE SCH MG: 100 SOLUTION ORAL at 03:31

## 2018-03-22 RX ADMIN — METOPROLOL TARTRATE SCH MG: 25 TABLET, FILM COATED ORAL at 21:30

## 2018-03-22 RX ADMIN — IPRATROPIUM BROMIDE AND ALBUTEROL SULFATE SCH AMPULE: .5; 3 SOLUTION RESPIRATORY (INHALATION) at 21:03

## 2018-03-22 RX ADMIN — CHLORHEXIDINE GLUCONATE SCH PACK: 500 CLOTH TOPICAL at 03:30

## 2018-03-22 RX ADMIN — PANTOPRAZOLE SODIUM SCH MG: 40 INJECTION, POWDER, FOR SOLUTION INTRAVENOUS at 21:30

## 2018-03-22 RX ADMIN — IPRATROPIUM BROMIDE AND ALBUTEROL SULFATE SCH AMPULE: .5; 3 SOLUTION RESPIRATORY (INHALATION) at 15:50

## 2018-03-22 RX ADMIN — DOCUSATE SODIUM SCH MG: 100 CAPSULE, LIQUID FILLED ORAL at 09:23
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
## 2018-03-23 VITALS
TEMPERATURE: 99.3 F | HEART RATE: 97 BPM | RESPIRATION RATE: 16 BRPM | DIASTOLIC BLOOD PRESSURE: 82 MMHG | SYSTOLIC BLOOD PRESSURE: 150 MMHG | OXYGEN SATURATION: 100 %

## 2018-03-23 VITALS — HEART RATE: 94 BPM

## 2018-03-23 VITALS
RESPIRATION RATE: 16 BRPM | DIASTOLIC BLOOD PRESSURE: 84 MMHG | OXYGEN SATURATION: 98 % | TEMPERATURE: 100 F | HEART RATE: 97 BPM | SYSTOLIC BLOOD PRESSURE: 153 MMHG

## 2018-03-23 VITALS — OXYGEN SATURATION: 100 %

## 2018-03-23 VITALS — OXYGEN SATURATION: 97 %

## 2018-03-23 VITALS — HEART RATE: 96 BPM

## 2018-03-23 VITALS — HEART RATE: 98 BPM

## 2018-03-23 RX ADMIN — METOPROLOL TARTRATE SCH MG: 25 TABLET, FILM COATED ORAL at 09:42

## 2018-03-23 RX ADMIN — FUROSEMIDE SCH MG: 10 INJECTION, SOLUTION INTRAMUSCULAR; INTRAVENOUS at 09:42

## 2018-03-23 RX ADMIN — Medication PRN MLS/HR: at 02:10

## 2018-03-23 RX ADMIN — OXYCODONE HYDROCHLORIDE SCH MG: 100 SOLUTION ORAL at 07:00

## 2018-03-23 RX ADMIN — CHLORHEXIDINE GLUCONATE SCH PACK: 500 CLOTH TOPICAL at 04:00

## 2018-03-23 RX ADMIN — OXYCODONE HYDROCHLORIDE SCH MG: 100 SOLUTION ORAL at 04:10

## 2018-03-23 RX ADMIN — MAGNESIUM HYDROXIDE SCH ML: 400 SUSPENSION ORAL at 09:00

## 2018-03-23 RX ADMIN — OXYCODONE HYDROCHLORIDE SCH MG: 100 SOLUTION ORAL at 00:02

## 2018-03-23 RX ADMIN — CHLORHEXIDINE GLUCONATE 0.12% ORAL RINSE SCH ML: 1.2 LIQUID ORAL at 08:00

## 2018-03-23 RX ADMIN — BACITRACIN SCH APPLIC: 500 OINTMENT TOPICAL at 09:00

## 2018-03-23 RX ADMIN — DOCUSATE SODIUM SCH MG: 100 CAPSULE, LIQUID FILLED ORAL at 09:00

## 2018-03-23 RX ADMIN — IPRATROPIUM BROMIDE AND ALBUTEROL SULFATE SCH AMPULE: .5; 3 SOLUTION RESPIRATORY (INHALATION) at 03:27

## 2018-03-23 RX ADMIN — IPRATROPIUM BROMIDE AND ALBUTEROL SULFATE SCH AMPULE: .5; 3 SOLUTION RESPIRATORY (INHALATION) at 08:08

## 2018-03-23 NOTE — HHI.HCPN
Reason for visit





   a.  To assist with evaluation and management of symptoms including: pain, 

dyspnea


   b.  To assist medical decision maker(s) with: better understanding of 

current medical conditions; weighing benefits/burdens of medical treatment 

options; making        


        medical treatment decisions.


.





Subjective/Interval History


The patient remains intubated on mechanical ventilation FiO2 50%, sedated on 

Fentanyl 125mcg and Propofol 20mcg/kg/min. 





Tmax 100. BP stable overnight. No new labs or imaging. Family (wife and 

biologic children) has decided to proceed with transition to comfort measures 

with withdrawal of life support.  has visited.


.


Family/friend interactions


Spoke with 3 daughters, son in law, granddaughter, brother and SUJIT, wife and 

her 2 daughters. Questions answered. Anticipatory guidance provided. All family 

remains concerned about patient comfort, the step daughterElodia (ARNGE) 

has requested to review medications. She asks for Morphine in addition to the 

Fentanyl drip. Family wants comfort and peace for this patient. Palliative care 

number provided. Reviewed orders with nurse. 


.





Advance Directives


Living Will:  Never completed


Health Care Surrogate:  Never completed


Durable Power of :  Never completed


Advance Directive Specifics


Health Care Surrogate(s):


MOOSE Oneill had a family meeting on 3/22/18, all but one family member 

felt the patient would not want ongoing aggressive care if it would only serve 

to prolong his suffering and recovery to a meaningful existence was unlikely. 

They patient's wife then opted out of healthcare decision-making, allowing his 

daughters to make the final decision. All were in agreement to proceed with 

transition to comfort with compassionate withdraw of artificial life support 3/

23/18. 


.


Significant change in goals:


Ching MOOSE Nelson had a family meeting on 3/22/18, all but one family member 

felt the patient would not want ongoing aggressive care if it would only serve 

to prolong his suffering and recovery to a meaningful existence was unlikely. 

They patient's wife then opted out of healthcare decision-making, allowing his 

daughters to make the final decision. All were in agreement to proceed with 

transition to comfort with compassionate withdraw of artificial life support 3/

23/18. 


.





Objective





Vital Signs








  Date Time  Temp Pulse Resp B/P (MAP) Pulse Ox O2 Delivery O2 Flow Rate FiO2


 


3/23/18 08:11     97   50


 


3/23/18 06:00  94      


 


3/23/18 04:00        60


 


3/23/18 04:00  97      


 


3/23/18 04:00 99.3 97 16 150/82 (104) 100   


 


3/23/18 03:25     100   60


 


3/23/18 02:00  98      


 


3/23/18 00:00        60


 


3/23/18 00:00 100.0 97 16 153/84 (107) 98   


 


3/23/18 00:00  97      


 


3/22/18 23:52     95   60


 


3/22/18 22:00  92      


 


3/22/18 21:03     96   60


 


3/22/18 20:00  94      


 


3/22/18 20:00 99.7 95 16 130/72 (91) 95   


 


3/22/18 20:00        40


 


3/22/18 19:00     95 Mechanical Ventilator 15.00 40


 


3/22/18 18:00  100      


 


3/22/18 16:00        40


 


3/22/18 16:00  102      


 


3/22/18 16:00 99.9 103 16 151/85 (107) 95   


 


3/22/18 15:46     95   40


 


3/22/18 14:00  100      


 


3/22/18 13:24     96   40


 


3/22/18 12:00  98      


 


3/22/18 12:00        40


 


3/22/18 12:00 99.7 98 16 137/79 (98) 96   


 


3/22/18 11:34     95   40














Intake & Output  


 


 3/23/18 3/23/18





 07:00 19:00


 


Intake Total 460.1 ml 


 


Output Total 700 ml 


 


Balance -239.9 ml 


 


  


 


IV Total 460.1 ml 


 


Output Urine Total 700 ml 


 


# Bowel Movements 0 








Physical Exam


CONSTITUTIONAL/GENERAL: This is an adequately nourished, male patient currently 

intubated on mechanical ventilation


TUBES/LINES/DRAINS: CVL, left femoral arterial line, PIV, Cuellar catheter, SCDs, 

ETT, NGT


EYES: Unable to examine eyes/pupils secondary to periorbital edema


ENT: Significant bruising and swelling on face.  Multiple lacerations with 

dried blood around the mouth and nose.


NECK: C-collar in place.


CARDIOVASCULAR: Regular rate and rhythm without murmurs, gallops, or rubs. No 

JVD. Peripheral pulses symmetric.


RESPIRATORY/CHEST: Orotracheally intubated on mechanical ventilation.  Coarse 

air exchange.


GASTROINTESTINAL: Abdomen soft, non-tender, nondistended.  Bowel sounds present.


MUSCULOSKELETAL: Left and right upper extremities with splints in place. Right 

lower extremity long splint in place, ex-fix.  Peripheral pulses are difficult 

to palpate


NEUROLOGICAL: Sedated on fentanyl and propofol. 


PSYCHIATRIC: Unable to assess secondary to clinical condition and sedation.


.





Diagnostic Tests


Laboratory





Laboratory Tests








Test


  3/21/18


04:16


 


White Blood Count


  14.1 TH/MM3


(4.0-11.0)


 


Red Blood Count


  3.34 MIL/MM3


(4.50-5.90)


 


Hemoglobin


  9.6 GM/DL


(13.0-17.0)


 


Hematocrit


  28.7 %


(39.0-51.0)


 


Mean Corpuscular Volume


  86.0 FL


(80.0-100.0)


 


Mean Corpuscular Hemoglobin


  28.6 PG


(27.0-34.0)


 


Mean Corpuscular Hemoglobin


Concent 33.3 %


(32.0-36.0)


 


Red Cell Distribution Width


  20.1 %


(11.6-17.2)


 


Platelet Count


  307 TH/MM3


(150-450)


 


Mean Platelet Volume


  8.5 FL


(7.0-11.0)


 


Neutrophils (%) (Auto)


  77.4 %


(16.0-70.0)


 


Lymphocytes (%) (Auto)


  10.0 %


(9.0-44.0)


 


Monocytes (%) (Auto)


  8.2 %


(0.0-8.0)


 


Eosinophils (%) (Auto)


  3.9 %


(0.0-4.0)


 


Basophils (%) (Auto)


  0.5 %


(0.0-2.0)


 


Neutrophils # (Auto)


  10.9 TH/MM3


(1.8-7.7)


 


Lymphocytes # (Auto)


  1.4 TH/MM3


(1.0-4.8)


 


Monocytes # (Auto)


  1.2 TH/MM3


(0-0.9)


 


Eosinophils # (Auto)


  0.6 TH/MM3


(0-0.4)


 


Basophils # (Auto)


  0.1 TH/MM3


(0-0.2)


 


CBC Comment DIFF FINAL 


 


Differential Comment  


 


Blood Gas Puncture Site ART LINE 


 


Blood Gas Patient Temperature 98.6 


 


Blood Gas HCO3


  25 mmol/L


(22-26)


 


Blood Gas Base Excess


  1.9 mmol/L


(-2-2)


 


Blood Gas Oxygen Saturation 95 % () 


 


Arterial Blood pH


  7.48


(7.380-7.420)


 


Arterial Blood Partial


Pressure CO2 35 mmHg


(38-42)


 


Arterial Blood Partial


Pressure O2 93 mmHg


()


 


Arterial Blood Oxygen Content


  12.6 Vol %


(12.0-20.0)


 


Arterial Blood


Carboxyhemoglobin 1.7 % (0-4) 


 


 


Arterial Blood Methemoglobin 1.0 % (0-2) 


 


Blood Gas Hemoglobin


  9.3 G/DL


(12.0-16.0)


 


Oxygen Delivery Device VENT 


 


Blood Gas Ventilator Setting SEE COMMENTS 


 


Blood Gas Inspired Oxygen 40 % 


 


Blood Urea Nitrogen


  27 MG/DL


(7-18)


 


Creatinine


  0.81 MG/DL


(0.60-1.30)


 


Random Glucose


  219 MG/DL


()


 


Total Protein


  6.4 GM/DL


(6.4-8.2)


 


Albumin


  2.1 GM/DL


(3.4-5.0)


 


Calcium Level


  7.7 MG/DL


(8.5-10.1)


 


Alkaline Phosphatase


  138 U/L


()


 


Aspartate Amino Transf


(AST/SGOT) 46 U/L (15-37) 


 


 


Alanine Aminotransferase


(ALT/SGPT) 38 U/L (12-78) 


 


 


Total Bilirubin


  0.6 MG/DL


(0.2-1.0)


 


Sodium Level


  146 MEQ/L


(136-145)


 


Potassium Level


  4.0 MEQ/L


(3.5-5.1)


 


Chloride Level


  113 MEQ/L


()


 


Carbon Dioxide Level


  25.1 MEQ/L


(21.0-32.0)


 


Anion Gap 8 MEQ/L (5-15) 


 


Estimat Glomerular Filtration


Rate 95 ML/MIN


(>89)








Result Diagram:  


3/21/18 0416                                                                   

             3/21/18 0416





Imaging


Last Impressions








Chest X-Ray 3/21/18 0600 Signed





Impressions: 





 Service Date/Time:  2018 04:18 - CONCLUSION:  Persistent 





 left lower lobe consolidation.     Fredy Marquez MD 


 


Maxillofacial CT 3/19/18 1700 Signed





Impressions: 





 Service Date/Time:  2018 21:02 - CONCLUSION:  1. Numerous 





 facial fractures as above similar in appearance to . Fluid in the 





 paranasal sinuses and mastoid air cells. Patient intubated.     Jeremie Cummings MD 


 


Multiplanar Reconstruction 3/19/18 0000 Signed





Impressions: 





 Service Date/Time:  2018 21:02 - CONCLUSION:  3-D 





 reconstruction of facial bone fractures. See facial bone CT report     Jeremie Cummings MD 


 


Brain MRI 3/16/18 0000 Signed





Impressions: 





 Service Date/Time:  2018 14:49 - CONCLUSION:  1. 3.7 cm 





 evolving right frontal lobe contusion. 2. Multifocal small signal 

abnormalities 





 in the white matter and posterior corpus callosum probably representing small 





 shear injuries. 3. Residual subarachnoid hemorrhage over both convexities, 

worse 





 on the right side posteriorly. 4. 1.2 cm left frontal subdural hygroma. 5. 5 

mm 





 right parietal occipital subacute subdural hematoma. 6. Fluid in the mastoid 

air 





 cells. 7. Multiple right-sided facial fractures with swelling. 8. 

Approximately 





 4 mm left to right midline shift at the left frontal lobe.     Jeremie Cummings MD 


 


Abdomen X-Ray 3/16/18 0000 Signed





Impressions: 





 Service Date/Time:  2018 09:42 - CONCLUSION:  Nasogastric 

tube 





 tip in good position in the distal stomach.     Kory Major MD 


 


Wrist X-Ray 3/13/18 0000 Signed





Impressions: 





 Service Date/Time:  2018 18:14 - CONCLUSION:  Interim screw 





 and plate fixation of distal radial fracture in near-anatomic alignment. No 





 acute complication demonstrated.     Jose Enrique Chiu MD 


 


Radius/Ulna X-Ray 3/13/18 0000 Signed





Impressions: 





 Service Date/Time:  2018 18:14 - CONCLUSION:  Expected 





 radiographic appearance post screw and plate fixation of shaft fractures of 

the 





 right radius and ulna.     Jose Enrique Chiu MD 


 


Head CT 3/9/18 0600 Signed





Impressions: 





 Service Date/Time:  2018 18:18 - CONCLUSION:  The extra-axial 





 fluid collection at the posterior highest convexity occipital region is less 





 prominent than on prior examination and has features on today's examination 





 suggesting subpleural blood.  Persistent subarachnoid hemorrhage in the right 





 hemisphere and new small area of subarachnoid hemorrhage posterior left high 





 parietal region.  Degree of swelling in the right hemisphere is similar to 





 prior.  There has been a significant increase the amount of right scalp 





 swelling, now extending only to the high convexities.     Fredy Marquez MD 


 


Neck CTA 3/9/18 0000 Signed





Impressions: 





 Service Date/Time:  2018 18:18 - CONCLUSION:  1. Bilateral 

mild 





 calcified plaque in the carotid bulbs with us to 50%% narrowing. 2. Symmetric 





 diameter to the vertebral arteries. 3. No evidence of dissection.     Fredy Marquez MD 


 


Knee X-Ray 3/9/18 0000 Signed





Impressions: 





 Service Date/Time:  2018 16:13 - CONCLUSION:  Limited images 

as 





 detailed above.     Fredy Sadler Jr., MD 


 


Pelvis X-Ray 3/8/18 1941 Signed





Impressions: 





 Service Date/Time:   19:39 - CONCLUSION:  No gross 





 abnormality seen on this limited exam.     Fredy Marquez MD 


 


Chest CT 3/8/18 1941 Signed





Impressions: 





 Service Date/Time:   20:17 - CONCLUSION:  1. Hairline 





 fracture the superior endplate of T7 with concentric paraspinal hematoma 





 extending 7 cm superior/inferior. 2. Multiple nondisplaced fractures of the 





 posterolateral 7th and 10th ribs. 3. Comminuted fracture of the body of the 

left 





 scapula. 4. Hiatus hernia containing the gastric fundus. 5. Probable contusion 





 in the posterior segment right upper lobe and posterolateral left lower chest.

   





   Fredy Marquez MD 


 


Cervical Spine CT 3/8/18 1941 Signed





Impressions: 





 Service Date/Time:   19:45 - CONCLUSION:  No evidence 

of 





 acute fracture or spondylolisthesis.     Fredy Marquez MD 


 


Abdomen/Pelvis CT 3/8/18 1941 Signed





Impressions: 





 Service Date/Time:   20:17 - CONCLUSION:  1. Acute left 





 rib fractures and left transverse process fractures. 2. Hiatus hernia 

containing 





 the gastric fundus. 3. The solid and hollow organs of the abdomen/pelvis 

appear 





 grossly intact.     Fredy Marquez MD 


 


Tibia/Fibula X-Ray 3/8/18 0000 Signed





Impressions: 





 Service Date/Time:   19:39 - CONCLUSION:  Proximal 





 metaphyseal tibial fracture with probable comminution.  Significantly 

displaced 





 comminuted fracture of the distal femur.     Fredy Marquez MD 


 


Humerus X-Ray 3/8/18 0000 Signed





Impressions: 





 Service Date/Time:   19:39 - CONCLUSION:  Comminuted 

and 





 angulated fracture of the distal one third humerus.     Fredy Marquez MD 


 


Femur X-Ray 3/8/18 0000 Signed





Impressions: 





 Service Date/Time:   19:39 - CONCLUSION:  Comminuted 

and 





  fracture of the distal femur.     Fredy Marquez MD 





.


Procedures


3/8/2018: Left subclavian Cordis IV central line placed


3/8/2018: Femoral arterial line placement


3/8/2018: Right frontal bur hole with placement of an intracranial pressure 

monitor





.





Assessment and Plan


Disease Oriented Problem List:  


(1) Tibial fracture


(2) Ribs, multiple fractures


(3) Humerus distal fracture


(4) Metabolic acidosis


(5) Intracranial hemorrhage


(6) Pleural effusion


(7) Hemorrhagic shock


(8) Acute respiratory failure


(9) Bilateral orbit fractures


Symptom Scale:  


(1) Pain


0-10 Scale:  Unable to quantify


Comment:  On Fentanyl drip and ATC Oxycodone. 


. 





(2) Dyspnea


0-10 Scale:  Unable to quantify





Pertinent Non-Medical Issues


Psychosocial: Patient is from [place] and moved to [place] last year. He has 4 

daughters plus grand-children and great-grandchildren. Patient had many 

different jobs. 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 They  in ; his ex-wife  on 

hospice the following month.  Patient was allegedly remarried to [facility] in 2018; his children and family are having a difficult time accepting this 

because they were unaware of the marriage.


Spiritual: Rastafarian casandra


Legal: Per [place] statutes, in the absence of written advanced directives 

healthcare proxy decision making falls to the patient's wife.


Ethical issues impacting care: No known ethical issues impacting care at this 

time.


.


Important Contacts


Mildred Infante, wife: [phone]


Elodia, wife's (Mildred Infante) daughter: [phone]


Laura Shahid, daughter: [phone]


.


Prognosis


Patient remains critically ill with multiple life-threatening  injuries status 

post detention on 3/8/2018. Patient is high risk for ongoing setbacks and 

complications.


.


Code Status:  No Code


Plan


* NO CODE





* DECISION-MAKING: The patient lacks capacity for decision-making and it is 

highly likely that he will not regain that capacity.  Mildred Infante, wife, is the 

healthcare proxy decision-maker. This morning the patient's wife opted out of 

medical decision making, therefore medical decision making falls to the patient 

4 daughters.





* MOOSE Oneill had a family meeting on 3/22/18, all but one family member 

felt the patient would not want ongoing aggressive care if it would only serve 

to prolong his suffering and recovery to a meaningful existence was unlikely. 

They patient's wife then opted out of healthcare decision-making, allowing his 

daughters to make the final decision. All were in agreement to proceed with 

transition to comfort with compassionate withdraw of artificial life support 3/

23/18. Met with family 3/23/18, questions answered and anticipatory guidance 

provided. 





* SYMPTOM management:


            = Pain: Multifactorial.  Multiple fractures and TBI status post 

detention.  Orders written for comfort measures. 


   = Dyspnea: Patient orotracheally intubated on mechanical ventilation; Follow-

up chest x-ray this morning showed persistent left lower lobe consolidation.  

On scheduled Duonebs. 


               Plan for compassionate withdrawal of life support 3/23/18. 

Orders written. 





* Exhibits B & C on chart. Signed by Dr. Gibson and Dr. Yi. Orders 

written for transition to comfort measures and compassionate withdrawal of life 

support. 





* Palliative care will continue to follow this patient throughout his 

hospitalization to establish chest, assist with symptom management and 

clarification of medical treatment goals.


.





Attestation


To help prompt me to consider important information that might be impacting 

today's encounter and assessment, information from prior notes written by 

myself or my colleagues may have been "brought forward" into today's note.  My 

signature on this note, however, is an attestation that I personally performed 

the exam, history, and/or decision-making noted today, and, unless otherwise 

indicated, the interactions with patient, family, and staff as well as the 

review of records all occurred today.  I also attest that the listed assessment 

and stated plan reflect my best clinical judgment today based on the 

combination of historical information, prior notes, and today's exam/ 

interactions.  When time spent is documented, it refers only to time spent 

today by the signer, or if indicated, combined time spent today by 

collaborating physician/nurse practitioner.











Dalila Galicia Mar 23, 2018 10:23
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
## 2018-03-23 NOTE — PD.ORT.PN
Subjective


Subjective Remarks


s/p MCA


right open distal femur s/p exfix


right tibial plateau s/p exfix


right BBFA s/p ORIF - POD 7


left humerus





Objective


Vitals





Vital Signs








  Date Time  Temp Pulse Resp B/P (MAP) Pulse Ox O2 Delivery O2 Flow Rate FiO2


 


3/23/18 06:00  94      


 


3/23/18 04:00        60


 


3/23/18 04:00  97      


 


3/23/18 04:00 99.3 97 16 150/82 (104) 100   


 


3/23/18 03:25     100   60


 


3/23/18 02:00  98      


 


3/23/18 00:00        60


 


3/23/18 00:00 100.0 97 16 153/84 (107) 98   


 


3/23/18 00:00  97      


 


3/22/18 23:52     95   60


 


3/22/18 22:00  92      


 


3/22/18 21:03     96   60


 


3/22/18 20:00  94      


 


3/22/18 20:00 99.7 95 16 130/72 (91) 95   


 


3/22/18 20:00        40


 


3/22/18 19:00     95 Mechanical Ventilator 15.00 40


 


3/22/18 18:00  100      


 


3/22/18 16:00        40


 


3/22/18 16:00  102      


 


3/22/18 16:00 99.9 103 16 151/85 (107) 95   


 


3/22/18 15:46     95   40


 


3/22/18 14:00  100      


 


3/22/18 13:24     96   40


 


3/22/18 12:00  98      


 


3/22/18 12:00        40


 


3/22/18 12:00 99.7 98 16 137/79 (98) 96   


 


3/22/18 11:34     95   40


 


3/22/18 10:00  95      


 


3/22/18 08:02     98   40


 


3/22/18 08:00        40


 


3/22/18 08:00     96 Mechanical Ventilator  40


 


3/22/18 08:00 99.7 96 16 118/70 (86) 96   


 


3/22/18 08:00  93      














I/O      


 


 3/22/18 3/22/18 3/22/18 3/23/18 3/23/18 3/23/18





 07:00 15:00 23:00 07:00 15:00 23:00


 


Intake Total   260 ml 460.1 ml  


 


Output Total 800 ml  1600 ml 700 ml  


 


Balance -800 ml  -1340 ml -239.9 ml  


 


      


 


IV Total   200 ml 460.1 ml  


 


Tube Irrigant   60 ml   


 


Output Urine Total 800 ml  1600 ml 700 ml  


 


Stool Total 0 ml  0 ml   


 


# Bowel Movements    0  








Result Diagram:  


3/21/18 0416                                                                   

             3/21/18 0416





Imaging





Last 24 hours Impressions








Chest X-Ray 3/9/18 0000 Signed





Impressions: 





 Service Date/Time:  Friday, March 9, 2018 02:33 - CONCLUSION:  Modest 

worsening 





 left base consolidation and with a probable tiny left pleural effusion 





 developing.     Jose Enrique Chiu MD 


 


Pelvis X-Ray 3/8/18 1941 Signed





Impressions: 





 Service Date/Time:  Thursday, March 8, 2018 19:39 - CONCLUSION:  No gross 





 abnormality seen on this limited exam.     Fredy Marquez MD 


 


Maxillofacial CT 3/8/18 1941 Signed





Impressions: 





 Service Date/Time:  Thursday, March 8, 2018 19:45 - CONCLUSION:  1. Bilateral 





 LeFort III facial fractures with significant impaction.. 2. Intracranial 





 displacement of the right superior orbital fracture and comminuted fracture of 





 the anterior paolo carlyn.     Fredy Marquez MD 


 


Head CT 3/8/18 1941 Signed





Impressions: 





 Service Date/Time:  Thursday, March 8, 2018 19:45 - CONCLUSION:  1. 

Intracranial 





 hemorrhage predominately on the right including subarachnoid (high convexity), 





 intraparenchymal (right occipital), subdural (right temporal) and epidural (

high 





 convexity right parietal). 2.    LeFort III facial bone fractures with crush 





 injury of the maxillary and ethmoid regions.     Fredy Marquez MD 


 


Chest X-Ray 3/8/18 1941 Signed





Impressions: 





 Service Date/Time:  Thursday, March 8, 2018 19:39 - CONCLUSION:  1. ET tube in 





 good position. 2. Bilateral rib fractures, nondisplaced and multilevel on the 





 right and with a displaced posterior 3rd rib fragment.     Fredy Marquez MD 


 


Chest CT 3/8/18 1941 Signed





Impressions: 





 Service Date/Time:  Thursday, March 8, 2018 20:17 - CONCLUSION:  1. Hairline 





 fracture the superior endplate of T7 with concentric paraspinal hematoma 





 extending 7 cm superior/inferior. 2. Multiple nondisplaced fractures of the 





 posterolateral 7th and 10th ribs. 3. Comminuted fracture of the body of the 

left 





 scapula. 4. Hiatus hernia containing the gastric fundus. 5. Probable contusion 





 in the posterior segment right upper lobe and posterolateral left lower chest.

   





   Fredy Marquez MD 


 


Cervical Spine CT 3/8/18 1941 Signed





Impressions: 





 Service Date/Time:  Thursday, March 8, 2018 19:45 - CONCLUSION:  No evidence 

of 





 acute fracture or spondylolisthesis.     Fredy Marquez MD 


 


Abdomen/Pelvis CT 3/8/18 1941 Signed





Impressions: 





 Service Date/Time:  Thursday, March 8, 2018 20:17 - CONCLUSION:  1. Acute left 





 rib fractures and left transverse process fractures. 2. Hiatus hernia 

containing 





 the gastric fundus. 3. The solid and hollow organs of the abdomen/pelvis 

appear 





 grossly intact.     Fredy Marquez MD 








Objective Remarks


Pt laying in bed


Mech ventilation


VSS





RLE: External fixator in place with clean dry dressings. Intact distal pulses 

and good capillary refills


RUE: Splint, dressings clean and dry. Mild swelling into hand, +nvi


LUE: dressings clean and dry. Moderate swelling hand, +nvi





Assessment & Plan


Assessment and Plan


1) Right Open Distal Femur Fx and Right Tibial Plateau Fx status post external 

fixation, irrigation debridement and traumatic wound closure POD #11


3) Right Radius/Ulna Shaft Fxs s/p ORIF - POD 7


4) Left Distal 1/3 Humerus Fx





patient family had palliative care meeting yesterday and decided to withdraw 

care


process is scheduled to start today


no further orthopedic surgery will be pursued at this point


ortho will be signing off


if conditions change and family decides to proceed with care, please re-consult 

ortho











Galindo Segura/[employer] PA Mar 23, 2018 07:05
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
## 2021-08-24 NOTE — PD.CONS
HPI


Service


Critical Care Medicine


Consult Requested By





Primary Care Physician


Unknown


History of Present Illness


67-year-old gentleman fell from a chair and hit the back of his head.  He 

presents with the open laceration on the back of his head with bleeding.  CT of 

the head revealed small traumatic SAH, and few small intraparenchymal 

hemorrhages.  He is neurologically intact,scalp wound sutured by ED. in the 

emergency department he had one episode of hypotension-responded well to 1L 

bolus.  His only complaints is moderate headache.





Review of Systems


Constitutional:  DENIES: Diaphoretic episodes, Fatigue, Fever, Weight gain, 

Weight loss, Chills, Dizziness, Change in appetite, Night Sweats


Endocrine:  DENIES: Heat/cold intolerance, Polydipsia, Polyuria, Polyphagia


Eyes:  DENIES: Blurred vision, Diplopia, Eye inflammation, Eye pain, Vision loss

, Photosensitivity, Double Vision


Ears, nose, mouth, throat:  DENIES: Tinnitus, Hearing loss, Vertigo, Nasal 

discharge, Oral lesions, Throat pain, Hoarseness, Ear Pain, Running Nose, 

Epistaxis, Sinus Pain, Toothache, Odynophagia


Respiratory:  DENIES: Apneas, Cough, Snoring, Wheezing, Hemoptysis, Sputum 

production, Shortness of breath


Cardiovascular:  DENIES: Chest pain, Palpitations, Syncope, Dyspnea on Exertion

, PND, Lower Extremity Edema, Orthopnea, Claudication


Gastrointestinal:  DENIES: Abdominal pain, Black stools, Bloody stools, 

Constipation, Diarrhea, Nausea, Vomiting, Difficulty Swallowing, Anorexia


Genitourinary:  DENIES: Sexual dysfunction, Urinary frequency, Urinary 

incontinence, Urgency, Hematuria, Dysuria, Nocturia, Penile Discharge, 

Testicular Pain, Testicular Swelling


Musculoskeletal:  DENIES: Joint pain, Muscle aches, Stiffness, Joint Swelling, 

Back pain, Neck pain


Integumentary:  DENIES: Abnormal pigmentation, Nail changes, Pruritus, Rash


Hematologic/lymphatic:  DENIES: Bruising, Lymphadenopathy


Immunologic/allergic:  DENIES: Eczema, Urticaria


Neurologic:  COMPLAINS OF: Headache, DENIES: Abnormal gait, Localized weakness, 

Paresthesias, Seizures, Speech Problems, Tremor, Poor Balance


Psychiatric:  DENIES: Anxiety, Confusion, Mood changes, Depression, 

Hallucinations, Agitation, Suicidal Ideation, Homicidal Ideation, Delusions





Past Family Social History


Allergies:  


Coded Allergies:  


     No Known Allergies (Unverified , 9/29/17)


Past Medical History


Hypertension


Dyslipidemia


GERD


Past Surgical History


None


Reported Medications





Reported Meds & Active Scripts


Active


Reported


Zocor (Simvastatin) 5 Mg Tab 5 Mg PO DAILY


Pantoprazole (Pantoprazole Sodium) 20 Mg Tab 20 Mg PO DAILY


Lisinopril 20 Mg Tab 5 Mg PO DAILY


Active Ordered Medications





Current Medications








 Medications


  (Trade)  Dose


 Ordered  Sig/Kirby


 Route


 PRN Reason  Start Time


 Stop Time Status Last Admin


Dose Admin


 


 Sodium Chloride


  (NS Flush)  2 ml  UNSCH  PRN


 IV FLUSH


 FLUSH AFTER USING IV ACCESS  9/29/17 23:00


     


 


 


 Sodium Chloride


  (NS Flush)  2 ml  BID


 IV FLUSH


   9/30/17 09:00


     


 


 


 Acetaminophen


  (Tylenol)  650 mg  Q6H  PRN


 PO


 PAIN 1-10 AND/OR FEVER >101F  9/29/17 23:00


     


 


 


 Ondansetron HCl


  (Zofran Inj)  4 mg  Q6H  PRN


 IV PUSH


 NAUSEA OR VOMITING  9/29/17 23:00


     


 


 


 Miscellaneous


 Information  1  Q361D


 XX


   9/29/17 23:00


     


 


 


 Chlorhexidine


 Gluconate


  (Chlorhexidine


 2% Cloth)  3 pack


 Taper  DAILY@04


 TOP


   9/30/17 04:00


 9/26/18 03:59   


 


 


 Chlorhexidine


 Gluconate


  (Chlorhexidine


 2% Cloth)  3 pack  UNSCH  PRN


 TOP


 HYGIENIC CARE  9/29/17 23:00


     


 


 


 Senna/Docusate


 Sodium


  (Kathleen-Colace)  1 tab  BID


 PO


   9/30/17 09:00


     


 


 


 Magnesium


 Hydroxide


  (Milk Of


 Magnesia Liq)  30 ml  Q12H  PRN


 PO


 MILD - MODERATE CONSTIPATION  9/29/17 23:00


     


 


 


 Sennosides


  (Senokot)  17.2 mg  Q12H  PRN


 PO


 MODERATE - SEVERE CONSTIPATION  9/29/17 23:00


     


 


 


 Bisacodyl


  (Dulcolax Supp)  10 mg  DAILY  PRN


 RECTAL


 SEVERE CONSITIPATION  9/29/17 23:00


     


 


 


 Lactulose


  (Lactulose Liq)  30 ml  DAILY  PRN


 PO


 SEVERE CONSITIPATION  9/29/17 23:00


     


 


 


 Acetaminophen  100 ml @ 


 400 mls/hr  Q6H  PRN


 IV


 HEADACHE  9/29/17 23:00


    9/30/17 01:15


 


 


 Famotidine


  (Pepcid)  20 mg  BID


 PO


   9/30/17 09:00


     


 


 


 Lactated Ringer's  1,000 ml @ 


 1,000 mls/hr  Q1H


 IV


   9/30/17 00:45


 9/30/17 01:44  9/30/17 01:04


 


 


 Levetriacetam 500


 mg/Sodium Chloride  105 ml @ 


 420 mls/hr  Q12HR


 IV


   9/30/17 00:45


    9/30/17 01:24


 








Family History


No family history significant of early coronary artery disease


Social History


Denies alcohol or illicit drug abuse


No history of smoking





Physical Exam


Vital Signs





Vital Signs








  Date Time  Temp Pulse Resp B/P (MAP) Pulse Ox O2 Delivery O2 Flow Rate FiO2


 


9/30/17 00:31  79 18 124/71 (88) 99 Room Air  


 


9/30/17 00:19  78 18 95/64 (74) 100 Room Air  


 


9/29/17 23:29  87 18 98/69 (79) 97 Nasal Cannula 2.00 


 


9/29/17 23:23     99 Nasal Cannula 2.00 


 


9/29/17 22:27  71 18 135/91 (106) 97 Room Air  


 


9/29/17 21:52     97 Room Air  


 


9/29/17 21:52  74 18  97   


 


9/29/17 21:48 98.1 72 18 144/96 (112) 98   








Physical Exam


GENERAL: Well-nourished, well-developed patient.


SKIN: Warm and dry.


HEAD: Laceration on the back of the head, sutured, nonbleeding


EYES: No scleral icterus. No injection or drainage. 


NECK: Supple, trachea midline. No JVD or lymphadenopathy.


CARDIOVASCULAR: Regular rate and rhythm without murmurs, gallops, or rubs. 


RESPIRATORY: Breath sounds equal bilaterally. No accessory muscle use.


GASTROINTESTINAL: Abdomen soft, non-tender, nondistended. 


MUSCULOSKELETAL: No cyanosis, or edema. 


BACK: Nontender without obvious deformity. 


NEURO EXAM:


GCS: M 6 V 5 E 4


Mental Status: The patient is alert and oriented to person, place, and time 

with normal speech. 


Cranial Nerves: Visual acuity intact bilaterally. Visual fields normal in all 

quadrants. Pupils are round, reactive to light. Extraocular movements are 

intact without ptosis. Hearing is normal bilaterally. Voice is normal. Tongue 

protrudes midline and moves symmetrically. 


Reflexes: Biceps, patellar, and Achilles are 2/4 bilaterally. No clonus. 


Sensation: Sensation is intact bilaterally to pain and light touch. Two-point 

discrimination is intact. 


Motor: Good muscle tone. Strength is 5/5 bilaterally.


Laboratory





Laboratory Tests








Test


  9/29/17


22:20 9/30/17


00:34


 


White Blood Count 11.3  


 


Red Blood Count 4.87  


 


Hemoglobin 14.4  


 


Hematocrit 43.1  


 


Mean Corpuscular Volume 88.5  


 


Mean Corpuscular Hemoglobin 29.6  


 


Mean Corpuscular Hemoglobin


Concent 33.5 


  


 


 


Red Cell Distribution Width 14.6  


 


Platelet Count 203  


 


Mean Platelet Volume 8.2  


 


Neutrophils (%) (Auto) 70.8  


 


Lymphocytes (%) (Auto) 21.3  


 


Monocytes (%) (Auto) 5.1  


 


Eosinophils (%) (Auto) 2.6  


 


Basophils (%) (Auto) 0.2  


 


Neutrophils # (Auto) 8.0  


 


Lymphocytes # (Auto) 2.4  


 


Monocytes # (Auto) 0.6  


 


Eosinophils # (Auto) 0.3  


 


Basophils # (Auto) 0.0  


 


CBC Comment DIFF FINAL  


 


Differential Comment   


 


Prothrombin Time 10.2  


 


Prothromb Time International


Ratio 0.9 


  


 


 


Activated Partial


Thromboplast Time 24.7 


  


 


 


Blood Urea Nitrogen 14  


 


Creatinine 1.23  


 


Random Glucose 138  


 


Calcium Level 8.9  


 


Magnesium Level 2.1  


 


Sodium Level 138  


 


Potassium Level 3.5  


 


Chloride Level 105  


 


Carbon Dioxide Level 27.1  


 


Anion Gap 6  


 


Estimat Glomerular Filtration


Rate 59 


  


 


 


Ethyl Alcohol Level LESS THAN 3  








Result Diagram:  


9/29/17 2220 9/29/17 2220








Assessment and Plan


Assessment and Plan


Concussion


- Status post fall


- Neuro checks per unit protocol





Traumatic subarachnoid and intraparenchymal hemorrhage


- No surgical intervention indicated


- Admit to ICU


- Neuro checks every hour


- Supportive care


- Keppra seizure prophylaxis


- Repeat CT at a.m.





Left-sided transverse process fractures at L2 and L3


- Per trauma surgeon





Hypertension


- Hold lisinopril due to hypotension in the ED





Dyslipidemia


- Resume Zocor





GERD


- Pantoprazole





DVT GI prophylaxis


- Teds SCDs


- Early aggressive mobilization


- No pharmacological DVT prophylaxis due to ICH


- Pantoprazole





Critical Care:


The total critical care time was 35 minutes. Time to perform other separately 

billable procedures was not included in the critical care time.











Darrius Kulkarni MD Sep 30, 2017 01:48
__________________________________________________





HPI


Service


Neurosurgery (Coverage for Dr. Tabor)


Consult Requested By


Dr. Ananya Lopez


Reason for Consult


TBI


Primary Care Physician


Unknown


History of Present Illness


History of Present Illness


67 y.o male fell from a chair and hit the back of his head,open back of his 

head with bleeding-already seen and worked up by the ED,has SAH,gcs 15,neuro 

intact,scalp wound sutured by ED-episode of hypotension-responded well to 1L 

bolus,stat CT chest,AP ordered-at time of my exam patient c/o headache





Review of Systems


Review of Systems


Constitutional:  COMPLAINS OF: Diaphoretic episodes, Fatigue, Fever, Weight gain

, Weight loss, Chills, Dizziness, Change in appetite, Night Sweats


Endocrine:  DENIES: Heat/cold intolerance, Polydipsia, Polyuria, Polyphagia


Eyes:  DENIES: Blurred vision, Diplopia, Eye inflammation, Eye pain, Vision loss

, Photosensitivity, Double Vision


Ears, nose, mouth, throat:  DENIES: Tinnitus, Hearing loss, Vertigo, Nasal 

discharge, Oral lesions, Throat pain, Hoarseness, Ear Pain, Running Nose, 

Epistaxis, Sinus Pain, Toothache, Odynophagia


Respiratory:  DENIES: Apneas, Cough, Snoring, Wheezing, Hemoptysis, Sputum 

production, Shortness of breath


Cardiovascular:  DENIES: Chest pain, Palpitations, Syncope, Dyspnea on Exertion

, PND, Lower Extremity Edema, Orthopnea, Claudication


Gastrointestinal:  DENIES: Abdominal pain, Black stools, Bloody stools, 

Constipation, Diarrhea, Nausea, Vomiting, Difficulty Swallowing, Anorexia


Genitourinary:  DENIES: Sexual dysfunction, Urinary frequency, Urinary 

incontinence, Urgency, Hematuria, Dysuria, Nocturia, Penile Discharge, 

Testicular Pain, Testicular Swelling


Musculoskeletal:  DENIES: Joint pain, Muscle aches, Stiffness, Joint Swelling, 

Back pain, Neck pain


Integumentary:  DENIES: Abnormal pigmentation, Nail changes, Pruritus, Rash


Hematologic/lymphatic:  DENIES: Bruising, Lymphadenopathy


Neurologic:  DENIES: Abnormal gait, Headache, Localized weakness, Paresthesias, 

Seizures, Speech Problems, Tremor, Poor Balance


Psychiatric:  DENIES: Anxiety, Confusion, Mood changes, Depression, 

Hallucinations, Agitation, Suicidal Ideation, Homicidal Ideation, Delusions





Past Family Social History


Allergies:  


Coded Allergies:  


     No Known Allergies (Unverified , 9/29/17)


Past Medical History





 PFSH 


Past Family Social History


Allergies:  


Coded Allergies:  


     No Known Allergies (Unverified , 9/29/17)


Past Medical History


none


Past Surgical History


multiple fx sx


Reported Medications


none


Active Ordered Medications


none


Family History


none


Social History


no etoh





Physical Exam


Vital Signs





Vital Signs








  Date Time  Temp Pulse Resp B/P (MAP) Pulse Ox O2 Delivery O2 Flow Rate FiO2


 


9/30/17 09:26     98 Nasal Cannula 2.50 


 


9/30/17 07:00     98 Nasal Cannula 2.00 


 


9/30/17 06:00  79      


 


9/30/17 04:00  76      


 


9/30/17 04:00 98.3 76 13 81/52 (62) 97   


 


9/30/17 02:00  90      


 


9/30/17 01:00 98.1 88 19 125/73 (90) 94   


 


9/30/17 00:31  79 18 124/71 (88) 99 Room Air  


 


9/30/17 00:19  78 18 95/64 (74) 100 Room Air  


 


9/29/17 23:29  87 18 98/69 (79) 97 Nasal Cannula 2.00 


 


9/29/17 23:23     99 Nasal Cannula 2.00 


 


9/29/17 22:27  71 18 135/91 (106) 97 Room Air  


 


9/29/17 21:52     97 Room Air  


 


9/29/17 21:52  74 18  97   


 


9/29/17 21:48 98.1 72 18 144/96 (112) 98   








Physical Exam


AAOx3


PERRLA


EOMI 


FS


MAESx4


FCCx4


Laboratory





Laboratory Tests








Test


  9/29/17


22:20 9/30/17


00:34 9/30/17


01:00 9/30/17


02:15


 


White Blood Count 11.3  18.6   


 


Red Blood Count 4.87  4.20   


 


Hemoglobin 14.4  12.4   


 


Hematocrit 43.1  37.6   


 


Mean Corpuscular Volume 88.5  89.6   


 


Mean Corpuscular Hemoglobin 29.6  29.5   


 


Mean Corpuscular Hemoglobin


Concent 33.5 


  33.0 


  


  


 


 


Red Cell Distribution Width 14.6  14.8   


 


Platelet Count 203  185   


 


Mean Platelet Volume 8.2  8.6   


 


Neutrophils (%) (Auto) 70.8  83.6   


 


Lymphocytes (%) (Auto) 21.3  9.6   


 


Monocytes (%) (Auto) 5.1  6.3   


 


Eosinophils (%) (Auto) 2.6  0.3   


 


Basophils (%) (Auto) 0.2  0.2   


 


Neutrophils # (Auto) 8.0  15.5   


 


Lymphocytes # (Auto) 2.4  1.8   


 


Monocytes # (Auto) 0.6  1.2   


 


Eosinophils # (Auto) 0.3  0.0   


 


Basophils # (Auto) 0.0  0.0   


 


CBC Comment DIFF FINAL  DIFF FINAL   


 


Differential Comment      


 


Prothrombin Time 10.2    


 


Prothromb Time International


Ratio 0.9 


  


  


  


 


 


Activated Partial


Thromboplast Time 24.7 


  


  


  


 


 


Blood Urea Nitrogen 14    


 


Creatinine 1.23    


 


Random Glucose 138    


 


Calcium Level 8.9    


 


Magnesium Level 2.1    


 


Sodium Level 138    


 


Potassium Level 3.5    


 


Chloride Level 105    


 


Carbon Dioxide Level 27.1    


 


Anion Gap 6    


 


Estimat Glomerular Filtration


Rate 59 


  


  


  


 


 


Ethyl Alcohol Level LESS THAN 3    


 


Nasal Screen MRSA (PCR)


  


  


  MRSA NOT


DETECTED 


 


 


Urine Color    YELLOW 


 


Urine Turbidity    CLEAR 


 


Urine pH    5.5 


 


Urine Specific Gravity


  


  


  


  GREATER THAN


1.050


 


Urine Protein    TRACE 


 


Urine Glucose (UA)    NEG 


 


Urine Ketones    10 


 


Urine Occult Blood    NEG 


 


Urine Nitrite    NEG 


 


Urine Bilirubin    NEG 


 


Urine Urobilinogen    LESS THAN 2.0 


 


Urine Leukocyte Esterase    NEG 


 


Urine RBC    1 


 


Urine WBC    2 


 


Urine Squamous Epithelial


Cells 


  


  


  <1 


 


 


Urine Hyaline Casts    3 


 


Urine Mucus    FEW 


 


Microscopic Urinalysis Comment


  


  


  


  CULT NOT


INDICATED


 


Test


  9/30/17


03:44 


  


  


 


 


White Blood Count 13.7    


 


Red Blood Count 3.87    


 


Hemoglobin 11.3    


 


Hematocrit 34.2    


 


Mean Corpuscular Volume 88.3    


 


Mean Corpuscular Hemoglobin 29.1    


 


Mean Corpuscular Hemoglobin


Concent 32.9 


  


  


  


 


 


Red Cell Distribution Width 14.1    


 


Platelet Count 169    


 


Mean Platelet Volume 8.1    


 


Neutrophils (%) (Auto) 85.3    


 


Lymphocytes (%) (Auto) 9.0    


 


Monocytes (%) (Auto) 5.3    


 


Eosinophils (%) (Auto) 0.1    


 


Basophils (%) (Auto) 0.3    


 


Neutrophils # (Auto) 11.7    


 


Lymphocytes # (Auto) 1.2    


 


Monocytes # (Auto) 0.7    


 


Eosinophils # (Auto) 0.0    


 


Basophils # (Auto) 0.0    


 


CBC Comment DIFF FINAL    


 


Differential Comment     


 


Prothrombin Time 11.2    


 


Prothromb Time International


Ratio 1.0 


  


  


  


 


 


Blood Urea Nitrogen 13    


 


Creatinine 0.92    


 


Random Glucose 136    


 


Calcium Level 8.0    


 


Sodium Level 140    


 


Potassium Level 4.2    


 


Chloride Level 109    


 


Carbon Dioxide Level 24.5    


 


Anion Gap 7    


 


Estimat Glomerular Filtration


Rate 82 


  


  


  


 








Result Diagram:  


9/30/17 0344                                                                   

             9/30/17 0344





Course





Last Impressions








Head CT 9/30/17 0800 Signed





Impressions: 





 Service Date/Time:  Saturday, September 30, 2017 09:32 - CONCLUSION:  The 

areas 





 of subarachnoid hemorrhage are no longer visualized however there is 

persistent 





 visualization of the interparenchymal hemorrhage seen within the right frontal 





 lobe and right cerebellum. There is loss of definition of the sulci along the 





 right frontal lobe and right parietal lobe as compared to the left side 





 concerning for increasing edema. Recommend short interval followup consider 





 further evaluation with MRI..     Chrissy Villasenor MD 


 


Thoracic Spine CT 9/30/17 0000 Signed





Impressions: 





 Service Date/Time:  Saturday, September 30, 2017 00:37 - CONCLUSION:  1. 





 Negative for acute traumatic injury in the thoracic spine.     Jeremie Cummings MD 


 


Lumbar Spine CT 9/30/17 0000 Signed





Impressions: 





 Service Date/Time:  Saturday, September 30, 2017 00:37 - CONCLUSION:  1. 





 Left-sided transverse process fractures at L2 and L3.     Jeremie Cummings MD 


 


Chest CT 9/30/17 0000 Signed





Impressions: 





 Service Date/Time:  Saturday, September 30, 2017 00:37 - CONCLUSION:  1. 





 Negative for acute traumatic injury in the thorax. Dependent atelectasis in 

the 





 lungs. Remote granulomatous disease with calcified mediastinal lymph nodes. 2. 





 Moderate-sized hiatal hernia. Remote healed bilateral rib fractures.     

Jeremie Cummings MD 


 


Abdomen/Pelvis CT 9/30/17 0000 Signed





Impressions: 





 Service Date/Time:  Saturday, September 30, 2017 00:37 - CONCLUSION:  1. 





 Left-sided transverse process fractures at L2 and L3. 2. Negative for acute 





 traumatic injury within the abdomen and pelvis.     Jeremie Cummings MD 


 


Cervical Spine CT 9/29/17 0000 Signed





Impressions: 





 Service Date/Time:  Friday, September 29, 2017 21:55 - CONCLUSION:  Intact 





 cervical spine. Degenerative changes at C5/C6 and C6/C7.     Jose Enrique Chiu MD 








Current Medications








 Medications


  (Trade)  Dose


 Ordered  Sig/Kirby


 Route


 PRN Reason  Start Time


 Stop Time Status Last Admin


Dose Admin


 


 Sodium Chloride


  (NS Flush)  2 ml  UNSCH  PRN


 IV FLUSH


 FLUSH AFTER USING IV ACCESS  9/29/17 23:00


     


 


 


 Sodium Chloride


  (NS Flush)  2 ml  BID


 IV FLUSH


   9/30/17 09:00


    9/30/17 08:20


 


 


 Acetaminophen


  (Tylenol)  650 mg  Q6H  PRN


 PO


 PAIN 1-10 AND/OR FEVER >101F  9/29/17 23:00


    9/30/17 12:47


 


 


 Ondansetron HCl


  (Zofran Inj)  4 mg  Q6H  PRN


 IV PUSH


 NAUSEA OR VOMITING  9/29/17 23:00


     


 


 


 Miscellaneous


 Information  1  Q361D


 XX


   9/29/17 23:00


     


 


 


 Chlorhexidine


 Gluconate


  (Chlorhexidine


 2% Cloth)  3 pack


 Taper  DAILY@04


 TOP


   9/30/17 04:00


 9/26/18 03:59   


 


 


 Chlorhexidine


 Gluconate


  (Chlorhexidine


 2% Cloth)  3 pack  UNSCH  PRN


 [facility] [place]


 HYGIENIC CARE  9/29/17 23:00


     


 


 


 Senna/Docusate


 Sodium


  (Kathleen-Colace)  1 tab  BID


 PO


   9/30/17 09:00


    9/30/17 08:14


 


 


 Sennosides


  (Senokot)  17.2 mg  Q12H  PRN


 PO


 MODERATE - SEVERE CONSTIPATION  9/29/17 23:00


     


 


 


 Bisacodyl


  (Dulcolax Supp)  10 mg  DAILY  PRN


 RECTAL


 SEVERE CONSITIPATION  9/29/17 23:00


     


 


 


 Acetaminophen  100 ml @ 


 400 mls/hr  Q6H  PRN


 IV


 HEADACHE  9/29/17 23:00


    9/30/17 01:15


 


 


 Famotidine


  (Pepcid)  20 mg  BID


 PO


   9/30/17 09:00


    9/30/17 08:14


 


 


 Levetriacetam 500


 mg/Sodium Chloride  105 ml @ 


 420 mls/hr  Q12HR


 IV


   9/30/17 00:45


    9/30/17 08:16


 


 


 Lactulose


  (Lactulose Liq)  30 ml  DAILY


 PO


   9/30/17 09:00


     


 


 


 Magnesium


 Hydroxide


  (Milk Of


 Magnesia Liq)  30 ml  Q12H


 PO


   9/30/17 09:00


     


 











Assessment and Plan


Diagnosis:  


(1) Subarachnoid hemorrhage following injury


ICD Codes:  S06.6X9A - Traumatic subarachnoid hemorrhage with loss of 

consciousness of unspecified duration, initial encounter


Status:  Acute


(2) Head trauma


ICD Codes:  S09.90XA - Unspecified injury of head, initial encounter


Status:  Acute


Assessment and Plan


Patient has Severe TBI with JUANITA 


Recommend Concussion Protocol = no bright lights, TV's, cell phones for 2 weeks 

/ brain rest.


Recommend Keppra 500mg IV or PO for 7 days only (if patient has no Sz)


MRI of the Brain without contrast to evaluate JUANITA 


PT/OT


ok with diet


step down in am 10/2 - neurologic checks q1 until then.





Problem Qualifiers





(1) Subarachnoid hemorrhage following injury:  


Qualified Codes:  S06.6X1A - Traumatic subarachnoid hemorrhage with loss of 

consciousness of 30 minutes or less, initial encounter


(2) Head trauma:  


Qualified Codes:  S09.90XA - Unspecified injury of head, initial encounter








Brett Overton MD Sep 30, 2017 13:34
Sore throat

## 2022-05-29 NOTE — EKG
Date Performed: 03/10/2018       Time Performed: 10:19:40

 

PTAGE:      67 years

 

EKG:      Sinus rhythm 

 

 WITH SINUS ARRHYTHMIA NONSPECIFIC T-WAVE ABNORMALITY BORDERLINE ECG 

 

NO PREVIOUS TRACING            

 

DOCTOR:   Raul Rivera  Interpretating Date/Time  03/10/2018 23:43:18
normal (ped)...
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
